# Patient Record
Sex: MALE | Race: BLACK OR AFRICAN AMERICAN | NOT HISPANIC OR LATINO | Employment: FULL TIME | URBAN - METROPOLITAN AREA
[De-identification: names, ages, dates, MRNs, and addresses within clinical notes are randomized per-mention and may not be internally consistent; named-entity substitution may affect disease eponyms.]

---

## 2017-01-05 ENCOUNTER — TELEPHONE (OUTPATIENT)
Dept: INTERNAL MEDICINE | Facility: CLINIC | Age: 57
End: 2017-01-05

## 2017-01-05 NOTE — TELEPHONE ENCOUNTER
----- Message from Kellie Pacheco sent at 1/4/2017  2:15 PM CST -----  Call pt at [phone]//concerning a report of MRI need you to call me concerning it//samina titus

## 2017-01-26 ENCOUNTER — OFFICE VISIT (OUTPATIENT)
Dept: SLEEP MEDICINE | Facility: CLINIC | Age: 57
End: 2017-01-26
Payer: COMMERCIAL

## 2017-01-26 VITALS
DIASTOLIC BLOOD PRESSURE: 58 MMHG | HEART RATE: 72 BPM | BODY MASS INDEX: 32.97 KG/M2 | OXYGEN SATURATION: 99 % | HEIGHT: 72 IN | WEIGHT: 243.38 LBS | SYSTOLIC BLOOD PRESSURE: 124 MMHG | RESPIRATION RATE: 18 BRPM

## 2017-01-26 DIAGNOSIS — G47.33 OSA ON CPAP: Primary | ICD-10-CM

## 2017-01-26 DIAGNOSIS — R06.3 CENTRAL SLEEP APNEA DUE TO CHEYNE-STOKES RESPIRATION: ICD-10-CM

## 2017-01-26 DIAGNOSIS — G47.26 SHIFT WORK SLEEP DISORDER: Chronic | ICD-10-CM

## 2017-01-26 DIAGNOSIS — Z72.821 POOR SLEEP HYGIENE: Chronic | ICD-10-CM

## 2017-01-26 PROCEDURE — 3078F DIAST BP <80 MM HG: CPT | Mod: S$GLB,,, | Performed by: INTERNAL MEDICINE

## 2017-01-26 PROCEDURE — 1159F MED LIST DOCD IN RCRD: CPT | Mod: S$GLB,,, | Performed by: INTERNAL MEDICINE

## 2017-01-26 PROCEDURE — 3074F SYST BP LT 130 MM HG: CPT | Mod: S$GLB,,, | Performed by: INTERNAL MEDICINE

## 2017-01-26 PROCEDURE — 99213 OFFICE O/P EST LOW 20 MIN: CPT | Mod: S$GLB,,, | Performed by: INTERNAL MEDICINE

## 2017-01-26 PROCEDURE — 99999 PR PBB SHADOW E&M-EST. PATIENT-LVL IV: CPT | Mod: PBBFAC,,, | Performed by: INTERNAL MEDICINE

## 2017-01-26 NOTE — PATIENT INSTRUCTIONS
Sleep Hygiene: The healthy habits of Good sleep    [email or web address].[employer]    1. Don't go to bed unless you are sleepy: If you are not sleepy at bedtime , then do something else. Read a book, listen to soft music or browse through a magazine. Find something relaxing , but not stimulating to take your mind off worries about sleep, this will relax your body and distract your mind.    2. If you are not asleep after 20 minutes, then get out of bed.  Find something else to do that will make you feel relaxed. If you can, do this in another room. Your bedroom should be where you go to sleep. It is not a place to go when you are bored. Once you feel sleepy again, go back to bed.    3. Begin rituals than help you relax each night before bed.  This can include such things like a warm bath, light snack or a few minutes of reading.    4. Get up at the same time every morning.  Do this even on weekends and holidays.    5. Get a full night's sleep on a regular basis.  Get enough sleep so that you feel well rested nearly every day.    6. Avoid taking naps if you can.  If you must take a nap, try to keep it short (less than one hour). Never take a nap after 3 pm.    7. Keep a regular schedule.  Regular times for meals, medications, chores and other activities help keep the inner body clock running smoothly.    8. Don't read, write, eat, watch TV, talk on the phone, play with electronics or play cards in bed.    9. Do not have any caffeine after lunch.    10. Do not have a beer, a glass of wine, or any other alcohol within six hours of your bedtime.    11. Do not have a cigarette or any other source of nicotine before bedtime.    12. Do not go to bed hungry, but don't eat a big meal near bedtime either.    13. Avoid any tough exercise within six hours of your bedtime.  You should exercise on a regular basis, but do it earlier in the day,(talk to your doctor before you begin an exercise program)    14.  Avoid sleeping pills, or use  them cautiously.  Most doctors do not prescribe sleeping pills for periods of more than hree weeks. Do not drink alcohol while taking sleeping pills.    15. Try to get rid of or deal with things that make you worry.  If you are unable to do this, then find a time during the day to get all your worries out of your system. Your bed is a place to rest, not a place to worry.    16. Make you bedroom quiet, dark and a little bit cool.  An easy way to remember this; it should remind you of a cave, while this may not sound romantic, it seems to work for bats. Bats are champion sleepers. They get about 16 hours of sleep each day. Maybe it's because they sleep in dark , cool caves.       Thank you for using [facility] and your visit to our facility.  To rate you experience with Dr Raymundo Hines please click on link below    [email or web address]    Rate your Physician

## 2017-01-26 NOTE — PROGRESS NOTES
Subjective:       Christiano Cox is a 57 y.o. male    Severe SOFIYA based on PSG  This a follow-up appointment  I last saw him October 2016.  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  Has not been using device  On the night shift he tries to get to get some sleep during the day but most often he takes short naps  We discussed with patient intense her more was sleep hygiene and chronic sleep exacerbation.  I like him to keep a sleep diary and wear an acti watch    Works [employer] sec[employer] in [employer]  [place] score 1  His titration study showed an AHI of 47.9.  His echo ejection fraction is 45% with some mild diastolic dysfunction  We dropped his CPAP pressure to 6 cm of water pressure because on a BiPAP settings he had lots of central apneas  His sleep routine on a regular night is in bed at 10:30 PM wakeup time 5 AM.          Previous Report(s) Reviewed: historical medical records and office notes     The following portions of the patient's history were reviewed and updated as appropriate:   He  has a past medical history of Acute coronary syndrome; Coronary atherosclerosis of unspecified type of vessel, native or graft; DM (diabetes mellitus), type 2 with complications; History of gout; Hyperlipidemia associated with type 2 diabetes mellitus; Hypertension associated with diabetes; Obesity, unspecified; SOFIYA on CPAP; S/P CABG x 3; and Type II diabetes mellitus with renal manifestations.  He  does not have any pertinent problems on file.  He  has a past surgical history that includes Coronary artery bypass graft; Cardiac catheterization; and Coronary angioplasty.  His family history includes Diabetes in his brother, mother, and sister; Heart disease in his brother; Hypertension in his brother and father; Stroke in his sister.  He  reports that he has never smoked. He has never used smokeless tobacco. He reports that he drinks alcohol. He reports that he does not use illicit drugs.  He has a  "current medication list which includes the following prescription(s): accu-chek multiclix lancet, amlodipine, aspirin, atorvastatin, bd insulin pen needle uf short, dulaglutide, fenofibrate micronized, furosemide, insulin glargine, lancing device, metoprolol tartrate, nitroglycerin, ranolazine, ticagrelor, and valsartan-hydrochlorothiazide.  Current Outpatient Prescriptions on File Prior to Visit   Medication Sig Dispense Refill    ACCU-CHEK MULTICLIX LANCET lancets TEST DAILY AS DIRECTED 102 each 11    amlodipine (NORVASC) 10 MG tablet TAKE ONE TABLET BY MOUTH EVERY DAY 90 tablet 3    aspirin 81 MG Chew Take 81 mg by mouth once daily.      atorvastatin (LIPITOR) 80 MG tablet Take 1 tablet (80 mg total) by mouth once daily. 60 tablet 1    BD INSULIN PEN NEEDLE UF SHORT 31 gauge x 5/16" Ndle AS DIRECTED 100 each 3    dulaglutide 1.5 mg/0.5 mL PnIj Inject 1.5 mg into the skin every 7 days. 0.5 mL 11    fenofibrate micronized (LOFIBRA) 67 MG capsule Take 1 capsule (67 mg total) by mouth daily with breakfast. 30 capsule 10    furosemide (LASIX) 20 MG tablet Take 1 tablet (20 mg total) by mouth once daily. 30 tablet 11    insulin glargine (LANTUS SOLOSTAR) 100 unit/mL (3 mL) InPn pen Inject 40 Units into the skin every evening. 3 Box 3    lancing device (MULTI-LANCET DEVICE) Misc 1 lancet by In Vitro route once daily. 102 each 3    metoprolol tartrate (LOPRESSOR) 100 MG tablet Take 1 tablet (100 mg total) by mouth 2 (two) times daily. 180 tablet 3    nitroGLYCERIN (NITROSTAT) 0.4 MG SL tablet Place 1 tablet (0.4 mg total) under the tongue every 5 (five) minutes as needed for Chest pain. 30 tablet 6    ranolazine (RANEXA) 500 MG Tb12 Take 1 tablet (500 mg total) by mouth 2 (two) times daily. 180 tablet 3    ticagrelor (BRILINTA) 90 mg tablet Take 1 tablet (90 mg total) by mouth 2 (two) times daily. 180 tablet 3    valsartan-hydrochlorothiazide (DIOVAN-HCT) 320-25 mg per tablet Take 1 tablet by mouth once " daily. 30 tablet 11     No current facility-administered medications on file prior to visit.      He is allergic to no known drug allergies..    Review of Systems  A comprehensive review of systems was negative.      Objective:        Visit Vitals    BP (!) 124/58    Pulse 72    Resp 18    Ht 6' (1.829 m)    Wt 110.4 kg (243 lb 6.2 oz)    SpO2 99%    BMI 33.01 kg/m2     General appearance: alert, appears stated age, cooperative and no distress  Lungs: clear to auscultation bilaterally and normal percussion bilaterally  Extremities: extremities normal, atraumatic, no cyanosis or edema  Pulses: 2+ and symmetric  Skin: Skin color, texture, turgor normal. No rashes or lesions        Echo:  1 - Mild left atrial enlargement.     2 - Mildly depressed left ventricular systolic function (EF 45-50%).     3 - Concentric hypertrophy.     4 - Left ventricular diastolic dysfunction.     5 - Normal right ventricular systolic function .     6 - Trivial to mild mitral regurgitation    Download:  12/27/2016 - 1/25/2017  YOB: 1960  Mask:  Compliance Summary  12/27/2016 - 1/25/2017 (30 days)  Days with Device Usage 5 days  Days without Device Usage 25 days  Percent Days with Device Usage 16.7%  Cumulative Usage 17 hrs. 6 mins. 44 secs.  Maximum Usage (1 Day) 6 hrs. 31 mins. 19 secs.  Average Usage (All Days) 34 mins. 13 secs.  Average Usage (Days Used) 3 hrs. 25 mins. 20 secs.  Minimum Usage (1 Day) 15 secs.  Percent of Days with Usage >= 4 Hours 10.0%  Percent of Days with Usage < 4 Hours 90.0%  Date Range  Total Blower Time 17 hrs. 10 mins. 44 secs.  Sleep Therapy Statistics ([employer])  Average Time in Large Leak Per Day 3 mins. 36 secs.  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     TIME SPENT WITH PATIENT:     Time spent: 20 minutes in face to face  discussion concerning diagnosis, prognosis, review of lab and test results, benefits of treatment as well as management of disease, counseling of patient and coordination of care between various health  care providers . Greater than half the time spent was used for coordination of care and counseling of patient.     Raymundo Hines    Pulmonary/Critical care/Sleepmedicine      This note was prepared using voice recognition system and is likely to have sound alike errors that may have been overlooked even after proof reading.  Please call me with any questions    Discussed diagnosis, its evaluation, treatment and usual course. All questions answered.    Raymundo Hines MD

## 2017-01-26 NOTE — MR AVS SNAPSHOT
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      [facility]     [facility] Care [facility] -  Assistance  Registered nurses in the [facility] Call Center provide clinical advisement, health education, appointment booking, and other advisory services.  Call for this free service at [phone].             Medications           Message regarding Medications     Verify the changes and/or additions to your medication regime listed below are the same as discussed with your clinician today.  If any of these changes or additions are incorrect, please notify your healthcare provider.             Verify that the below list of medications is an accurate representation of the medications you are currently taking.  If none reported, the list may be blank. If incorrect, please contact your healthcare provider. Carry this list with you in case of emergency.           Current Medications     ACCU-CHEK MULTICLIX LANCET lancets TEST DAILY AS DIRECTED    amlodipine (NORVASC) 10 MG tablet TAKE ONE TABLET BY MOUTH EVERY DAY    aspirin 81 MG Chew Take 81 mg by mouth once daily.    atorvastatin  "(LIPITOR) 80 MG tablet Take 1 tablet (80 mg total) by mouth once daily.    BD INSULIN PEN NEEDLE UF SHORT 31 gauge x 5/16" Ndle AS DIRECTED    dulaglutide 1.5 mg/0.5 mL PnIj Inject 1.5 mg into the skin every 7 days.    fenofibrate micronized (LOFIBRA) 67 MG capsule Take 1 capsule (67 mg total) by mouth daily with breakfast.    furosemide (LASIX) 20 MG tablet Take 1 tablet (20 mg total) by mouth once daily.    insulin glargine (LANTUS SOLOSTAR) 100 unit/mL (3 mL) InPn pen Inject 40 Units into the skin every evening.    lancing device (MULTI-LANCET DEVICE) Misc 1 lancet by In Vitro route once daily.    metoprolol tartrate (LOPRESSOR) 100 MG tablet Take 1 tablet (100 mg total) by mouth 2 (two) times daily.    nitroGLYCERIN (NITROSTAT) 0.4 MG SL tablet Place 1 tablet (0.4 mg total) under the tongue every 5 (five) minutes as needed for Chest pain.    ranolazine (RANEXA) 500 MG Tb12 Take 1 tablet (500 mg total) by mouth 2 (two) times daily.    ticagrelor (BRILINTA) 90 mg tablet Take 1 tablet (90 mg total) by mouth 2 (two) times daily.    valsartan-hydrochlorothiazide (DIOVAN-HCT) 320-25 mg per tablet Take 1 tablet by mouth once daily.           Clinical Reference Information           Vital Signs - Last Recorded  Most recent update: 1/26/2017 10:13 AM by Woody Quinones LPN    BP Pulse Resp Ht Wt SpO2    (!) 124/58 72 18 6' (1.829 m) 110.4 kg (243 lb 6.2 oz) 99%    BMI                33.01 kg/m2          Blood Pressure          Most Recent Value    BP  (!)  124/58      Allergies as of 1/26/2017     No Known Drug Allergies      Immunizations Administered on Date of Encounter - 1/26/2017     None      Orders Placed During Today's Visit      Normal Orders This Visit    Actigraphy       Instructions    Sleep Hygiene: The healthy habits of Good sleep    [email or web address]    1. Don't go to bed unless you are sleepy: If you are not sleepy at bedtime , then do something else. Read a book, listen to soft music or browse " through a magazine. Find something relaxing , but not stimulating to take your mind off worries about sleep, this will relax your body and distract your mind.    2. If you are not asleep after 20 minutes, then get out of bed.  Find something else to do that will make you feel relaxed. If you can, do this in another room. Your bedroom should be where you go to sleep. It is not a place to go when you are bored. Once you feel sleepy again, go back to bed.    3. Begin rituals than help you relax each night before bed.  This can include such things like a warm bath, light snack or a few minutes of reading.    4. Get up at the same time every morning.  Do this even on weekends and holidays.    5. Get a full night's sleep on a regular basis.  Get enough sleep so that you feel well rested nearly every day.    6. Avoid taking naps if you can.  If you must take a nap, try to keep it short (less than one hour). Never take a nap after 3 pm.    7. Keep a regular schedule.  Regular times for meals, medications, chores and other activities help keep the inner body clock running smoothly.    8. Don't read, write, eat, watch TV, talk on the phone, play with electronics or play cards in bed.    9. Do not have any caffeine after lunch.    10. Do not have a beer, a glass of wine, or any other alcohol within six hours of your bedtime.    11. Do not have a cigarette or any other source of nicotine before bedtime.    12. Do not go to bed hungry, but don't eat a big meal near bedtime either.    13. Avoid any tough exercise within six hours of your bedtime.  You should exercise on a regular basis, but do it earlier in the day,(talk to your doctor before you begin an exercise program)    14.  Avoid sleeping pills, or use them cautiously.  Most doctors do not prescribe sleeping pills for periods of more than hree weeks. Do not drink alcohol while taking sleeping pills.    15. Try to get rid of or deal with things that make you worry.  If you  are unable to do this, then find a time during the day to get all your worries out of your system. Your bed is a place to rest, not a place to worry.    16. Make you bedroom quiet, dark and a little bit cool.  An easy way to remember this; it should remind you of a cave, while this may not sound romantic, it seems to work for bats. Bats are champion sleepers. They get about 16 hours of sleep each day. Maybe it's because they sleep in dark , cool caves.       Thank you for using [facility] and your visit to our facility.  To rate you experience with Dr Raymundo Hines please click on link below    [email or web address]    Rate your Physician

## 2017-01-30 RX ORDER — PEN NEEDLE, DIABETIC 30 GX3/16"
NEEDLE, DISPOSABLE MISCELLANEOUS
Qty: 100 EACH | Refills: 3 | Status: SHIPPED | OUTPATIENT
Start: 2017-01-30 | End: 2018-01-22 | Stop reason: SDUPTHER

## 2017-02-08 RX ORDER — METOPROLOL TARTRATE 100 MG/1
TABLET ORAL
Qty: 180 TABLET | OUTPATIENT
Start: 2017-02-08

## 2017-02-13 ENCOUNTER — TELEPHONE (OUTPATIENT)
Dept: INTERNAL MEDICINE | Facility: CLINIC | Age: 57
End: 2017-02-13

## 2017-02-13 NOTE — TELEPHONE ENCOUNTER
----- Message from Jessy Zavala sent at 2/13/2017  8:26 AM CST -----  Contact: pt   Call pt regarding some paperwork that need to be filled out.    ...[phone]

## 2017-02-14 ENCOUNTER — APPOINTMENT (OUTPATIENT)
Dept: CARDIOLOGY | Facility: CLINIC | Age: 57
End: 2017-02-14
Payer: COMMERCIAL

## 2017-02-14 ENCOUNTER — HOSPITAL ENCOUNTER (OUTPATIENT)
Dept: RADIOLOGY | Facility: HOSPITAL | Age: 57
Discharge: HOME | End: 2017-02-14
Attending: INTERNAL MEDICINE
Payer: COMMERCIAL

## 2017-02-14 ENCOUNTER — OFFICE VISIT (OUTPATIENT)
Dept: INTERNAL MEDICINE | Facility: CLINIC | Age: 57
End: 2017-02-14
Payer: COMMERCIAL

## 2017-02-14 ENCOUNTER — APPOINTMENT (OUTPATIENT)
Dept: LAB | Facility: HOSPITAL | Age: 57
End: 2017-02-14
Attending: INTERNAL MEDICINE
Payer: COMMERCIAL

## 2017-02-14 ENCOUNTER — DOCUMENTATION ONLY (OUTPATIENT)
Dept: CARDIOLOGY | Facility: HOSPITAL | Age: 57
End: 2017-02-14

## 2017-02-14 VITALS
DIASTOLIC BLOOD PRESSURE: 76 MMHG | SYSTOLIC BLOOD PRESSURE: 122 MMHG | BODY MASS INDEX: 33.41 KG/M2 | HEART RATE: 74 BPM | TEMPERATURE: 96 F | HEIGHT: 72 IN | OXYGEN SATURATION: 98 % | WEIGHT: 246.69 LBS

## 2017-02-14 DIAGNOSIS — Z01.818 PREOP EXAMINATION: Primary | ICD-10-CM

## 2017-02-14 DIAGNOSIS — Z79.4 TYPE 2 DIABETES MELLITUS WITH DIABETIC NEPHROPATHY, WITH LONG-TERM CURRENT USE OF INSULIN: ICD-10-CM

## 2017-02-14 DIAGNOSIS — Z01.818 PREOP EXAMINATION: ICD-10-CM

## 2017-02-14 DIAGNOSIS — Z12.5 SCREENING FOR PROSTATE CANCER: ICD-10-CM

## 2017-02-14 DIAGNOSIS — E11.21 TYPE 2 DIABETES MELLITUS WITH DIABETIC NEPHROPATHY, WITH LONG-TERM CURRENT USE OF INSULIN: ICD-10-CM

## 2017-02-14 LAB
BILIRUB UR QL STRIP: NEGATIVE
CLARITY UR REFRACT.AUTO: CLEAR
COLOR UR AUTO: NORMAL
GLUCOSE UR QL STRIP: NEGATIVE
HGB UR QL STRIP: NEGATIVE
KETONES UR QL STRIP: NEGATIVE
LEUKOCYTE ESTERASE UR QL STRIP: NEGATIVE
NITRITE UR QL STRIP: NEGATIVE
PH UR STRIP: 6 [PH] (ref 5–8)
PROT UR QL STRIP: NEGATIVE
SP GR UR STRIP: 1.01 (ref 1–1.03)
URN SPEC COLLECT METH UR: NORMAL
UROBILINOGEN UR STRIP-ACNC: NEGATIVE EU/DL

## 2017-02-14 PROCEDURE — 93010 ELECTROCARDIOGRAM REPORT: CPT | Mod: S$GLB,,, | Performed by: INTERNAL MEDICINE

## 2017-02-14 PROCEDURE — 71020 XR CHEST PA AND LATERAL: CPT | Mod: 26,,, | Performed by: RADIOLOGY

## 2017-02-14 PROCEDURE — 93005 ELECTROCARDIOGRAM TRACING: CPT | Mod: S$GLB,,, | Performed by: INTERNAL MEDICINE

## 2017-02-14 PROCEDURE — 99214 OFFICE O/P EST MOD 30 MIN: CPT | Mod: S$GLB,,, | Performed by: INTERNAL MEDICINE

## 2017-02-14 PROCEDURE — 71020 XR CHEST PA AND LATERAL: CPT | Mod: TC,PO

## 2017-02-14 PROCEDURE — 2022F DILAT RTA XM EVC RTNOPTHY: CPT | Mod: S$GLB,,, | Performed by: INTERNAL MEDICINE

## 2017-02-14 PROCEDURE — 3066F NEPHROPATHY DOC TX: CPT | Mod: S$GLB,,, | Performed by: INTERNAL MEDICINE

## 2017-02-14 PROCEDURE — 3078F DIAST BP <80 MM HG: CPT | Mod: S$GLB,,, | Performed by: INTERNAL MEDICINE

## 2017-02-14 PROCEDURE — 3044F HG A1C LEVEL LT 7.0%: CPT | Mod: S$GLB,,, | Performed by: INTERNAL MEDICINE

## 2017-02-14 PROCEDURE — 3074F SYST BP LT 130 MM HG: CPT | Mod: S$GLB,,, | Performed by: INTERNAL MEDICINE

## 2017-02-14 PROCEDURE — 99999 PR PBB SHADOW E&M-EST. PATIENT-LVL III: CPT | Mod: PBBFAC,,, | Performed by: INTERNAL MEDICINE

## 2017-02-14 PROCEDURE — 81003 URINALYSIS AUTO W/O SCOPE: CPT

## 2017-02-14 NOTE — PROGRESS NOTES
Spoke with patient's daughter regarding patient seeking clearance for back surgery. Patient is post NSTEMI in May 2016. Underwent angiogram with successful PCI to SVG to OM1 with GRAHAM. He has been advised to remain on dual antiplatelet therapy (ASA and Brilinta) for 1 year post PCI. I am unable to clear patient at this time for surgery.

## 2017-02-14 NOTE — PROGRESS NOTES
HPI:  Patient is a 57-year-old man who comes in today for preoperative clearance to have L4-L5 laminectomy done.  He has typical sciatic pain.  Patient otherwise at this time is doing fine.  He denies any chest pains or shortness of breath.  He is status post CABG.  He also had one stent put in last May.  He is on Brilinta and aspirin.  He states his surgeon told him he had to stop the aspirin but could stay on Brilinta.  Patient's diabetes is been doing well.  He's had no hypoglycemic events.  His blood pressures been well-controlled.  He denies any other complaints or problems      Current MEDS: medcard review, verified and update  Allergies: Per the electronic medical record    Past Medical History   Diagnosis Date    Acute coronary syndrome     Coronary atherosclerosis of unspecified type of vessel, native or graft      s/p cabg and stents    DM (diabetes mellitus), type 2 with complications     History of gout     Hyperlipidemia associated with type 2 diabetes mellitus     Hypertension associated with diabetes     Obesity, unspecified     SOFIYA on CPAP     S/P CABG x 3     Type II diabetes mellitus with renal manifestations        Past Surgical History   Procedure Laterality Date    Coronary artery bypass graft      Cardiac catheterization      Coronary angioplasty         SHx: per the electronic medical record    FHx: recorded in the electronic medical record    ROS:    denies any chest pains or shortness of breath. Denies any nausea, vomiting or diarrhea. Denies any fever, chills or sweats. Denies any change in weight, voice, stool, skin or hair. Denies any dysuria, dyspepsia or dysphagia. Denies any change in vision, hearing or headaches. Denies any swollen lymph nodes or loss of memory.    PE:  Visit Vitals    /76    Pulse 74    Temp 96.1 °F (35.6 °C) (Tympanic)    Ht 6' (1.829 m)    Wt 111.9 kg (246 lb 11.1 oz)    SpO2 98%    BMI 33.46 kg/m2     Gen: Well-developed, well-nourished,  male, in no acute distress, oriented x3  HEENT: neck is supple, no adenopathy, carotids 2+ equal without bruits, thyroid exam normal size without nodules.  CHEST: clear to auscultation and percussion  CVS: regular rate and rhythm without significant murmur, gallop, or rubs  ABD: soft, benign, no rebound no guarding, no distention.  Bowel sounds are normal.     nontender.  No palpable masses.  No organomegaly and no audible bruits.  RECTAL: Deferred.  EXT: no clubbing, cyanosis, or edema  LYMPH: no cervical, inguinal, or axillary adenopathy  FEET: no loss of sensation.  No ulcers or pressure sores.  Monofilament testing normal  NEURO: gait normal.  Cranial nerves II- XII intact. No nystagmus.  Speech normal.   Gross motor and sensory unremarkable.  Chest x-ray was normal  EKG shows previous documented changes, but it is unchanged from prior EKGs  H&H 12.8 and 36  CMP with creatinine 2.0, unchanged, otherwise unremarkable      Impression:  Lumbar back pain with sciatica  Coronary artery disease, status post CABG, status post stent May 2016.  He currently is on dual antiplatelet therapy.  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  He is at higher risk due to his other comorbid conditions.  I'll leave it up to decision the surgeon whether he should stay on antiplatelet therapy with Brilinta.  His last stent was 10 months ago, so I know the cardiologist would want it continued.  He should take half of his Lantus the evening before.  All other medications may be continued except the aspirin should be stopped

## 2017-02-16 ENCOUNTER — TELEPHONE (OUTPATIENT)
Dept: PULMONOLOGY | Facility: CLINIC | Age: 57
End: 2017-02-16

## 2017-03-01 ENCOUNTER — OFFICE VISIT (OUTPATIENT)
Dept: CARDIOLOGY | Facility: CLINIC | Age: 57
End: 2017-03-01
Payer: COMMERCIAL

## 2017-03-01 VITALS
BODY MASS INDEX: 33.59 KG/M2 | HEART RATE: 64 BPM | HEIGHT: 72 IN | WEIGHT: 248 LBS | DIASTOLIC BLOOD PRESSURE: 64 MMHG | SYSTOLIC BLOOD PRESSURE: 123 MMHG

## 2017-03-01 DIAGNOSIS — I25.10 CORONARY ARTERY DISEASE INVOLVING NATIVE CORONARY ARTERY OF NATIVE HEART WITHOUT ANGINA PECTORIS: Primary | ICD-10-CM

## 2017-03-01 DIAGNOSIS — E78.5 HYPERLIPIDEMIA ASSOCIATED WITH TYPE 2 DIABETES MELLITUS: ICD-10-CM

## 2017-03-01 DIAGNOSIS — E11.59 HYPERTENSION ASSOCIATED WITH DIABETES: ICD-10-CM

## 2017-03-01 DIAGNOSIS — I25.10 ATHEROSCLEROSIS OF NATIVE CORONARY ARTERY OF NATIVE HEART WITHOUT ANGINA PECTORIS: ICD-10-CM

## 2017-03-01 DIAGNOSIS — G47.33 OSA ON CPAP: ICD-10-CM

## 2017-03-01 DIAGNOSIS — R06.3 CENTRAL SLEEP APNEA DUE TO CHEYNE-STOKES RESPIRATION: ICD-10-CM

## 2017-03-01 DIAGNOSIS — E11.69 HYPERLIPIDEMIA ASSOCIATED WITH TYPE 2 DIABETES MELLITUS: ICD-10-CM

## 2017-03-01 DIAGNOSIS — I15.2 HYPERTENSION ASSOCIATED WITH DIABETES: ICD-10-CM

## 2017-03-01 PROCEDURE — 3078F DIAST BP <80 MM HG: CPT | Mod: S$GLB,,, | Performed by: NURSE PRACTITIONER

## 2017-03-01 PROCEDURE — 99214 OFFICE O/P EST MOD 30 MIN: CPT | Mod: S$GLB,,, | Performed by: NURSE PRACTITIONER

## 2017-03-01 PROCEDURE — 3066F NEPHROPATHY DOC TX: CPT | Mod: S$GLB,,, | Performed by: NURSE PRACTITIONER

## 2017-03-01 PROCEDURE — 3044F HG A1C LEVEL LT 7.0%: CPT | Mod: S$GLB,,, | Performed by: NURSE PRACTITIONER

## 2017-03-01 PROCEDURE — 99999 PR PBB SHADOW E&M-EST. PATIENT-LVL II: CPT | Mod: PBBFAC,,, | Performed by: NURSE PRACTITIONER

## 2017-03-01 PROCEDURE — 2022F DILAT RTA XM EVC RTNOPTHY: CPT | Mod: S$GLB,,, | Performed by: NURSE PRACTITIONER

## 2017-03-01 PROCEDURE — 3074F SYST BP LT 130 MM HG: CPT | Mod: S$GLB,,, | Performed by: NURSE PRACTITIONER

## 2017-03-01 RX ORDER — ISOSORBIDE MONONITRATE 30 MG/1
30 TABLET, EXTENDED RELEASE ORAL DAILY
Qty: 30 TABLET | Refills: 6 | Status: SHIPPED | OUTPATIENT
Start: 2017-03-01 | End: 2017-11-20 | Stop reason: SDUPTHER

## 2017-03-01 RX ORDER — GABAPENTIN 300 MG/1
CAPSULE ORAL
Refills: 3 | COMMUNITY
Start: 2017-02-14 | End: 2017-11-27

## 2017-03-01 NOTE — MR AVS SNAPSHOT
[facility]  [id number] [place] [id number]  Phone: [phone]  Fax: [phone]                  Christiano Cox   3/1/2017 8:30 AM   Office Visit    Description:  Male : 1960   Provider:  АНДРЕЙ Jose   Department:  [facility]           Reason for Visit     Coronary Artery Disease           Diagnoses this Visit        Comments    Coronary artery disease involving native coronary artery of native heart without angina pectoris    -  Primary     SOFIYA on CPAP         Central sleep apnea due to Cheyne-Pérez respiration         Hypertension associated with diabetes         Atherosclerosis of native coronary artery of native heart without angina pectoris         Hyperlipidemia associated with type 2 diabetes mellitus                To Do List           Goals (5 Years of Data)     None      Follow-Up and Disposition     Return in about 6 months (around 2017) for CAD with fasting labs with PCP .       These Medications        Disp Refills Start End    isosorbide mononitrate (IMDUR) 30 MG 24 hr tablet 30 tablet 6 3/1/2017     Take 1 tablet (30 mg total) by mouth once daily. - Oral    Pharmacy: [facility] [address] #: [phone]         [facility] On Call     [facility] On Call Nurse Care Line -  Assistance  Registered nurses in the [facility] provide clinical advisement, health education, appointment booking, and other advisory services.  Call for this free service at [phone].             Medications           Message regarding Medications     Verify the changes and/or additions to your medication regime listed below are the same as discussed with your clinician today.  If any of these changes or additions are incorrect, please notify your healthcare provider.        START taking these NEW medications        Refills    isosorbide mononitrate (IMDUR) 30 MG 24 hr tablet 6    Sig: Take  "1 tablet (30 mg total) by mouth once daily.    Class: Normal    Route: Oral           Verify that the below list of medications is an accurate representation of the medications you are currently taking.  If none reported, the list may be blank. If incorrect, please contact your healthcare provider. Carry this list with you in case of emergency.           Current Medications     ACCU-CHEK MULTICLIX LANCET lancets TEST DAILY AS DIRECTED    amlodipine (NORVASC) 10 MG tablet TAKE ONE TABLET BY MOUTH EVERY DAY    aspirin 81 MG Chew Take 81 mg by mouth once daily.    atorvastatin (LIPITOR) 80 MG tablet Take 1 tablet (80 mg total) by mouth once daily.    dulaglutide 1.5 mg/0.5 mL PnIj Inject 1.5 mg into the skin every 7 days.    fenofibrate micronized (LOFIBRA) 67 MG capsule Take 1 capsule (67 mg total) by mouth daily with breakfast.    furosemide (LASIX) 20 MG tablet Take 1 tablet (20 mg total) by mouth once daily.    gabapentin (NEURONTIN) 300 MG capsule ONE BY MOUTH EVERY TWELVE HOURS    insulin glargine (LANTUS SOLOSTAR) 100 unit/mL (3 mL) InPn pen Inject 40 Units into the skin every evening.    lancing device (MULTI-LANCET DEVICE) Misc 1 lancet by In Vitro route once daily.    metoprolol tartrate (LOPRESSOR) 100 MG tablet Take 1 tablet (100 mg total) by mouth 2 (two) times daily.    nitroGLYCERIN (NITROSTAT) 0.4 MG SL tablet Place 1 tablet (0.4 mg total) under the tongue every 5 (five) minutes as needed for Chest pain.    pen needle, diabetic (BD INSULIN PEN NEEDLE UF SHORT) 31 gauge x 5/16" Ndle AS DIRECTED    ranolazine (RANEXA) 500 MG Tb12 Take 1 tablet (500 mg total) by mouth 2 (two) times daily.    ticagrelor (BRILINTA) 90 mg tablet Take 1 tablet (90 mg total) by mouth 2 (two) times daily.    valsartan-hydrochlorothiazide (DIOVAN-HCT) 320-25 mg per tablet Take 1 tablet by mouth once daily.    isosorbide mononitrate (IMDUR) 30 MG 24 hr tablet Take 1 tablet (30 mg total) by mouth once daily.           Clinical " Reference Information           Your Vitals Were     BP                   123/64 (BP Location: Right arm, Patient Position: Sitting, BP Method: Manual)           Blood Pressure          Most Recent Value    BP  123/64      Allergies as of 3/1/2017     No Known Drug Allergies      Immunizations Administered on Date of Encounter - 3/1/2017     None      Language Assistance Services     ATTENTION: Language assistance services are available, free of charge. Please call [phone].      ATENCIÓN: Si habla bhaskarañol, tiene a ge disposición servicios gratuitos de asistencia lingüística. Llame al [phone].     CHÚ Ý: N?u b?n nói Ti?ng Vi?t, có các d?ch v? h? tr? ngôn ng? mi?n phí dành cho b?n. G?i s? [phone].         [facility] complies with applicable Federal civil rights laws and does not discriminate on the basis of race, color, national origin, age, disability, or sex.

## 2017-03-01 NOTE — PROGRESS NOTES
Subjective:   Patient ID:  Christiano Cox is a 57 y.o. male who presents for follow up of Coronary Artery Disease      HPI    Patient presents to clinic today for follow up and management of CAD. Patient underwent angiogram on 5/16/16 for NSTEMI that showed  occluded rca, lad and lcx, occluded svg to pda, severe proximal stenosis of the svg to om treated with resolute stent and filter wire protection. Also noted to have patent lima to lad and Normal filling pressure. Successful PCI with GRAHAM to SVG to OM1. He reports that has to take SL nitro like 3 times a week. Has occasional discomfort, but is relieved with 1 nitro. Has low back pain from pinched nerve in back. BP stable. No GOODMAN, edema, dizziness, near syncope, palpitations. No symptoms to suggest CHF. Wears CPAP about 90% of the time. Renal function stable. A1C much better at 6.6. Lipids look ok. No abnormal bleeding on dual antiplatelets. Patient planning to do back surgery after he has completed his year of dual antiplatelets     Past Medical History:   Diagnosis Date    Acute coronary syndrome     Coronary atherosclerosis of unspecified type of vessel, native or graft     s/p cabg and stents    DM (diabetes mellitus), type 2 with complications     History of gout     Hyperlipidemia associated with type 2 diabetes mellitus     Hypertension associated with diabetes     Obesity, unspecified     SOFIYA on CPAP     S/P CABG x 3     Type II diabetes mellitus with renal manifestations        Past Surgical History:   Procedure Laterality Date    CARDIAC CATHETERIZATION      CORONARY ANGIOPLASTY      CORONARY ARTERY BYPASS GRAFT         Social History   Substance Use Topics    Smoking status: Never Smoker    Smokeless tobacco: Never Used    Alcohol use Yes       Family History   Problem Relation Age of Onset    Diabetes Mother     Hypertension Father     Stroke Sister     Diabetes Sister     Heart disease Brother     Diabetes Brother      "Hypertension Brother        Current Outpatient Prescriptions   Medication Sig    ACCU-CHEK MULTICLIX LANCET lancets TEST DAILY AS DIRECTED    amlodipine (NORVASC) 10 MG tablet TAKE ONE TABLET BY MOUTH EVERY DAY    aspirin 81 MG Chew Take 81 mg by mouth once daily.    atorvastatin (LIPITOR) 80 MG tablet Take 1 tablet (80 mg total) by mouth once daily.    dulaglutide 1.5 mg/0.5 mL PnIj Inject 1.5 mg into the skin every 7 days.    fenofibrate micronized (LOFIBRA) 67 MG capsule Take 1 capsule (67 mg total) by mouth daily with breakfast.    furosemide (LASIX) 20 MG tablet Take 1 tablet (20 mg total) by mouth once daily.    insulin glargine (LANTUS SOLOSTAR) 100 unit/mL (3 mL) InPn pen Inject 40 Units into the skin every evening.    lancing device (MULTI-LANCET DEVICE) Misc 1 lancet by In Vitro route once daily.    metoprolol tartrate (LOPRESSOR) 100 MG tablet Take 1 tablet (100 mg total) by mouth 2 (two) times daily.    nitroGLYCERIN (NITROSTAT) 0.4 MG SL tablet Place 1 tablet (0.4 mg total) under the tongue every 5 (five) minutes as needed for Chest pain.    pen needle, diabetic (BD INSULIN PEN NEEDLE UF SHORT) 31 gauge x 5/16" Ndle AS DIRECTED    ranolazine (RANEXA) 500 MG Tb12 Take 1 tablet (500 mg total) by mouth 2 (two) times daily.    ticagrelor (BRILINTA) 90 mg tablet Take 1 tablet (90 mg total) by mouth 2 (two) times daily.    valsartan-hydrochlorothiazide (DIOVAN-HCT) 320-25 mg per tablet Take 1 tablet by mouth once daily.     No current facility-administered medications for this visit.      Current Outpatient Prescriptions on File Prior to Visit   Medication Sig    ACCU-CHEK MULTICLIX LANCET lancets TEST DAILY AS DIRECTED    amlodipine (NORVASC) 10 MG tablet TAKE ONE TABLET BY MOUTH EVERY DAY    aspirin 81 MG Chew Take 81 mg by mouth once daily.    atorvastatin (LIPITOR) 80 MG tablet Take 1 tablet (80 mg total) by mouth once daily.    dulaglutide 1.5 mg/0.5 mL PnIj Inject 1.5 mg into the " "skin every 7 days.    fenofibrate micronized (LOFIBRA) 67 MG capsule Take 1 capsule (67 mg total) by mouth daily with breakfast.    furosemide (LASIX) 20 MG tablet Take 1 tablet (20 mg total) by mouth once daily.    insulin glargine (LANTUS SOLOSTAR) 100 unit/mL (3 mL) InPn pen Inject 40 Units into the skin every evening.    lancing device (MULTI-LANCET DEVICE) Misc 1 lancet by In Vitro route once daily.    metoprolol tartrate (LOPRESSOR) 100 MG tablet Take 1 tablet (100 mg total) by mouth 2 (two) times daily.    nitroGLYCERIN (NITROSTAT) 0.4 MG SL tablet Place 1 tablet (0.4 mg total) under the tongue every 5 (five) minutes as needed for Chest pain.    pen needle, diabetic (BD INSULIN PEN NEEDLE UF SHORT) 31 gauge x 5/16" Ndle AS DIRECTED    ranolazine (RANEXA) 500 MG Tb12 Take 1 tablet (500 mg total) by mouth 2 (two) times daily.    ticagrelor (BRILINTA) 90 mg tablet Take 1 tablet (90 mg total) by mouth 2 (two) times daily.    valsartan-hydrochlorothiazide (DIOVAN-HCT) 320-25 mg per tablet Take 1 tablet by mouth once daily.     No current facility-administered medications on file prior to visit.        Review of Systems   Constitution: Negative for decreased appetite, weakness, malaise/fatigue, weight gain and weight loss.   HENT: Negative for nosebleeds.    Cardiovascular: Negative for chest pain, claudication, dyspnea on exertion, irregular heartbeat, leg swelling, near-syncope, orthopnea, palpitations, paroxysmal nocturnal dyspnea and syncope.   Respiratory: Negative for cough, shortness of breath, sleep disturbances due to breathing, snoring and wheezing.    Hematologic/Lymphatic: Negative for bleeding problem. Does not bruise/bleed easily.   Skin: Negative for rash.   Musculoskeletal: Positive for back pain. Negative for arthritis, falls, joint pain, joint swelling, muscle cramps, muscle weakness and myalgias.   Gastrointestinal: Negative for bloating, abdominal pain, constipation, diarrhea, " heartburn, nausea and vomiting.   Genitourinary: Negative for dysuria, hematuria and nocturia.   Neurological: Positive for numbness and paresthesias (BLE ). Negative for excessive daytime sleepiness, dizziness, light-headedness, loss of balance and vertigo.       Objective:   Physical Exam   Constitutional: He is oriented to person, place, and time. He appears well-developed and well-nourished.   Neck: Neck supple. No JVD present.   Cardiovascular: Normal rate, regular rhythm and normal pulses.  Exam reveals no friction rub.    Murmur heard.  Pulmonary/Chest: Effort normal and breath sounds normal. No respiratory distress. He has no wheezes. He has no rales.   Mid sternal scar     Abdominal: Soft. Bowel sounds are normal. He exhibits no distension.   Musculoskeletal: He exhibits edema ( +1 BLE). He exhibits no tenderness.   Neurological: He is alert and oriented to person, place, and time.   Skin: Skin is warm and dry. No rash noted.   Psychiatric: He has a normal mood and affect. His behavior is normal.   Nursing note and vitals reviewed.    There were no vitals filed for this visit.  Lab Results   Component Value Date    CHOL 153 02/14/2017    CHOL 178 09/13/2016    CHOL 145 06/03/2016     Lab Results   Component Value Date    HDL 33 (L) 02/14/2017    HDL 38 (L) 09/13/2016    HDL 29 (L) 06/03/2016     Lab Results   Component Value Date    LDLCALC 97.2 02/14/2017    LDLCALC 119.2 09/13/2016    LDLCALC 98.6 06/03/2016     Lab Results   Component Value Date    TRIG 114 02/14/2017    TRIG 104 09/13/2016    TRIG 87 06/03/2016     Lab Results   Component Value Date    CHOLHDL 21.6 02/14/2017    CHOLHDL 21.3 09/13/2016    CHOLHDL 20.0 06/03/2016       Chemistry        Component Value Date/Time     02/14/2017 0819    K 3.6 02/14/2017 0819     02/14/2017 0819    CO2 24 02/14/2017 0819    BUN 28 (H) 02/14/2017 0819    CREATININE 2.0 (H) 02/14/2017 0819     02/14/2017 0819        Component Value Date/Time     CALCIUM 10.0 02/14/2017 0819    ALKPHOS 44 (L) 09/13/2016 0944    AST 40 09/13/2016 0944    ALT 49 (H) 09/13/2016 0944    BILITOT 0.6 09/13/2016 0944          Lab Results   Component Value Date    TSH 2.109 06/03/2016     Lab Results   Component Value Date    INR 1.0 05/15/2016     Lab Results   Component Value Date    WBC 4.79 02/14/2017    HGB 12.8 (L) 02/14/2017    HCT 36.2 (L) 02/14/2017    MCV 88 02/14/2017     02/14/2017     BMP  Sodium   Date Value Ref Range Status   02/14/2017 141 136 - 145 mmol/L Final     Potassium   Date Value Ref Range Status   02/14/2017 3.6 3.5 - 5.1 mmol/L Final     Chloride   Date Value Ref Range Status   02/14/2017 105 95 - 110 mmol/L Final     CO2   Date Value Ref Range Status   02/14/2017 24 23 - 29 mmol/L Final     BUN, Bld   Date Value Ref Range Status   02/14/2017 28 (H) 6 - 20 mg/dL Final     Creatinine   Date Value Ref Range Status   02/14/2017 2.0 (H) 0.5 - 1.4 mg/dL Final     Calcium   Date Value Ref Range Status   02/14/2017 10.0 8.7 - 10.5 mg/dL Final     Anion Gap   Date Value Ref Range Status   02/14/2017 12 8 - 16 mmol/L Final     eGFR if    Date Value Ref Range Status   02/14/2017 41.3 (A) >60 mL/min/1.73 m^2 Final     eGFR if non    Date Value Ref Range Status   02/14/2017 35.7 (A) >60 mL/min/1.73 m^2 Final     Comment:     Calculation used to obtain the estimated glomerular filtration  rate (eGFR) is the CKD-EPI equation. Since race is unknown   in our information system, the eGFR values for   -American and Non--American patients are given   for each creatinine result.       CrCl cannot be calculated (Unknown ideal weight.).    Assessment:     1. Coronary artery disease involving native coronary artery of native heart without angina pectoris    2. SOFIYA on CPAP    3. Central sleep apnea due to Cheyne-Pérez respiration    4. Hypertension associated with diabetes    5. Atherosclerosis of native coronary artery of  native heart without angina pectoris    6. Hyperlipidemia associated with type 2 diabetes mellitus    BP stable  No CNS complaints to suggest  Compliant with CPAP use  Stable CAD-Has take SL nitro about 3 times a week. Mild angina relieved with 1 SL nitro   A1C much better controlled  Lipids look ok  Renal function stable     Plan:   Add Imdur 30 mg nightly   He needs to take his Lasix daily for the next few days and then PRN  Low sodium heart healthy diet  Exercise program   RTC in 6 months with lipids, A1c, CMP

## 2017-05-05 RX ORDER — VALSARTAN AND HYDROCHLOROTHIAZIDE 320; 25 MG/1; MG/1
TABLET, FILM COATED ORAL
Qty: 30 TABLET | Refills: 11 | Status: SHIPPED | OUTPATIENT
Start: 2017-05-05 | End: 2018-04-10 | Stop reason: SDUPTHER

## 2017-05-08 RX ORDER — COLCHICINE 0.6 MG/1
TABLET, FILM COATED ORAL
Qty: 90 TABLET | Refills: 0 | Status: SHIPPED | OUTPATIENT
Start: 2017-05-08 | End: 2017-05-24

## 2017-05-08 NOTE — TELEPHONE ENCOUNTER
"S/w pt. Requested a "refill of my gout medicine". I advised that Dr. Gómez sent in a refill of Colcrys to [facility]. Pt verbalized understanding/TGD  "

## 2017-05-08 NOTE — TELEPHONE ENCOUNTER
----- Message from Jericho Gandara sent at 5/8/2017  1:08 PM CDT -----  Contact: [phone]  States he needs to speak with the nurse in regards to a Rx he dropped off./ Pt can be reached at [phone]

## 2017-05-18 RX ORDER — METOPROLOL TARTRATE 100 MG/1
TABLET ORAL
Qty: 180 TABLET | Refills: 3 | Status: SHIPPED | OUTPATIENT
Start: 2017-05-18 | End: 2018-02-19 | Stop reason: SDUPTHER

## 2017-05-18 RX ORDER — NITROGLYCERIN 0.4 MG/1
TABLET SUBLINGUAL
Qty: 30 TABLET | Refills: 11 | Status: SHIPPED | OUTPATIENT
Start: 2017-05-18 | End: 2018-01-02 | Stop reason: SDUPTHER

## 2017-05-22 DIAGNOSIS — I25.10 CORONARY ARTERY DISEASE INVOLVING NATIVE CORONARY ARTERY OF NATIVE HEART WITHOUT ANGINA PECTORIS: Primary | ICD-10-CM

## 2017-05-23 ENCOUNTER — CLINICAL SUPPORT (OUTPATIENT)
Dept: CARDIOLOGY | Facility: CLINIC | Age: 57
End: 2017-05-23
Payer: COMMERCIAL

## 2017-05-23 DIAGNOSIS — I25.10 CORONARY ARTERY DISEASE INVOLVING NATIVE CORONARY ARTERY OF NATIVE HEART WITHOUT ANGINA PECTORIS: ICD-10-CM

## 2017-05-23 LAB
ESTIMATED PA SYSTOLIC PRESSURE: 27.6
MITRAL VALVE REGURGITATION: ABNORMAL
RETIRED EF AND QEF - SEE NOTES: 35 (ref 55–65)

## 2017-05-23 PROCEDURE — 93307 TTE W/O DOPPLER COMPLETE: CPT | Mod: S$GLB,,, | Performed by: INTERNAL MEDICINE

## 2017-05-24 ENCOUNTER — OFFICE VISIT (OUTPATIENT)
Dept: CARDIOLOGY | Facility: CLINIC | Age: 57
End: 2017-05-24
Payer: COMMERCIAL

## 2017-05-24 ENCOUNTER — LAB VISIT (OUTPATIENT)
Dept: LAB | Facility: HOSPITAL | Age: 57
End: 2017-05-24
Attending: INTERNAL MEDICINE
Payer: COMMERCIAL

## 2017-05-24 VITALS
HEART RATE: 70 BPM | WEIGHT: 240.75 LBS | DIASTOLIC BLOOD PRESSURE: 70 MMHG | HEIGHT: 72 IN | SYSTOLIC BLOOD PRESSURE: 112 MMHG | BODY MASS INDEX: 32.61 KG/M2

## 2017-05-24 DIAGNOSIS — E11.21 TYPE 2 DIABETES MELLITUS WITH DIABETIC NEPHROPATHY, WITH LONG-TERM CURRENT USE OF INSULIN: ICD-10-CM

## 2017-05-24 DIAGNOSIS — Z01.810 PREOP CARDIOVASCULAR EXAM: Primary | ICD-10-CM

## 2017-05-24 DIAGNOSIS — N18.30 CHRONIC KIDNEY DISEASE, STAGE III (MODERATE): ICD-10-CM

## 2017-05-24 DIAGNOSIS — I25.10 CORONARY ARTERY DISEASE INVOLVING NATIVE CORONARY ARTERY OF NATIVE HEART WITHOUT ANGINA PECTORIS: ICD-10-CM

## 2017-05-24 DIAGNOSIS — E11.69 HYPERLIPIDEMIA ASSOCIATED WITH TYPE 2 DIABETES MELLITUS: ICD-10-CM

## 2017-05-24 DIAGNOSIS — R06.3 CENTRAL SLEEP APNEA DUE TO CHEYNE-STOKES RESPIRATION: ICD-10-CM

## 2017-05-24 DIAGNOSIS — Z79.4 TYPE 2 DIABETES MELLITUS WITH DIABETIC NEPHROPATHY, WITH LONG-TERM CURRENT USE OF INSULIN: ICD-10-CM

## 2017-05-24 DIAGNOSIS — G47.33 OSA ON CPAP: ICD-10-CM

## 2017-05-24 DIAGNOSIS — E78.5 HYPERLIPIDEMIA ASSOCIATED WITH TYPE 2 DIABETES MELLITUS: ICD-10-CM

## 2017-05-24 DIAGNOSIS — E11.59 HYPERTENSION ASSOCIATED WITH DIABETES: ICD-10-CM

## 2017-05-24 DIAGNOSIS — I15.2 HYPERTENSION ASSOCIATED WITH DIABETES: ICD-10-CM

## 2017-05-24 LAB
ANION GAP SERPL CALC-SCNC: 10 MMOL/L
BASOPHILS # BLD AUTO: 0.02 K/UL
BASOPHILS NFR BLD: 0.4 %
BUN SERPL-MCNC: 27 MG/DL
CALCIUM SERPL-MCNC: 10.6 MG/DL
CHLORIDE SERPL-SCNC: 103 MMOL/L
CO2 SERPL-SCNC: 25 MMOL/L
CREAT SERPL-MCNC: 2.1 MG/DL
DIFFERENTIAL METHOD: ABNORMAL
EOSINOPHIL # BLD AUTO: 0.2 K/UL
EOSINOPHIL NFR BLD: 3.4 %
ERYTHROCYTE [DISTWIDTH] IN BLOOD BY AUTOMATED COUNT: 13.6 %
EST. GFR  (AFRICAN AMERICAN): 38.9 ML/MIN/1.73 M^2
EST. GFR  (NON AFRICAN AMERICAN): 33.7 ML/MIN/1.73 M^2
GLUCOSE SERPL-MCNC: 160 MG/DL
HCT VFR BLD AUTO: 37.3 %
HGB BLD-MCNC: 12.9 G/DL
LYMPHOCYTES # BLD AUTO: 1.6 K/UL
LYMPHOCYTES NFR BLD: 28.7 %
MCH RBC QN AUTO: 29.9 PG
MCHC RBC AUTO-ENTMCNC: 34.6 %
MCV RBC AUTO: 87 FL
MONOCYTES # BLD AUTO: 0.5 K/UL
MONOCYTES NFR BLD: 8.1 %
NEUTROPHILS # BLD AUTO: 3.3 K/UL
NEUTROPHILS NFR BLD: 59.2 %
PLATELET # BLD AUTO: 245 K/UL
PMV BLD AUTO: 9.5 FL
POTASSIUM SERPL-SCNC: 3.9 MMOL/L
RBC # BLD AUTO: 4.31 M/UL
SODIUM SERPL-SCNC: 138 MMOL/L
WBC # BLD AUTO: 5.54 K/UL

## 2017-05-24 PROCEDURE — 3044F HG A1C LEVEL LT 7.0%: CPT | Mod: S$GLB,,, | Performed by: NURSE PRACTITIONER

## 2017-05-24 PROCEDURE — 99999 PR PBB SHADOW E&M-EST. PATIENT-LVL III: CPT | Mod: PBBFAC,,, | Performed by: NURSE PRACTITIONER

## 2017-05-24 PROCEDURE — 85025 COMPLETE CBC W/AUTO DIFF WBC: CPT

## 2017-05-24 PROCEDURE — 80048 BASIC METABOLIC PNL TOTAL CA: CPT

## 2017-05-24 PROCEDURE — 99214 OFFICE O/P EST MOD 30 MIN: CPT | Mod: S$GLB,,, | Performed by: NURSE PRACTITIONER

## 2017-05-24 PROCEDURE — 36415 COLL VENOUS BLD VENIPUNCTURE: CPT

## 2017-05-24 PROCEDURE — 3066F NEPHROPATHY DOC TX: CPT | Mod: S$GLB,,, | Performed by: NURSE PRACTITIONER

## 2017-05-24 PROCEDURE — 3078F DIAST BP <80 MM HG: CPT | Mod: S$GLB,,, | Performed by: NURSE PRACTITIONER

## 2017-05-24 PROCEDURE — 3074F SYST BP LT 130 MM HG: CPT | Mod: S$GLB,,, | Performed by: NURSE PRACTITIONER

## 2017-05-24 NOTE — PROGRESS NOTES
Subjective:   Patient ID:  Christiano Cox is a 57 y.o. male who presents for follow up of Pre-op Exam      HPI  Patient presents to clinic seeking clearance for back surgery. He has PMH CAD, NSTEMI, sleep apnea in which he uses CPAP nightly, DM, HLP, HTN, CABG x3. P sunitha underwent angiogram on 5/16/16 for NSTEMI that showed  occluded rca, lad and lcx, occluded svg to pda, severe proximal stenosis of the svg to om treated with resolute stent and filter wire protection. Also noted to have patent lima to lad and Normal filling pressure. Successful PCI with GRAHAM to SVG to OM1.  He has been having back pain and problems with losing his balance and has shocking, shooting pain down both legs.  He is planning to undergo back surgery in the near future Dr. Wray at [facility].     He denies any chest pain, SOB, GOODMAN, dizziness, near syncope, edema, orthopnea, PND. Has no abnormal bleeding on dual antiplatelets post PIC of SVG to OM1 in May 2016. Has not had to use SL nitro in over 2 months. He uses his CPAP as often as possible. Wears greater than 90 % of the time. He has not had any hospital admissions for cardiac related events within the last year. Has good activity tolerance. He had recent 2D echo that shows some decline in EF from 45%  In 2016 to 35% He denies any symptoms to suggest decompensated HF.         Past Medical History:   Diagnosis Date    Acute coronary syndrome     Coronary atherosclerosis of unspecified type of vessel, native or graft     s/p cabg and stents    DM (diabetes mellitus), type 2 with complications     History of gout     Hyperlipidemia associated with type 2 diabetes mellitus     Hypertension associated with diabetes     Obesity, unspecified     SOFIYA on CPAP     S/P CABG x 3     Type II diabetes mellitus with renal manifestations        Past Surgical History:   Procedure Laterality Date    CARDIAC CATHETERIZATION      CORONARY ANGIOPLASTY      CORONARY ARTERY  "BYPASS GRAFT         Social History   Substance Use Topics    Smoking status: Never Smoker    Smokeless tobacco: Never Used    Alcohol use Yes       Family History   Problem Relation Age of Onset    Diabetes Mother     Hypertension Father     Stroke Sister     Diabetes Sister     Heart disease Brother     Diabetes Brother     Hypertension Brother        Current Outpatient Prescriptions   Medication Sig    ACCU-CHEK MULTICLIX LANCET lancets TEST DAILY AS DIRECTED    ACCU-CHEK MULTICLIX LANCET lancets USE AS DIRECTED.    amlodipine (NORVASC) 10 MG tablet TAKE ONE TABLET BY MOUTH EVERY DAY    aspirin 81 MG Chew Take 81 mg by mouth once daily.    dulaglutide 1.5 mg/0.5 mL PnIj Inject 1.5 mg into the skin every 7 days.    fenofibrate micronized (LOFIBRA) 67 MG capsule Take 1 capsule (67 mg total) by mouth daily with breakfast.    furosemide (LASIX) 20 MG tablet Take 1 tablet (20 mg total) by mouth once daily.    gabapentin (NEURONTIN) 300 MG capsule ONE BY MOUTH EVERY TWELVE HOURS    insulin glargine (LANTUS SOLOSTAR) 100 unit/mL (3 mL) InPn pen Inject 40 Units into the skin every evening.    isosorbide mononitrate (IMDUR) 30 MG 24 hr tablet Take 1 tablet (30 mg total) by mouth once daily.    metoprolol tartrate (LOPRESSOR) 100 MG tablet Take 1 tablet (100 mg total) by mouth 2 (two) times daily.    nitroGLYCERIN (NITROSTAT) 0.4 MG SL tablet Place 1 tablet (0.4 mg total) under the tongue every 5 (five) minutes as needed for Chest pain.    ranolazine (RANEXA) 500 MG Tb12 Take 1 tablet (500 mg total) by mouth 2 (two) times daily.    ticagrelor (BRILINTA) 90 mg tablet Take 1 tablet (90 mg total) by mouth 2 (two) times daily.    valsartan-hydrochlorothiazide (DIOVAN-HCT) 320-25 mg per tablet TAKE ONE TABLET BY MOUTH EVERY DAY    pen needle, diabetic (BD INSULIN PEN NEEDLE UF SHORT) 31 gauge x 5/16" Ndle AS DIRECTED     No current facility-administered medications for this visit.      Current " "Outpatient Prescriptions on File Prior to Visit   Medication Sig    ACCU-CHEK MULTICLIX LANCET lancets TEST DAILY AS DIRECTED    ACCU-CHEK MULTICLIX LANCET lancets USE AS DIRECTED.    amlodipine (NORVASC) 10 MG tablet TAKE ONE TABLET BY MOUTH EVERY DAY    aspirin 81 MG Chew Take 81 mg by mouth once daily.    dulaglutide 1.5 mg/0.5 mL PnIj Inject 1.5 mg into the skin every 7 days.    fenofibrate micronized (LOFIBRA) 67 MG capsule Take 1 capsule (67 mg total) by mouth daily with breakfast.    furosemide (LASIX) 20 MG tablet Take 1 tablet (20 mg total) by mouth once daily.    gabapentin (NEURONTIN) 300 MG capsule ONE BY MOUTH EVERY TWELVE HOURS    insulin glargine (LANTUS SOLOSTAR) 100 unit/mL (3 mL) InPn pen Inject 40 Units into the skin every evening.    isosorbide mononitrate (IMDUR) 30 MG 24 hr tablet Take 1 tablet (30 mg total) by mouth once daily.    metoprolol tartrate (LOPRESSOR) 100 MG tablet Take 1 tablet (100 mg total) by mouth 2 (two) times daily.    nitroGLYCERIN (NITROSTAT) 0.4 MG SL tablet Place 1 tablet (0.4 mg total) under the tongue every 5 (five) minutes as needed for Chest pain.    ranolazine (RANEXA) 500 MG Tb12 Take 1 tablet (500 mg total) by mouth 2 (two) times daily.    ticagrelor (BRILINTA) 90 mg tablet Take 1 tablet (90 mg total) by mouth 2 (two) times daily.    valsartan-hydrochlorothiazide (DIOVAN-HCT) 320-25 mg per tablet TAKE ONE TABLET BY MOUTH EVERY DAY    pen needle, diabetic (BD INSULIN PEN NEEDLE UF SHORT) 31 gauge x 5/16" Ndle AS DIRECTED    [DISCONTINUED] COLCRYS 0.6 mg tablet One with onset of attack and then every hour until resolved, up to 5 tablets     No current facility-administered medications on file prior to visit.        Review of Systems   Constitution: Negative for decreased appetite, weakness, malaise/fatigue, weight gain and weight loss.   HENT: Negative for nosebleeds.    Cardiovascular: Negative for chest pain, claudication, dyspnea on exertion, " irregular heartbeat, leg swelling, near-syncope, orthopnea, palpitations, paroxysmal nocturnal dyspnea and syncope.   Respiratory: Negative for cough, shortness of breath, sleep disturbances due to breathing, snoring and wheezing.    Hematologic/Lymphatic: Negative for bleeding problem. Does not bruise/bleed easily.   Skin: Negative for rash.   Musculoskeletal: Positive for back pain and muscle weakness. Negative for arthritis, falls, joint pain, joint swelling, muscle cramps and myalgias.   Gastrointestinal: Negative for bloating, abdominal pain, constipation, diarrhea, heartburn, nausea and vomiting.   Genitourinary: Negative for dysuria, hematuria and nocturia.   Neurological: Positive for numbness and paresthesias. Negative for excessive daytime sleepiness, dizziness, light-headedness, loss of balance and vertigo.       Objective:   Physical Exam   Constitutional: He is oriented to person, place, and time. He appears well-developed and well-nourished.   Neck: Neck supple. No JVD present.   Cardiovascular: Normal rate, regular rhythm, normal heart sounds and normal pulses.  Exam reveals no friction rub.    No murmur heard.  Pulmonary/Chest: Effort normal and breath sounds normal. No respiratory distress. He has no wheezes. He has no rales.   Sternal scar    Abdominal: Soft. Bowel sounds are normal. He exhibits no distension.   Musculoskeletal: He exhibits no edema or tenderness.   Neurological: He is alert and oriented to person, place, and time.   Skin: Skin is warm and dry. No rash noted.   Psychiatric: He has a normal mood and affect. His behavior is normal.   Nursing note and vitals reviewed.    Vitals:    05/24/17 0928   BP: 112/70   BP Location: Right arm   Patient Position: Sitting   BP Method: Manual   Pulse: 70   Weight: 109.2 kg (240 lb 11.9 oz)   Height: 6' (1.829 m)     Lab Results   Component Value Date    CHOL 153 02/14/2017    CHOL 178 09/13/2016    CHOL 145 06/03/2016     Lab Results   Component  Value Date    HDL 33 (L) 02/14/2017    HDL 38 (L) 09/13/2016    HDL 29 (L) 06/03/2016     Lab Results   Component Value Date    LDLCALC 97.2 02/14/2017    LDLCALC 119.2 09/13/2016    LDLCALC 98.6 06/03/2016     Lab Results   Component Value Date    TRIG 114 02/14/2017    TRIG 104 09/13/2016    TRIG 87 06/03/2016     Lab Results   Component Value Date    CHOLHDL 21.6 02/14/2017    CHOLHDL 21.3 09/13/2016    CHOLHDL 20.0 06/03/2016       Chemistry        Component Value Date/Time     02/14/2017 0819    K 3.6 02/14/2017 0819     02/14/2017 0819    CO2 24 02/14/2017 0819    BUN 28 (H) 02/14/2017 0819    CREATININE 2.0 (H) 02/14/2017 0819     02/14/2017 0819        Component Value Date/Time    CALCIUM 10.0 02/14/2017 0819    ALKPHOS 44 (L) 09/13/2016 0944    AST 40 09/13/2016 0944    ALT 49 (H) 09/13/2016 0944    BILITOT 0.6 09/13/2016 0944          Lab Results   Component Value Date    TSH 2.109 06/03/2016     Lab Results   Component Value Date    INR 1.0 05/15/2016     Lab Results   Component Value Date    WBC 4.79 02/14/2017    HGB 12.8 (L) 02/14/2017    HCT 36.2 (L) 02/14/2017    MCV 88 02/14/2017     02/14/2017     BMP  Sodium   Date Value Ref Range Status   02/14/2017 141 136 - 145 mmol/L Final     Potassium   Date Value Ref Range Status   02/14/2017 3.6 3.5 - 5.1 mmol/L Final     Chloride   Date Value Ref Range Status   02/14/2017 105 95 - 110 mmol/L Final     CO2   Date Value Ref Range Status   02/14/2017 24 23 - 29 mmol/L Final     BUN, Bld   Date Value Ref Range Status   02/14/2017 28 (H) 6 - 20 mg/dL Final     Creatinine   Date Value Ref Range Status   02/14/2017 2.0 (H) 0.5 - 1.4 mg/dL Final     Calcium   Date Value Ref Range Status   02/14/2017 10.0 8.7 - 10.5 mg/dL Final     Anion Gap   Date Value Ref Range Status   02/14/2017 12 8 - 16 mmol/L Final     eGFR if    Date Value Ref Range Status   02/14/2017 41.3 (A) >60 mL/min/1.73 m^2 Final     eGFR if non African  American   Date Value Ref Range Status   02/14/2017 35.7 (A) >60 mL/min/1.73 m^2 Final     Comment:     Calculation used to obtain the estimated glomerular filtration  rate (eGFR) is the CKD-EPI equation. Since race is unknown   in our information system, the eGFR values for   -American and Non--American patients are given   for each creatinine result.       CrCl cannot be calculated (Patient's most recent sCr result is older than the maximum 14 days allowed.).    Assessment:     1. Preop cardiovascular exam    2. SOFIYA on CPAP    3. Central sleep apnea due to Cheyne-Pérez respiration    4. Hypertension associated with diabetes    5. Coronary artery disease involving native coronary artery of native heart without angina pectoris    6. Type 2 diabetes mellitus with diabetic nephropathy, with long-term current use of insulin    7. Hyperlipidemia associated with type 2 diabetes mellitus    Patient has no angina or equivalent  BP stable  Using CPAP  No CNS complaints to suggest TIA or CVA  No symptoms to suggest decompensated HF  Diabetes well controlled   No hospital admissions for cardiac related events  Good activity tolerance     Plan:     Patient clear for back surgery at moderate risk for cardiac event. He is clear to hold ASA and Brilinta for 1 week prior to surgery and resume right after surgery.   RTC in 6 months

## 2017-05-26 NOTE — TELEPHONE ENCOUNTER
I spoke with pt. He states [facility] need his clearance faxed.   Fax # [phone]    Fax sent successfully

## 2017-05-26 NOTE — TELEPHONE ENCOUNTER
----- Message from Joe Townsend sent at 5/26/2017  1:43 PM CDT -----  Contact: pt  He's calling in regards to a missed call, please advise, [phone] (cell)

## 2017-05-29 ENCOUNTER — OFFICE VISIT (OUTPATIENT)
Dept: NEPHROLOGY | Facility: CLINIC | Age: 57
End: 2017-05-29
Payer: COMMERCIAL

## 2017-05-29 VITALS
HEART RATE: 34 BPM | BODY MASS INDEX: 32.67 KG/M2 | WEIGHT: 241.19 LBS | SYSTOLIC BLOOD PRESSURE: 130 MMHG | HEIGHT: 72 IN | DIASTOLIC BLOOD PRESSURE: 80 MMHG

## 2017-05-29 DIAGNOSIS — I10 ESSENTIAL HYPERTENSION: ICD-10-CM

## 2017-05-29 DIAGNOSIS — N25.81 SECONDARY HYPERPARATHYROIDISM: ICD-10-CM

## 2017-05-29 DIAGNOSIS — N18.30 CHRONIC KIDNEY DISEASE, STAGE III (MODERATE): Primary | ICD-10-CM

## 2017-05-29 DIAGNOSIS — E83.52 HYPERCALCEMIA: ICD-10-CM

## 2017-05-29 DIAGNOSIS — R80.9 MICROALBUMINURIA DUE TO TYPE 2 DIABETES MELLITUS: ICD-10-CM

## 2017-05-29 DIAGNOSIS — E11.21 DIABETIC NEPHROPATHY ASSOCIATED WITH TYPE 2 DIABETES MELLITUS: ICD-10-CM

## 2017-05-29 DIAGNOSIS — E11.29 MICROALBUMINURIA DUE TO TYPE 2 DIABETES MELLITUS: ICD-10-CM

## 2017-05-29 PROCEDURE — 3066F NEPHROPATHY DOC TX: CPT | Mod: S$GLB,,, | Performed by: INTERNAL MEDICINE

## 2017-05-29 PROCEDURE — 99999 PR PBB SHADOW E&M-EST. PATIENT-LVL III: CPT | Mod: PBBFAC,,, | Performed by: INTERNAL MEDICINE

## 2017-05-29 PROCEDURE — 99215 OFFICE O/P EST HI 40 MIN: CPT | Mod: S$GLB,,, | Performed by: INTERNAL MEDICINE

## 2017-05-29 PROCEDURE — 3044F HG A1C LEVEL LT 7.0%: CPT | Mod: S$GLB,,, | Performed by: INTERNAL MEDICINE

## 2017-05-30 NOTE — PROGRESS NOTES
NEPHROLOGY CLINIC FOLLOWUP NOTE    REASON FOR FOLLOWUP AND CHIEF COMPLAINT:  History of chronic kidney disease.    The second reason for followup and clinic is to request preop clearance.    ORTHOPEDIC SURGEON:  Mendoza Wray MD    HISTORY OF PRESENT ILLNESS:  Mr. Cox is a 57-year-old  male   with the history of diabetes mellitus, hypertension and CKD stage III, who   presents for followup.  The patient was last seen by me about five months ago.    He says he is feeling well today.  No acute issues, no new problems, no chest   pain, no shortness of breath, no leg swelling.  He has been taking his   medications as prescribed and there are no new or active issues today.    Regarding the preop clearance, he has a history of a herniated disk and he is in   the process of getting having an operation done.  Regarding the preop   clearance, his medications and laboratory values were reviewed with him.  He   says that the cardiac clearance has already been obtained.    PAST MEDICAL HISTORY:  1.  CKD stage III, estimated GFR about 40 mL/min, baseline creatinine is close   to 2 mg/dL.  2.  Hypertension.  3.  Diabetes mellitus for at least 10 years.  4.  History of coronary artery disease with past history of MI at the age of   19/premature heart disease and history of CABG in 2001.  5.  Diastolic congestive heart failure.  6.  Obstructive sleep apnea, on CPAP.  7.  Gout.  8.  Restless leg syndrome.  9.  Hyperlipidemia.  10.  Bursitis of the right elbow.    PAST SURGICAL HISTORY:  Reviewed as above unchanged.    FAMILY HISTORY:  Reviewed and unchanged.  Mother passed away at the age of 52   from pancreatic cancer.  Multiple family members with diabetes mellitus.  Father   is alive and well in his 70s.    ALLERGIES:  Reviewed.  No known drug allergies.    SOCIAL HISTORY:  Negative for smoking.  No alcohol use.    MEDICATIONS:  Reviewed.  Aspirin 81 mg daily, amlodipine 10 mg daily,   fenofibrate 67 mg daily,  Lasix 20 mg daily, Neurontin 300 mg b.i.d., insulin,   metoprolol 100 mg b.i.d., Ranexa 500 mg b.i.d., valsartan/HCTZ 320/25 one p.o.   daily, Brilinta 90 mg b.i.d.  1.  Sinus already.    REVIEW OF SYSTEMS:  No recent hospitalizations.  GENERAL:  Negative.  HEAD, EYES, EARS, NOSE AND THROAT:  Negative.  CARDIAC:  Negative.  PULMONARY:  Negative.  GASTROINTESTINAL:  Negative.  GENITOURINARY:  Negative.  PSYCHOLOGICAL:  Negative.  NEUROLOGICAL:  Negative.  ENDOCRINE:  As above, otherwise negative.  HEMATOLOGIC AND ONCOLOGIC:  Negative.  INFECTIOUS DISEASE:  Negative.  The rest of the review of systems negative.    PHYSICAL EXAMINATION:  VITAL SIGNS:  Blood pressure is 130/80, pulse is 64, weight is 241 pounds   compared to 248 pounds last visit.  GENERAL:  He is cooperative, pleasant, atraumatic, normocephalic, well   developed, well nourished.  Speech and thought process appropriate, normal.  HEENT:  Mucous membranes moist.  NECK:  No JVD.  HEART:  Regular rate and rhythm, S1 and S2 audible.  No rubs.  CHEST:  Clear to auscultation.  No rales.  No wheezes.  Breathing symmetric and   unlabored.  EXTREMITIES:  No edema.    LABORATORY:  Reviewed.  Creatinine is 2.1, sodium 138, potassium 3.9, chloride   103, bicarbonate 25, calcium is 10.6.  Albumin, previously was 3.9.  White count   is 5.5, hemoglobin 12.9, hematocrit is 37.3.  PSA was 0.6.    ASSESSMENT AND PLAN:  This is a 57-year-old man with CKD stage III, who presents   for followup.  The patient requires preop clearance for orthopedic surgery.    The impression is as follows:  1.  Renal.  The patient's renal function is stable.  Creatinine has not changed,   has not worsened, has CKD stage III.  The patient is doing well from our point   of view.  2.  Electrolytes were reviewed.  Potassium is normal and so is the acid base   that is stable.  The patient does have mild hypercalcemia.  This is related to   the patient taking hydrochlorothiazide.  At this point  because he is also on   Lasix, thus the latter medicine will keep the calcium in check.  Therefore, we   will continue both diuretics.  3.  Cardiac.  The patient has a significant history of heart disease.  He has a   propensity to have fluid gain; therefore it is fine to keep him on two   diuretics, Lasix and HCTZ.  Please see above for further details:  4.  He has diabetes mellitus and a past history of proteinuria, which is very   mild/microalbuminuria likely consistent with diabetic nephropathy.  His   hemoglobin A1c has improved overall.  The last one is 6.6%.  Diet and lifestyle   modifications discussed with the patient.  5.  He has very mild secondary hyperparathyroidism with the intact PTH of 85.    We will monitor for now.  No need for treatment.  6.  Preop screening.  From our point of view that this is from a point of view,   he is cleared for surgery as he has normal potassium, well controlled blood   pressure, and an unchanged and stable serum creatinine.  Therefore, he is   cleared.  If there are any further questions, please contact us.    PLANS AND RECOMMENDATIONS:  As discussed above.  No medication changes.  He will   return to see us in about four to five months in November 2017.    Total time spent 40 minutes, more than 50% of the time was spent on counseling   coordination of care.  Level V visit.      AK/ERICK  dd: 05/30/2017 09:51:39 (CDT)  td: 05/30/2017 19:04:53 (CDT)  Doc ID   #[id number]  Job ID #[id number]    CC: Mendoza Wray M.D.    [id number]

## 2017-06-06 LAB
A1C: 7.7
CHOLEST SERPL-MSCNC: 161 MG/DL (ref 0–200)
GLUCOSE: 108
HDLC SERPL-MCNC: 36 MG/DL
LDL/HDL RATIO: 2.9
LDLC SERPL CALC-MCNC: 103 MG/DL (ref 0–160)
TRIGL SERPL-MCNC: 108 MG/DL (ref 40–160)

## 2017-06-22 DIAGNOSIS — I25.10 CORONARY ARTERY DISEASE INVOLVING NATIVE CORONARY ARTERY OF NATIVE HEART WITHOUT ANGINA PECTORIS: ICD-10-CM

## 2017-06-22 DIAGNOSIS — I25.10 ATHEROSCLEROSIS OF NATIVE CORONARY ARTERY OF NATIVE HEART WITHOUT ANGINA PECTORIS: ICD-10-CM

## 2017-06-22 DIAGNOSIS — Z95.1 HX OF CABG: ICD-10-CM

## 2017-06-22 RX ORDER — RANOLAZINE 500 MG/1
TABLET, FILM COATED, EXTENDED RELEASE ORAL
Qty: 180 TABLET | Refills: 3 | Status: SHIPPED | OUTPATIENT
Start: 2017-06-22 | End: 2017-08-22 | Stop reason: SDUPTHER

## 2017-06-22 RX ORDER — TICAGRELOR 90 MG/1
TABLET ORAL
Qty: 180 TABLET | Refills: 3 | Status: SHIPPED | OUTPATIENT
Start: 2017-06-22 | End: 2017-08-22 | Stop reason: SDUPTHER

## 2017-08-17 ENCOUNTER — PATIENT MESSAGE (OUTPATIENT)
Dept: RESEARCH | Facility: HOSPITAL | Age: 57
End: 2017-08-17

## 2017-08-22 ENCOUNTER — OFFICE VISIT (OUTPATIENT)
Dept: CARDIOLOGY | Facility: CLINIC | Age: 57
End: 2017-08-22
Payer: COMMERCIAL

## 2017-08-22 VITALS
WEIGHT: 239.19 LBS | BODY MASS INDEX: 32.4 KG/M2 | DIASTOLIC BLOOD PRESSURE: 60 MMHG | HEART RATE: 64 BPM | HEIGHT: 72 IN | SYSTOLIC BLOOD PRESSURE: 124 MMHG

## 2017-08-22 DIAGNOSIS — E11.59 HYPERTENSION ASSOCIATED WITH DIABETES: ICD-10-CM

## 2017-08-22 DIAGNOSIS — G47.33 OSA ON CPAP: ICD-10-CM

## 2017-08-22 DIAGNOSIS — I25.10 ATHEROSCLEROSIS OF NATIVE CORONARY ARTERY OF NATIVE HEART WITHOUT ANGINA PECTORIS: ICD-10-CM

## 2017-08-22 DIAGNOSIS — E11.21 TYPE 2 DIABETES MELLITUS WITH DIABETIC NEPHROPATHY, WITH LONG-TERM CURRENT USE OF INSULIN: ICD-10-CM

## 2017-08-22 DIAGNOSIS — Z79.4 TYPE 2 DIABETES MELLITUS WITH DIABETIC NEPHROPATHY, WITH LONG-TERM CURRENT USE OF INSULIN: ICD-10-CM

## 2017-08-22 DIAGNOSIS — I21.4 NSTEMI (NON-ST ELEVATED MYOCARDIAL INFARCTION): ICD-10-CM

## 2017-08-22 DIAGNOSIS — R06.3 CENTRAL SLEEP APNEA DUE TO CHEYNE-STOKES RESPIRATION: ICD-10-CM

## 2017-08-22 DIAGNOSIS — E78.5 HYPERLIPIDEMIA ASSOCIATED WITH TYPE 2 DIABETES MELLITUS: ICD-10-CM

## 2017-08-22 DIAGNOSIS — I25.10 CORONARY ARTERY DISEASE INVOLVING NATIVE CORONARY ARTERY OF NATIVE HEART WITHOUT ANGINA PECTORIS: Primary | ICD-10-CM

## 2017-08-22 DIAGNOSIS — I15.2 HYPERTENSION ASSOCIATED WITH DIABETES: ICD-10-CM

## 2017-08-22 DIAGNOSIS — E11.69 HYPERLIPIDEMIA ASSOCIATED WITH TYPE 2 DIABETES MELLITUS: ICD-10-CM

## 2017-08-22 DIAGNOSIS — Z95.1 HX OF CABG: ICD-10-CM

## 2017-08-22 PROCEDURE — 99999 PR PBB SHADOW E&M-EST. PATIENT-LVL III: CPT | Mod: PBBFAC,,, | Performed by: NURSE PRACTITIONER

## 2017-08-22 PROCEDURE — 3074F SYST BP LT 130 MM HG: CPT | Mod: S$GLB,,, | Performed by: NURSE PRACTITIONER

## 2017-08-22 PROCEDURE — 3066F NEPHROPATHY DOC TX: CPT | Mod: S$GLB,,, | Performed by: NURSE PRACTITIONER

## 2017-08-22 PROCEDURE — 3044F HG A1C LEVEL LT 7.0%: CPT | Mod: S$GLB,,, | Performed by: NURSE PRACTITIONER

## 2017-08-22 PROCEDURE — 3078F DIAST BP <80 MM HG: CPT | Mod: S$GLB,,, | Performed by: NURSE PRACTITIONER

## 2017-08-22 PROCEDURE — 3008F BODY MASS INDEX DOCD: CPT | Mod: S$GLB,,, | Performed by: NURSE PRACTITIONER

## 2017-08-22 PROCEDURE — 93000 ELECTROCARDIOGRAM COMPLETE: CPT | Mod: S$GLB,,, | Performed by: NUCLEAR MEDICINE

## 2017-08-22 PROCEDURE — 99214 OFFICE O/P EST MOD 30 MIN: CPT | Mod: S$GLB,,, | Performed by: NURSE PRACTITIONER

## 2017-08-22 RX ORDER — ATORVASTATIN CALCIUM 80 MG/1
TABLET, FILM COATED ORAL
Refills: 3 | COMMUNITY
Start: 2017-06-09 | End: 2018-09-25

## 2017-08-22 RX ORDER — RANOLAZINE 500 MG/1
500 TABLET, EXTENDED RELEASE ORAL 2 TIMES DAILY
Qty: 180 TABLET | Refills: 3 | Status: SHIPPED | OUTPATIENT
Start: 2017-08-22 | End: 2018-09-18 | Stop reason: SDUPTHER

## 2017-08-22 RX ORDER — INSULIN GLARGINE AND LIXISENATIDE 100; 33 U/ML; UG/ML
INJECTION, SOLUTION SUBCUTANEOUS
Refills: 1 | COMMUNITY
Start: 2017-08-16 | End: 2018-03-20

## 2017-08-22 NOTE — PROGRESS NOTES
Subjective:   Patient ID:  Christiano Cox is a 57 y.o. male who presents for follow up of Coronary Artery Disease      HPI   Patient presents to clinic , with no complaints,  for follow up and management of CAD. He has PMH CAD, NSTEMI, sleep apnea in which he uses CPAP nightly, DM, HLP, HTN, CABG x3. He had 2D echo that shows decline in EF from 45% in 2016 to 35% in May 2017.  Patient underwent angiogram on 5/16/16 for NSTEMI that showed occluded rca, lad and lcx, occluded svg to pda, severe proximal stenosis of the svg to om treated with resolute stent and filter wire protection. Also noted to have patent lima to lad and Normal filling pressure. Successful PCI with GRAHAM to SVG to OM1. Uses CPAP nightly. Denies any chest pain, sob, orthopnea, PND, edema, dizziness, near syncope or syncope. Has not had to use SL nitro. Had back surgery in May 2017 and tolerated well. No longer has leg pain, just some stiffness to his back. He feels good and has no complaints. Is followed by Nephrology for CKD. Renal function stable in Feb 2017. A1C in Feb was 6.6.  EKG today shows NSR with 1st degree AV block with occasional PVCs.     Past Medical History:   Diagnosis Date    Acute coronary syndrome     Coronary atherosclerosis of unspecified type of vessel, native or graft     s/p cabg and stents    DM (diabetes mellitus), type 2 with complications     History of gout     Hyperlipidemia associated with type 2 diabetes mellitus     Hypertension associated with diabetes     Obesity, unspecified     SOFIYA on CPAP     S/P CABG x 3     Type II diabetes mellitus with renal manifestations        Past Surgical History:   Procedure Laterality Date    CARDIAC CATHETERIZATION      CORONARY ANGIOPLASTY      CORONARY ARTERY BYPASS GRAFT         Social History   Substance Use Topics    Smoking status: Never Smoker    Smokeless tobacco: Never Used    Alcohol use Yes       Family History   Problem Relation Age of Onset    Diabetes  "Mother     Hypertension Father     Stroke Sister     Diabetes Sister     Heart disease Brother     Diabetes Brother     Hypertension Brother        Current Outpatient Prescriptions   Medication Sig    ACCU-CHEK MULTICLIX LANCET lancets TEST DAILY AS DIRECTED    ACCU-CHEK MULTICLIX LANCET lancets USE AS DIRECTED.    amlodipine (NORVASC) 10 MG tablet TAKE ONE TABLET BY MOUTH EVERY DAY    aspirin 81 MG Chew Take 81 mg by mouth once daily.    atorvastatin (LIPITOR) 80 MG tablet ONE BY MOUTH AT BEDTIME    fenofibrate micronized (LOFIBRA) 67 MG capsule Take 1 capsule (67 mg total) by mouth daily with breakfast.    furosemide (LASIX) 20 MG tablet Take 1 tablet (20 mg total) by mouth once daily.    gabapentin (NEURONTIN) 300 MG capsule ONE BY MOUTH EVERY TWELVE HOURS    isosorbide mononitrate (IMDUR) 30 MG 24 hr tablet Take 1 tablet (30 mg total) by mouth once daily.    metoprolol tartrate (LOPRESSOR) 100 MG tablet Take 1 tablet (100 mg total) by mouth 2 (two) times daily.    nitroGLYCERIN (NITROSTAT) 0.4 MG SL tablet Place 1 tablet (0.4 mg total) under the tongue every 5 (five) minutes as needed for Chest pain.    pen needle, diabetic (BD INSULIN PEN NEEDLE UF SHORT) 31 gauge x 5/16" Ndle AS DIRECTED    ranolazine (RANEXA) 500 MG Tb12 Take 1 tablet (500 mg total) by mouth 2 (two) times daily.    SOLIQUA 100/33 100 unit-33 mcg/mL InPn INJECT 30 UNITS EVERY DAY    ticagrelor (BRILINTA) 90 mg tablet Take 1 tablet (90 mg total) by mouth 2 (two) times daily.    valsartan-hydrochlorothiazide (DIOVAN-HCT) 320-25 mg per tablet TAKE ONE TABLET BY MOUTH EVERY DAY     No current facility-administered medications for this visit.      Current Outpatient Prescriptions on File Prior to Visit   Medication Sig    ACCU-CHEK MULTICLIX LANCET lancets TEST DAILY AS DIRECTED    ACCU-CHEK MULTICLIX LANCET lancets USE AS DIRECTED.    amlodipine (NORVASC) 10 MG tablet TAKE ONE TABLET BY MOUTH EVERY DAY    aspirin 81 MG " "Chew Take 81 mg by mouth once daily.    fenofibrate micronized (LOFIBRA) 67 MG capsule Take 1 capsule (67 mg total) by mouth daily with breakfast.    furosemide (LASIX) 20 MG tablet Take 1 tablet (20 mg total) by mouth once daily.    gabapentin (NEURONTIN) 300 MG capsule ONE BY MOUTH EVERY TWELVE HOURS    isosorbide mononitrate (IMDUR) 30 MG 24 hr tablet Take 1 tablet (30 mg total) by mouth once daily.    metoprolol tartrate (LOPRESSOR) 100 MG tablet Take 1 tablet (100 mg total) by mouth 2 (two) times daily.    nitroGLYCERIN (NITROSTAT) 0.4 MG SL tablet Place 1 tablet (0.4 mg total) under the tongue every 5 (five) minutes as needed for Chest pain.    pen needle, diabetic (BD INSULIN PEN NEEDLE UF SHORT) 31 gauge x 5/16" Ndle AS DIRECTED    valsartan-hydrochlorothiazide (DIOVAN-HCT) 320-25 mg per tablet TAKE ONE TABLET BY MOUTH EVERY DAY    [DISCONTINUED] BRILINTA 90 mg tablet TAKE ONE TABLET BY MOUTH TWICE DAILY    [DISCONTINUED] dulaglutide 1.5 mg/0.5 mL PnIj Inject 1.5 mg into the skin every 7 days.    [DISCONTINUED] insulin glargine (LANTUS SOLOSTAR) 100 unit/mL (3 mL) InPn pen Inject 40 Units into the skin every evening.    [DISCONTINUED] RANEXA 500 mg Tb12 TAKE ONE TABLET BY MOUTH TWICE DAILY     No current facility-administered medications on file prior to visit.        Review of Systems   Constitution: Negative for decreased appetite, weakness, malaise/fatigue, weight gain and weight loss.   HENT: Negative for nosebleeds.    Cardiovascular: Negative for chest pain, claudication, dyspnea on exertion, irregular heartbeat, leg swelling, near-syncope, orthopnea, palpitations, paroxysmal nocturnal dyspnea and syncope.   Respiratory: Negative for cough, shortness of breath, sleep disturbances due to breathing, snoring and wheezing.    Hematologic/Lymphatic: Negative for bleeding problem. Does not bruise/bleed easily.   Skin: Negative for rash.   Musculoskeletal: Positive for back pain and stiffness. " Negative for arthritis, falls, joint pain, joint swelling, muscle cramps, muscle weakness and myalgias.   Gastrointestinal: Negative for bloating, abdominal pain, constipation, diarrhea, heartburn, nausea and vomiting.   Genitourinary: Negative for dysuria, hematuria and nocturia.   Neurological: Negative for excessive daytime sleepiness, dizziness, light-headedness, loss of balance, numbness, paresthesias and vertigo.       Objective:   Physical Exam   Constitutional: He is oriented to person, place, and time. He appears well-developed and well-nourished.   Neck: Neck supple. No JVD present.   Cardiovascular: Normal rate, regular rhythm and normal pulses.   Occasional extrasystoles are present. Exam reveals no friction rub.    Murmur heard.  Pulmonary/Chest: Effort normal and breath sounds normal. No respiratory distress. He has no wheezes. He has no rales.   Sternal scar    Abdominal: Soft. Bowel sounds are normal. He exhibits no distension.   Musculoskeletal: He exhibits no edema or tenderness.   Neurological: He is alert and oriented to person, place, and time.   Skin: Skin is warm and dry. No rash noted.   Psychiatric: He has a normal mood and affect. His behavior is normal.   Nursing note and vitals reviewed.    Vitals:    08/22/17 1526   BP: 124/60   BP Location: Right arm   Patient Position: Sitting   BP Method: Large (Manual)   Pulse: 64   Weight: 108.5 kg (239 lb 3.2 oz)   Height: 6' (1.829 m)     Lab Results   Component Value Date    CHOL 153 02/14/2017    CHOL 178 09/13/2016    CHOL 145 06/03/2016     Lab Results   Component Value Date    HDL 33 (L) 02/14/2017    HDL 38 (L) 09/13/2016    HDL 29 (L) 06/03/2016     Lab Results   Component Value Date    LDLCALC 97.2 02/14/2017    LDLCALC 119.2 09/13/2016    LDLCALC 98.6 06/03/2016     Lab Results   Component Value Date    TRIG 114 02/14/2017    TRIG 104 09/13/2016    TRIG 87 06/03/2016     Lab Results   Component Value Date    CHOLHDL 21.6 02/14/2017     CHOLHDL 21.3 09/13/2016    CHOLHDL 20.0 06/03/2016       Chemistry        Component Value Date/Time     05/24/2017 0907    K 3.9 05/24/2017 0907     05/24/2017 0907    CO2 25 05/24/2017 0907    BUN 27 (H) 05/24/2017 0907    CREATININE 2.1 (H) 05/24/2017 0907     (H) 05/24/2017 0907        Component Value Date/Time    CALCIUM 10.6 (H) 05/24/2017 0907    ALKPHOS 44 (L) 09/13/2016 0944    AST 40 09/13/2016 0944    ALT 49 (H) 09/13/2016 0944    BILITOT 0.6 09/13/2016 0944    ESTGFRAFRICA 38.9 (A) 05/24/2017 0907    EGFRNONAA 33.7 (A) 05/24/2017 0907          Lab Results   Component Value Date    TSH 2.109 06/03/2016     Lab Results   Component Value Date    INR 1.0 05/15/2016     Lab Results   Component Value Date    WBC 5.54 05/24/2017    HGB 12.9 (L) 05/24/2017    HCT 37.3 (L) 05/24/2017    MCV 87 05/24/2017     05/24/2017     BMP  Sodium   Date Value Ref Range Status   05/24/2017 138 136 - 145 mmol/L Final     Potassium   Date Value Ref Range Status   05/24/2017 3.9 3.5 - 5.1 mmol/L Final     Chloride   Date Value Ref Range Status   05/24/2017 103 95 - 110 mmol/L Final     CO2   Date Value Ref Range Status   05/24/2017 25 23 - 29 mmol/L Final     BUN, Bld   Date Value Ref Range Status   05/24/2017 27 (H) 6 - 20 mg/dL Final     Creatinine   Date Value Ref Range Status   05/24/2017 2.1 (H) 0.5 - 1.4 mg/dL Final     Calcium   Date Value Ref Range Status   05/24/2017 10.6 (H) 8.7 - 10.5 mg/dL Final     Anion Gap   Date Value Ref Range Status   05/24/2017 10 8 - 16 mmol/L Final     eGFR if    Date Value Ref Range Status   05/24/2017 38.9 (A) >60 mL/min/1.73 m^2 Final     eGFR if non    Date Value Ref Range Status   05/24/2017 33.7 (A) >60 mL/min/1.73 m^2 Final     Comment:     Calculation used to obtain the estimated glomerular filtration  rate (eGFR) is the CKD-EPI equation. Since race is unknown   in our information system, the eGFR values for   -American  and Non--American patients are given   for each creatinine result.       CrCl cannot be calculated (Patient's most recent sCr result is older than the maximum 7 days allowed.).    CONCLUSIONS     1 - Moderately depressed left ventricular systolic function (EF 35-40%).     2 - Normal right ventricular systolic function .     3 - Moderate left ventricular enlargement.     4 - Moderate left atrial enlargement.     5 - Wall motion abnormalities.     6 - Concentric hypertrophy.     7 - The estimated PA systolic pressure is 28 mmHg.             This document has been electronically    SIGNED BY: Logan Morrissey MD On: 05/23/2017 12:03      Assessment:     1. Coronary artery disease involving native coronary artery of native heart without angina pectoris    2. Central sleep apnea due to Cheyne-Pérez respiration    3. SOFIYA on CPAP    4. Atherosclerosis of native coronary artery of native heart without angina pectoris    5. Hyperlipidemia associated with type 2 diabetes mellitus    6. Hypertension associated with diabetes    7. NSTEMI (non-ST elevated myocardial infarction)    8. Type 2 diabetes mellitus with diabetic nephropathy, with long-term current use of insulin    9. Hx of CABG      Patient has no angina or equivalent  BP stable  Using CPAP  No CNS complaints to suggest TIA or CVA  No symptoms to suggest decompensated HF  Diabetes well controlled   Good activity tolerance     Plan:   Continue current medical management for now  No changes needed at this time  Heart healthy diet  Maintain active lifestyle  RTC in 6 months with lipids, CMP and A1C with PCP

## 2017-08-25 DIAGNOSIS — E11.9 TYPE 2 DIABETES MELLITUS WITHOUT COMPLICATION: ICD-10-CM

## 2017-08-28 ENCOUNTER — TELEPHONE (OUTPATIENT)
Dept: CARDIOLOGY | Facility: CLINIC | Age: 57
End: 2017-08-28

## 2017-08-28 DIAGNOSIS — I25.10 CORONARY ARTERY DISEASE INVOLVING NATIVE CORONARY ARTERY OF NATIVE HEART WITHOUT ANGINA PECTORIS: Primary | ICD-10-CM

## 2017-09-20 RX ORDER — FUROSEMIDE 20 MG/1
TABLET ORAL
Qty: 30 TABLET | Refills: 12 | Status: SHIPPED | OUTPATIENT
Start: 2017-09-20 | End: 2018-09-26 | Stop reason: SDUPTHER

## 2017-10-05 RX ORDER — FENOFIBRATE 67 MG/1
CAPSULE ORAL
Qty: 30 CAPSULE | Refills: 10 | Status: SHIPPED | OUTPATIENT
Start: 2017-10-05 | End: 2018-08-09 | Stop reason: SDUPTHER

## 2017-10-06 ENCOUNTER — DOCUMENTATION ONLY (OUTPATIENT)
Dept: ADMINISTRATIVE | Facility: HOSPITAL | Age: 57
End: 2017-10-06

## 2017-10-07 ENCOUNTER — PATIENT MESSAGE (OUTPATIENT)
Dept: INTERNAL MEDICINE | Facility: CLINIC | Age: 57
End: 2017-10-07

## 2017-10-07 DIAGNOSIS — Z79.4 TYPE 2 DIABETES MELLITUS WITH DIABETIC NEPHROPATHY, WITH LONG-TERM CURRENT USE OF INSULIN: ICD-10-CM

## 2017-10-07 DIAGNOSIS — E11.21 TYPE 2 DIABETES MELLITUS WITH DIABETIC NEPHROPATHY, WITH LONG-TERM CURRENT USE OF INSULIN: ICD-10-CM

## 2017-10-07 DIAGNOSIS — Z12.5 PROSTATE CANCER SCREENING: ICD-10-CM

## 2017-10-07 DIAGNOSIS — E11.59 HYPERTENSION ASSOCIATED WITH DIABETES: ICD-10-CM

## 2017-10-07 DIAGNOSIS — I15.2 HYPERTENSION ASSOCIATED WITH DIABETES: ICD-10-CM

## 2017-10-07 DIAGNOSIS — E11.69 HYPERLIPIDEMIA ASSOCIATED WITH TYPE 2 DIABETES MELLITUS: ICD-10-CM

## 2017-10-07 DIAGNOSIS — E11.8 TYPE 2 DIABETES MELLITUS WITH COMPLICATION, WITHOUT LONG-TERM CURRENT USE OF INSULIN: Primary | ICD-10-CM

## 2017-10-07 DIAGNOSIS — E78.5 HYPERLIPIDEMIA ASSOCIATED WITH TYPE 2 DIABETES MELLITUS: ICD-10-CM

## 2017-10-09 ENCOUNTER — TELEPHONE (OUTPATIENT)
Dept: INTERNAL MEDICINE | Facility: CLINIC | Age: 57
End: 2017-10-09

## 2017-10-10 ENCOUNTER — LAB VISIT (OUTPATIENT)
Dept: LAB | Facility: HOSPITAL | Age: 57
End: 2017-10-10
Attending: INTERNAL MEDICINE
Payer: COMMERCIAL

## 2017-10-10 DIAGNOSIS — Z12.5 PROSTATE CANCER SCREENING: ICD-10-CM

## 2017-10-10 DIAGNOSIS — E11.8 TYPE 2 DIABETES MELLITUS WITH COMPLICATION, WITHOUT LONG-TERM CURRENT USE OF INSULIN: ICD-10-CM

## 2017-10-10 LAB
ALBUMIN SERPL BCP-MCNC: 3.8 G/DL
ALP SERPL-CCNC: 60 U/L
ALT SERPL W/O P-5'-P-CCNC: 37 U/L
ANION GAP SERPL CALC-SCNC: 9 MMOL/L
AST SERPL-CCNC: 34 U/L
BASOPHILS # BLD AUTO: 0.02 K/UL
BASOPHILS NFR BLD: 0.4 %
BILIRUB SERPL-MCNC: 0.5 MG/DL
BUN SERPL-MCNC: 32 MG/DL
CALCIUM SERPL-MCNC: 10 MG/DL
CHLORIDE SERPL-SCNC: 105 MMOL/L
CHOLEST SERPL-MCNC: 155 MG/DL
CHOLEST/HDLC SERPL: 4.3 {RATIO}
CO2 SERPL-SCNC: 26 MMOL/L
COMPLEXED PSA SERPL-MCNC: 0.7 NG/ML
CREAT SERPL-MCNC: 2.2 MG/DL
DIFFERENTIAL METHOD: ABNORMAL
EOSINOPHIL # BLD AUTO: 0.2 K/UL
EOSINOPHIL NFR BLD: 4.1 %
ERYTHROCYTE [DISTWIDTH] IN BLOOD BY AUTOMATED COUNT: 14.6 %
EST. GFR  (AFRICAN AMERICAN): 36.8 ML/MIN/1.73 M^2
EST. GFR  (NON AFRICAN AMERICAN): 31.8 ML/MIN/1.73 M^2
GLUCOSE SERPL-MCNC: 137 MG/DL
HCT VFR BLD AUTO: 35.5 %
HDLC SERPL-MCNC: 36 MG/DL
HDLC SERPL: 23.2 %
HGB BLD-MCNC: 12.6 G/DL
LDLC SERPL CALC-MCNC: 100.4 MG/DL
LYMPHOCYTES # BLD AUTO: 1.6 K/UL
LYMPHOCYTES NFR BLD: 31.8 %
MCH RBC QN AUTO: 29 PG
MCHC RBC AUTO-ENTMCNC: 35.5 G/DL
MCV RBC AUTO: 82 FL
MONOCYTES # BLD AUTO: 0.5 K/UL
MONOCYTES NFR BLD: 10.1 %
NEUTROPHILS # BLD AUTO: 2.6 K/UL
NEUTROPHILS NFR BLD: 53.4 %
NONHDLC SERPL-MCNC: 119 MG/DL
PLATELET # BLD AUTO: 218 K/UL
PMV BLD AUTO: 9.6 FL
POTASSIUM SERPL-SCNC: 3.8 MMOL/L
PROT SERPL-MCNC: 7.5 G/DL
RBC # BLD AUTO: 4.34 M/UL
SODIUM SERPL-SCNC: 140 MMOL/L
TRIGL SERPL-MCNC: 93 MG/DL
TSH SERPL DL<=0.005 MIU/L-ACNC: 4.62 UIU/ML
WBC # BLD AUTO: 4.87 K/UL

## 2017-10-10 PROCEDURE — 84153 ASSAY OF PSA TOTAL: CPT

## 2017-10-10 PROCEDURE — 80061 LIPID PANEL: CPT

## 2017-10-10 PROCEDURE — 84439 ASSAY OF FREE THYROXINE: CPT

## 2017-10-10 PROCEDURE — 84443 ASSAY THYROID STIM HORMONE: CPT

## 2017-10-10 PROCEDURE — 85025 COMPLETE CBC W/AUTO DIFF WBC: CPT

## 2017-10-10 PROCEDURE — 83036 HEMOGLOBIN GLYCOSYLATED A1C: CPT

## 2017-10-10 PROCEDURE — 80053 COMPREHEN METABOLIC PANEL: CPT

## 2017-10-10 PROCEDURE — 36415 COLL VENOUS BLD VENIPUNCTURE: CPT | Mod: PO

## 2017-10-11 LAB
ESTIMATED AVG GLUCOSE: 183 MG/DL
HBA1C MFR BLD HPLC: 8 %
T4 FREE SERPL-MCNC: 0.96 NG/DL

## 2017-10-12 ENCOUNTER — TELEPHONE (OUTPATIENT)
Dept: INTERNAL MEDICINE | Facility: CLINIC | Age: 57
End: 2017-10-12

## 2017-10-12 ENCOUNTER — OFFICE VISIT (OUTPATIENT)
Dept: INTERNAL MEDICINE | Facility: CLINIC | Age: 57
End: 2017-10-12
Payer: COMMERCIAL

## 2017-10-12 VITALS
OXYGEN SATURATION: 97 % | TEMPERATURE: 96 F | HEIGHT: 72 IN | WEIGHT: 244.69 LBS | HEART RATE: 67 BPM | SYSTOLIC BLOOD PRESSURE: 124 MMHG | DIASTOLIC BLOOD PRESSURE: 70 MMHG | BODY MASS INDEX: 33.14 KG/M2

## 2017-10-12 DIAGNOSIS — E11.8 TYPE 2 DIABETES MELLITUS WITH COMPLICATION, WITHOUT LONG-TERM CURRENT USE OF INSULIN: Primary | ICD-10-CM

## 2017-10-12 PROCEDURE — 99999 PR PBB SHADOW E&M-EST. PATIENT-LVL III: CPT | Mod: PBBFAC,,, | Performed by: INTERNAL MEDICINE

## 2017-10-12 PROCEDURE — 99396 PREV VISIT EST AGE 40-64: CPT | Mod: 25,S$GLB,, | Performed by: INTERNAL MEDICINE

## 2017-10-12 RX ORDER — LEVOTHYROXINE SODIUM 75 UG/1
75 TABLET ORAL DAILY
Qty: 30 TABLET | Refills: 11 | Status: SHIPPED | OUTPATIENT
Start: 2017-10-12 | End: 2019-08-13 | Stop reason: SDUPTHER

## 2017-10-12 NOTE — PROGRESS NOTES
HPI:  Patient is a 57-year-old man who comes today for follow-up his diabetes, hypertension, lipids and coronary disease.  He states his blood sugar in the morning are generally less than 130.  He was started on a new injectable agent approximately 6 weeks ago.  He does not recall what his hemoglobin A1c was prior to that.  He has no other complaints or problems at this time.  He denies any hypoglycemic events.  He denies any chest pains or shortness of breath.  He is now 3 months status post back surgery.  He is doing well.      Current MEDS: medcard review, verified and update  Allergies: Per the electronic medical record    Past Medical History:   Diagnosis Date    Acute coronary syndrome     Coronary atherosclerosis of unspecified type of vessel, native or graft     s/p cabg and stents    DM (diabetes mellitus), type 2 with complications     History of gout     Hyperlipidemia associated with type 2 diabetes mellitus     Hypertension associated with diabetes     Obesity, unspecified     SOFIYA on CPAP     S/P CABG x 3     Type II diabetes mellitus with renal manifestations        Past Surgical History:   Procedure Laterality Date    CARDIAC CATHETERIZATION      CORONARY ANGIOPLASTY      CORONARY ARTERY BYPASS GRAFT         SHx: per the electronic medical record    FHx: recorded in the electronic medical record    ROS:    denies any chest pains or shortness of breath. Denies any nausea, vomiting or diarrhea. Denies any fever, chills or sweats. Denies any change in weight, voice, stool, skin or hair. Denies any dysuria, dyspepsia or dysphagia. Denies any change in vision, hearing or headaches. Denies any swollen lymph nodes or loss of memory.    PE:  /70 (BP Location: Left arm)   Pulse 67   Temp 96.2 °F (35.7 °C) (Tympanic)   Ht 6' (1.829 m)   Wt 111 kg (244 lb 11.4 oz)   SpO2 97%   BMI 33.19 kg/m²   Gen: Well-developed, well-nourished, male, in no acute distress, oriented x3  HEENT: neck is  supple, no adenopathy, carotids 2+ equal without bruits, thyroid exam normal size without nodules.  CHEST: clear to auscultation and percussion  CVS: regular rate and rhythm without significant murmur, gallop, or rubs  ABD: soft, benign, no rebound no guarding, no distention.  Bowel sounds are normal.     nontender.  No palpable masses.  No organomegaly and no audible bruits.  RECTAL: no masses.  Prostate 30  Grams without nodules.  EXT: no clubbing, cyanosis, or edema  LYMPH: no cervical, inguinal, or axillary adenopathy  FEET: no loss of sensation.  No ulcers or pressure sores.  Monofilament testing normal  NEURO: gait normal.  Cranial nerves II- XII intact. No nystagmus.  Speech normal.   Gross motor and sensory unremarkable.    Lab Results   Component Value Date    WBC 4.87 10/10/2017    HGB 12.6 (L) 10/10/2017    HCT 35.5 (L) 10/10/2017     10/10/2017    CHOL 155 10/10/2017    TRIG 93 10/10/2017    HDL 36 (L) 10/10/2017    ALT 37 10/10/2017    AST 34 10/10/2017     10/10/2017    K 3.8 10/10/2017     10/10/2017    CREATININE 2.2 (H) 10/10/2017    BUN 32 (H) 10/10/2017    CO2 26 10/10/2017    TSH 4.623 (H) 10/10/2017    PSA 0.70 10/10/2017    INR 1.0 05/15/2016    HGBA1C 8.0 (H) 10/10/2017       Impression:  Diabetes, not to goal.  Needs to get the new agent more time  Hyperlipidemia, not to goal, but on maximal therapy  Hypertension, stable  Chronic kidney disease, unchanged  Coronary artery disease, asymptomatic  Hypothyroidism, new  Multiple other medical problems below, stable  Patient Active Problem List   Diagnosis    Coronary atherosclerosis    DM (diabetes mellitus), type 2 with complications    SOFIYA on CPAP    Type II diabetes mellitus with renal manifestations    Hypertension associated with diabetes    Hyperlipidemia associated with type 2 diabetes mellitus    History of gout    Lumbar radiculopathy    NSTEMI (non-ST elevated myocardial infarction)    Olecranon bone spur     Olecranon bursitis of right elbow    Coronary artery disease involving native coronary artery without angina pectoris    Central sleep apnea due to Cheyne-Pérez respiration    Periodic limb movement disorder (PLMD)    Restless legs syndrome (RLS)    Shift work sleep disorder    Poor sleep hygiene    Preop cardiovascular exam       Plan:   Orders Placed This Encounter    Hemoglobin A1c    Comprehensive metabolic panel    Lipid panel    levothyroxine (SYNTHROID) 75 MCG tablet    he was started on levothyroxine 75 µg daily.  He'll be seen again in 6 months with above lab work.  He'll follow-up with his endocrinologist in the next 1-2 months.  He was given flu vaccine today

## 2017-10-12 NOTE — TELEPHONE ENCOUNTER
----- Message from Aundrea Zavala sent at 10/12/2017 10:50 AM CDT -----  Contact: Patient   Patient request a call back at [phone], Regards to what was called in for him and the name of the medication from today's visit.    Thanks  td

## 2017-10-12 NOTE — TELEPHONE ENCOUNTER
Called pt.  He would like to know why was he prescribed Levothyroxine.  How does it work in treating the thyroid.  What does a low or high level TSH means.  Please advise.

## 2017-10-13 NOTE — TELEPHONE ENCOUNTER
I explained that too him at his visit. The thyroid gland regulates your metabolism and your gland is not putting out enough thyroid hormone which is why I put him on thyroid hormone replacement.

## 2017-10-13 NOTE — TELEPHONE ENCOUNTER
Spoke with patient. Educated on thyroid issues, Graves disease, and Flathead's disease. Patient is well informed of thyroid dysfunction.

## 2017-10-18 LAB — A1C: 8

## 2017-11-03 ENCOUNTER — DOCUMENTATION ONLY (OUTPATIENT)
Dept: ADMINISTRATIVE | Facility: HOSPITAL | Age: 57
End: 2017-11-03

## 2017-11-03 ENCOUNTER — TELEPHONE (OUTPATIENT)
Dept: INTERNAL MEDICINE | Facility: CLINIC | Age: 57
End: 2017-11-03

## 2017-11-20 RX ORDER — ISOSORBIDE MONONITRATE 30 MG/1
TABLET, EXTENDED RELEASE ORAL
Qty: 30 TABLET | Refills: 11 | Status: SHIPPED | OUTPATIENT
Start: 2017-11-20 | End: 2018-11-20 | Stop reason: SDUPTHER

## 2017-11-21 ENCOUNTER — LAB VISIT (OUTPATIENT)
Dept: LAB | Facility: HOSPITAL | Age: 57
End: 2017-11-21
Attending: INTERNAL MEDICINE
Payer: COMMERCIAL

## 2017-11-21 DIAGNOSIS — N18.30 CHRONIC KIDNEY DISEASE, STAGE III (MODERATE): ICD-10-CM

## 2017-11-21 LAB
ANION GAP SERPL CALC-SCNC: 7 MMOL/L
BASOPHILS # BLD AUTO: 0.03 K/UL
BASOPHILS NFR BLD: 0.6 %
BUN SERPL-MCNC: 26 MG/DL
CALCIUM SERPL-MCNC: 9.7 MG/DL
CHLORIDE SERPL-SCNC: 104 MMOL/L
CO2 SERPL-SCNC: 26 MMOL/L
CREAT SERPL-MCNC: 1.9 MG/DL
DIFFERENTIAL METHOD: ABNORMAL
EOSINOPHIL # BLD AUTO: 0.2 K/UL
EOSINOPHIL NFR BLD: 4.2 %
ERYTHROCYTE [DISTWIDTH] IN BLOOD BY AUTOMATED COUNT: 14 %
EST. GFR  (AFRICAN AMERICAN): 43.9 ML/MIN/1.73 M^2
EST. GFR  (NON AFRICAN AMERICAN): 38 ML/MIN/1.73 M^2
GLUCOSE SERPL-MCNC: 300 MG/DL
HCT VFR BLD AUTO: 34 %
HGB BLD-MCNC: 11.7 G/DL
IMM GRANULOCYTES # BLD AUTO: 0.02 K/UL
IMM GRANULOCYTES NFR BLD AUTO: 0.4 %
LYMPHOCYTES # BLD AUTO: 1.2 K/UL
LYMPHOCYTES NFR BLD: 22.6 %
MCH RBC QN AUTO: 28.8 PG
MCHC RBC AUTO-ENTMCNC: 34.4 G/DL
MCV RBC AUTO: 84 FL
MONOCYTES # BLD AUTO: 0.6 K/UL
MONOCYTES NFR BLD: 10.9 %
NEUTROPHILS # BLD AUTO: 3.2 K/UL
NEUTROPHILS NFR BLD: 61.3 %
NRBC BLD-RTO: 0 /100 WBC
PLATELET # BLD AUTO: 223 K/UL
PMV BLD AUTO: 9.9 FL
POTASSIUM SERPL-SCNC: 4 MMOL/L
PTH-INTACT SERPL-MCNC: 69 PG/ML
RBC # BLD AUTO: 4.06 M/UL
SODIUM SERPL-SCNC: 137 MMOL/L
WBC # BLD AUTO: 5.21 K/UL

## 2017-11-21 PROCEDURE — 85025 COMPLETE CBC W/AUTO DIFF WBC: CPT

## 2017-11-21 PROCEDURE — 36415 COLL VENOUS BLD VENIPUNCTURE: CPT

## 2017-11-21 PROCEDURE — 80048 BASIC METABOLIC PNL TOTAL CA: CPT

## 2017-11-21 PROCEDURE — 83970 ASSAY OF PARATHORMONE: CPT

## 2017-11-27 ENCOUNTER — OFFICE VISIT (OUTPATIENT)
Dept: NEPHROLOGY | Facility: CLINIC | Age: 57
End: 2017-11-27
Payer: COMMERCIAL

## 2017-11-27 VITALS
SYSTOLIC BLOOD PRESSURE: 134 MMHG | DIASTOLIC BLOOD PRESSURE: 78 MMHG | HEIGHT: 72 IN | BODY MASS INDEX: 33.08 KG/M2 | WEIGHT: 244.25 LBS | HEART RATE: 68 BPM

## 2017-11-27 DIAGNOSIS — Z71.89 ENCOUNTER FOR MEDICATION REVIEW AND COUNSELING: ICD-10-CM

## 2017-11-27 DIAGNOSIS — I10 ESSENTIAL HYPERTENSION: ICD-10-CM

## 2017-11-27 DIAGNOSIS — E11.21 DIABETIC NEPHROPATHY ASSOCIATED WITH TYPE 2 DIABETES MELLITUS: ICD-10-CM

## 2017-11-27 DIAGNOSIS — E11.29 MICROALBUMINURIA DUE TO TYPE 2 DIABETES MELLITUS: ICD-10-CM

## 2017-11-27 DIAGNOSIS — R80.9 MICROALBUMINURIA DUE TO TYPE 2 DIABETES MELLITUS: ICD-10-CM

## 2017-11-27 DIAGNOSIS — N25.81 SECONDARY HYPERPARATHYROIDISM: ICD-10-CM

## 2017-11-27 DIAGNOSIS — N18.30 CHRONIC KIDNEY DISEASE, STAGE III (MODERATE): Primary | ICD-10-CM

## 2017-11-27 PROCEDURE — 99999 PR PBB SHADOW E&M-EST. PATIENT-LVL III: CPT | Mod: PBBFAC,,, | Performed by: INTERNAL MEDICINE

## 2017-11-27 PROCEDURE — 99214 OFFICE O/P EST MOD 30 MIN: CPT | Mod: S$GLB,,, | Performed by: INTERNAL MEDICINE

## 2017-11-27 NOTE — PROGRESS NOTES
NEPHROLOGY CLINIC FOLLOWUP NOTE:  Date of clinic visit: 11/27/17     REASON FOR FOLLOWUP AND CHIEF COMPLAINT:  History of chronic kidney disease.       ORTHOPEDIC SURGEON:  Mendoza Wray MD     HISTORY OF PRESENT ILLNESS:  Mr. Cox is a 57-year-old  male with the history of diabetes mellitus, hypertension and CKD stage III, who presents for followup. He was last seen by us about 6 months ago.  He says he is feeling well today.  No acute issues, no new problems, no chest pain, no shortness of breath, no leg swelling.  He has been taking his medications as prescribed and there are no new or active issues today. Life style was discussed with pt. He remains physically active and avoids unhealthy foods. Diet was discussed. His wife is present in the clinic today     He is s/p spine surgery for a herniated disk since his last visit with us. He says the procedure went well and his sciatic nerve sx's have improved.      PAST MEDICAL HISTORY:  1.  CKD stage III, estimated GFR about 40 mL/min, baseline creatinine is close   to 2 mg/dL.  2.  Hypertension.  3.  Diabetes mellitus for at least 10 years.  4.  History of coronary artery disease with past history of MI at the age of   19/premature heart disease and history of CABG in 2001.  5.  Diastolic congestive heart failure.  6.  Obstructive sleep apnea, on CPAP.  7.  Gout.  8.  Restless leg syndrome.  9.  Hyperlipidemia.  10.  Bursitis of the right elbow.     PAST SURGICAL HISTORY:  Reviewed as above unchanged.     FAMILY HISTORY:  Reviewed and unchanged.  Mother passed away at the age of 52   from pancreatic cancer.  Multiple family members with diabetes mellitus.  Father   is alive and well in his 70s.     ALLERGIES:  Reviewed.  No known drug allergies.     SOCIAL HISTORY:  Negative for smoking.  No alcohol use.     MEDICATIONS:  Reviewed.  Aspirin 81 mg daily, amlodipine 10 mg daily,   fenofibrate 67 mg daily, Lasix 20 mg daily, Neurontin 300 mg b.i.d.,  insulin,   metoprolol 100 mg b.i.d., Ranexa 500 mg b.i.d., valsartan/HCTZ 320/25 one p.o.   daily, Brilinta 90 mg b.i.d.  1.  Sinus already.     REVIEW OF SYSTEMS:  No recent hospitalizations.  GENERAL:  Negative.  HEAD, EYES, EARS, NOSE AND THROAT:  Negative.  CARDIAC:  Negative.  PULMONARY:  Negative.  GASTROINTESTINAL:  Negative.  GENITOURINARY:  Negative.  PSYCHOLOGICAL:  Negative.  NEUROLOGICAL:  Negative.  ENDOCRINE:  As above, otherwise negative.  HEMATOLOGIC AND ONCOLOGIC:  Negative.  INFECTIOUS DISEASE:  Negative.  The rest of the review of systems negative.     PHYSICAL EXAMINATION:  VITAL SIGNS:  Blood pressure is 137/78, pulse is 68, weight is 244 lbs, last visit 241 pounds   GENERAL:  He is cooperative, pleasant, atraumatic, normocephalic, well   developed, well nourished.  Speech and thought process appropriate, normal.  HEENT:  Mucous membranes moist.  NECK:  No JVD.  HEART:  Regular rate and rhythm, S1 and S2 audible.  No rubs.  CHEST:  Clear to auscultation.  No rales.  No wheezes.  Breathing symmetric and   unlabored.  EXTREMITIES:  No edema.     LABORATORY:  Reviewed.   BMP  Lab Results   Component Value Date     11/21/2017    K 4.0 11/21/2017     11/21/2017    CO2 26 11/21/2017    BUN 26 (H) 11/21/2017    CREATININE 1.9 (H) 11/21/2017    CALCIUM 9.7 11/21/2017    ANIONGAP 7 (L) 11/21/2017    ESTGFRAFRICA 43.9 (A) 11/21/2017    EGFRNONAA 38.0 (A) 11/21/2017     Lab Results   Component Value Date    WBC 5.21 11/21/2017    HGB 11.7 (L) 11/21/2017    HCT 34.0 (L) 11/21/2017    MCV 84 11/21/2017     11/21/2017     Lab Results   Component Value Date    PTH 69.0 11/21/2017    CALCIUM 9.7 11/21/2017     Urine protein//Cr close to zero  PSA was 0.6.     ASSESSMENT AND PLAN:  This is a 57-year-old man with CKD stage III, who presents for followup.  The impression is as follows:    1.  Renal.  Renal function is stable.  Creatinine has not changed or worsened since last visit  CKD stage  3  Discussed with pt and his wife    2. HTN: BP well controlled.  Meds reviewed  No changes in BP meds today.    3.  Electrolytes were reviewed.    Potassium is normal  Acid base that is stable.    Mild hypercalcemia is the past, s Ca currently normal  Likely due to hydrochlorothiazide.   At this point because he is also on Lasix, thus the latter medicine will keep the calcium in check.    Will continue both diuretics.    4.  Cardiac. H/o of diastolic CHF  He,modynamically stable, well compensated  Continue diuretics, Lasix and HCTZ.      5.  H/o of diabetes mellitus   Has very low or no significant proteinuria  Mild/icroalbuminuria likely consistent with diabetic nephropathy.    Hemoglobin A1c has improved overall.      6. Diet and lifestyle modifications discussed with the patient.  Advised pt to avoid high sugar (even artificial sweeteners) foods and drinks.    7.  Secondary hyperparathyroidism with the intact PTH of 85 in the past.   Repeat iPTh lower and in normal range  No indications for active vit D therapy at this point  Will monitor      PLANS AND RECOMMENDATIONS:    As discussed above.    No medication changes.   Return to see us in about 6 months    Total time spent 25 minutes, more than 50% of the time was spent on counseling   coordination of care.     Bigg Strauss MD

## 2018-01-02 RX ORDER — AMLODIPINE BESYLATE 10 MG/1
TABLET ORAL
Qty: 90 TABLET | Refills: 3 | Status: SHIPPED | OUTPATIENT
Start: 2018-01-02 | End: 2018-05-14 | Stop reason: SDUPTHER

## 2018-01-02 RX ORDER — NITROGLYCERIN 0.4 MG/1
TABLET SUBLINGUAL
Qty: 25 TABLET | Refills: 11 | Status: SHIPPED | OUTPATIENT
Start: 2018-01-02 | End: 2018-05-03 | Stop reason: SDUPTHER

## 2018-01-11 RX ORDER — COLCHICINE 0.6 MG/1
TABLET, FILM COATED ORAL
Qty: 90 TABLET | Refills: 2 | Status: SHIPPED | OUTPATIENT
Start: 2018-01-11 | End: 2018-05-14

## 2018-01-22 RX ORDER — PEN NEEDLE, DIABETIC 31 GX3/16"
NEEDLE, DISPOSABLE MISCELLANEOUS
Qty: 100 EACH | Refills: 3 | Status: SHIPPED | OUTPATIENT
Start: 2018-01-22 | End: 2019-04-27 | Stop reason: SDUPTHER

## 2018-02-19 RX ORDER — METOPROLOL TARTRATE 100 MG/1
TABLET ORAL
Qty: 180 TABLET | Refills: 2 | Status: SHIPPED | OUTPATIENT
Start: 2018-02-19 | End: 2018-11-20 | Stop reason: SDUPTHER

## 2018-03-08 ENCOUNTER — TELEPHONE (OUTPATIENT)
Dept: INTERNAL MEDICINE | Facility: CLINIC | Age: 58
End: 2018-03-08

## 2018-03-08 RX ORDER — TIZANIDINE 4 MG/1
4 TABLET ORAL EVERY 6 HOURS PRN
Qty: 50 TABLET | Refills: 1 | Status: SHIPPED | OUTPATIENT
Start: 2018-03-08 | End: 2018-06-07 | Stop reason: SDUPTHER

## 2018-03-08 NOTE — TELEPHONE ENCOUNTER
Spoke with patient appointment has been scheduled. He wants to know if appointment is necessary or can he be given something like a muscle relaxer. Please advise.

## 2018-03-08 NOTE — TELEPHONE ENCOUNTER
----- Message from Alexander Nicole sent at 3/8/2018 11:37 AM CST -----  Contact: pt  States he's calling rg wanting to be worked in to see Dr for neck spams and can be reached at [phone]//[email or web address]

## 2018-03-09 ENCOUNTER — OFFICE VISIT (OUTPATIENT)
Dept: INTERNAL MEDICINE | Facility: CLINIC | Age: 58
End: 2018-03-09
Payer: COMMERCIAL

## 2018-03-09 VITALS
OXYGEN SATURATION: 98 % | HEIGHT: 72 IN | DIASTOLIC BLOOD PRESSURE: 70 MMHG | RESPIRATION RATE: 16 BRPM | BODY MASS INDEX: 33.18 KG/M2 | TEMPERATURE: 99 F | HEART RATE: 72 BPM | SYSTOLIC BLOOD PRESSURE: 110 MMHG | WEIGHT: 244.94 LBS

## 2018-03-09 DIAGNOSIS — M54.2 CERVICAL PAIN (NECK): Primary | ICD-10-CM

## 2018-03-09 PROCEDURE — 99213 OFFICE O/P EST LOW 20 MIN: CPT | Mod: S$GLB,,, | Performed by: INTERNAL MEDICINE

## 2018-03-09 PROCEDURE — 3074F SYST BP LT 130 MM HG: CPT | Mod: S$GLB,,, | Performed by: INTERNAL MEDICINE

## 2018-03-09 PROCEDURE — 3078F DIAST BP <80 MM HG: CPT | Mod: S$GLB,,, | Performed by: INTERNAL MEDICINE

## 2018-03-09 PROCEDURE — 99999 PR PBB SHADOW E&M-EST. PATIENT-LVL IV: CPT | Mod: PBBFAC,,, | Performed by: INTERNAL MEDICINE

## 2018-03-09 NOTE — PROGRESS NOTES
"HPI:  Patient is a 58-year-old man who comes in today with complaints of cervical neck spasms on the left side for the last several days.  He denies any radiation of pain and down into his arms.  He's had problems intermittently with it in the past.  He denies any recent trauma or injury.    Current meds have been verified and updated per the EMR  Exam:/70   Pulse 72   Temp 98.5 °F (36.9 °C)   Resp 16   Ht 5' 11.5" (1.816 m)   Wt 111.1 kg (244 lb 14.9 oz)   SpO2 98%   BMI 33.68 kg/m²   He has full range of motion cervical spine.  He shows no impingement sign.    Lab Results   Component Value Date    WBC 5.21 11/21/2017    HGB 11.7 (L) 11/21/2017    HCT 34.0 (L) 11/21/2017     11/21/2017    CHOL 155 10/10/2017    TRIG 93 10/10/2017    HDL 36 (L) 10/10/2017    ALT 37 10/10/2017    AST 34 10/10/2017     11/21/2017    K 4.0 11/21/2017     11/21/2017    CREATININE 1.9 (H) 11/21/2017    BUN 26 (H) 11/21/2017    CO2 26 11/21/2017    TSH 4.623 (H) 10/10/2017    PSA 0.70 10/10/2017    INR 1.0 05/15/2016    HGBA1C 8.0 (H) 10/10/2017       Impression:  Muscular cervical neck spasm  Patient Active Problem List   Diagnosis    Coronary atherosclerosis    DM (diabetes mellitus), type 2 with complications    SOFIYA on CPAP    Type II diabetes mellitus with renal manifestations    Hypertension associated with diabetes    Hyperlipidemia associated with type 2 diabetes mellitus    History of gout    Lumbar radiculopathy    NSTEMI (non-ST elevated myocardial infarction)    Olecranon bone spur    Olecranon bursitis of right elbow    Coronary artery disease involving native coronary artery without angina pectoris    Central sleep apnea due to Cheyne-Pérez respiration    Periodic limb movement disorder (PLMD)    Restless legs syndrome (RLS)    Shift work sleep disorder    Poor sleep hygiene       Plan:     He will use Zanaflex as needed.  He should not take any anti-inflammatories due to his " kidney disease.

## 2018-03-20 ENCOUNTER — OFFICE VISIT (OUTPATIENT)
Dept: CARDIOLOGY | Facility: CLINIC | Age: 58
End: 2018-03-20
Payer: COMMERCIAL

## 2018-03-20 VITALS
DIASTOLIC BLOOD PRESSURE: 72 MMHG | WEIGHT: 245.38 LBS | SYSTOLIC BLOOD PRESSURE: 108 MMHG | HEART RATE: 64 BPM | BODY MASS INDEX: 33.23 KG/M2 | HEIGHT: 72 IN

## 2018-03-20 DIAGNOSIS — I15.2 HYPERTENSION ASSOCIATED WITH DIABETES: ICD-10-CM

## 2018-03-20 DIAGNOSIS — G47.33 OSA ON CPAP: ICD-10-CM

## 2018-03-20 DIAGNOSIS — I25.10 ATHEROSCLEROSIS OF NATIVE CORONARY ARTERY OF NATIVE HEART WITHOUT ANGINA PECTORIS: ICD-10-CM

## 2018-03-20 DIAGNOSIS — E11.8 TYPE 2 DIABETES MELLITUS WITH COMPLICATION, WITHOUT LONG-TERM CURRENT USE OF INSULIN: ICD-10-CM

## 2018-03-20 DIAGNOSIS — E11.59 HYPERTENSION ASSOCIATED WITH DIABETES: ICD-10-CM

## 2018-03-20 DIAGNOSIS — I25.10 CORONARY ARTERY DISEASE INVOLVING NATIVE CORONARY ARTERY OF NATIVE HEART WITHOUT ANGINA PECTORIS: Primary | ICD-10-CM

## 2018-03-20 DIAGNOSIS — E78.5 HYPERLIPIDEMIA ASSOCIATED WITH TYPE 2 DIABETES MELLITUS: ICD-10-CM

## 2018-03-20 DIAGNOSIS — E11.69 HYPERLIPIDEMIA ASSOCIATED WITH TYPE 2 DIABETES MELLITUS: ICD-10-CM

## 2018-03-20 PROCEDURE — 99999 PR PBB SHADOW E&M-EST. PATIENT-LVL III: CPT | Mod: PBBFAC,,, | Performed by: NURSE PRACTITIONER

## 2018-03-20 PROCEDURE — 3045F PR MOST RECENT HEMOGLOBIN A1C LEVEL 7.0-9.0%: CPT | Mod: CPTII,S$GLB,, | Performed by: NURSE PRACTITIONER

## 2018-03-20 PROCEDURE — 3078F DIAST BP <80 MM HG: CPT | Mod: CPTII,S$GLB,, | Performed by: NURSE PRACTITIONER

## 2018-03-20 PROCEDURE — 3074F SYST BP LT 130 MM HG: CPT | Mod: CPTII,S$GLB,, | Performed by: NURSE PRACTITIONER

## 2018-03-20 PROCEDURE — 99214 OFFICE O/P EST MOD 30 MIN: CPT | Mod: S$GLB,,, | Performed by: NURSE PRACTITIONER

## 2018-03-20 NOTE — PROGRESS NOTES
Subjective:   Patient ID:  Christiano Cox is a 58 y.o. male who presents for follow up of Coronary Artery Disease      HPI     Patient presents to clinic for follow up and management of CAD.  He has PMH CAD, NSTEMI, sleep apnea in which he uses CPAP nightly, DM, HLP, HTN, CABG x3. He had 2D echo that shows decline in EF from 45% in 2016 to 35% in May 2017.  Patient underwent angiogram on 5/16/16 for NSTEMI that showed occluded rca, lad and lcx, occluded svg to pda, severe proximal stenosis of the svg to om treated with resolute stent and filter wire protection. Also noted to have patent lima to lad and Normal filling pressure. Successful PCI with GRAHAM to SVG to OM1. Uses CPAP nightly. Denies any chest pain, SOB, orthopnea, PND, edema, dizziness, near syncope or syncope. Following closely by Nephrology. States that he uses SL nitro approximately twice monthly. Does well with limiting sodium intake. Rides his exercise bike 3 times a week. Has no angina with exercise.       Past Medical History:   Diagnosis Date    Acute coronary syndrome     Coronary atherosclerosis of unspecified type of vessel, native or graft     s/p cabg and stents    DM (diabetes mellitus), type 2 with complications     History of gout     Hyperlipidemia associated with type 2 diabetes mellitus     Hypertension associated with diabetes     Obesity, unspecified     SOFIYA on CPAP     S/P CABG x 3     Type II diabetes mellitus with renal manifestations        Past Surgical History:   Procedure Laterality Date    CARDIAC CATHETERIZATION      CORONARY ANGIOPLASTY      CORONARY ARTERY BYPASS GRAFT         Social History   Substance Use Topics    Smoking status: Never Smoker    Smokeless tobacco: Never Used    Alcohol use Yes       Family History   Problem Relation Age of Onset    Diabetes Mother     Hypertension Father     Stroke Sister     Diabetes Sister     Heart disease Brother     Diabetes Brother     Hypertension Brother   "      Current Outpatient Prescriptions   Medication Sig    ACCU-CHEK MULTICLIX LANCET lancets TEST DAILY AS DIRECTED    ACCU-CHEK MULTICLIX LANCET lancets USE AS DIRECTED.    amLODIPine (NORVASC) 10 MG tablet TAKE ONE TABLET BY MOUTH DAILY    aspirin 81 MG Chew Take 81 mg by mouth once daily.    atorvastatin (LIPITOR) 80 MG tablet ONE BY MOUTH AT BEDTIME    fenofibrate micronized (LOFIBRA) 67 MG capsule TAKE ONE CAPSULE BY MOUTH DAILY WITH breakfast.    furosemide (LASIX) 20 MG tablet TAKE ONE TABLET BY MOUTH DAILY    isosorbide mononitrate (IMDUR) 30 MG 24 hr tablet TAKE ONE TABLET BY MOUTH EVERY DAY    levothyroxine (SYNTHROID) 75 MCG tablet Take 1 tablet (75 mcg total) by mouth once daily.    metoprolol tartrate (LOPRESSOR) 100 MG tablet TAKE ONE TABLET BY MOUTH TWICE DAILY    nitroGLYCERIN (NITROSTAT) 0.4 MG SL tablet PLACE ONE TABLET UNDER THE TONGUE EVERY FIVE MINUTES AS NEEDED FOR CHEST PAIN    ranolazine (RANEXA) 500 MG Tb12 Take 1 tablet (500 mg total) by mouth 2 (two) times daily.    SURE COMFORT PEN NEEDLE 31 gauge x 5/16" Ndle AS DIRECTED    ticagrelor (BRILINTA) 90 mg tablet Take 1 tablet (90 mg total) by mouth 2 (two) times daily.    valsartan-hydrochlorothiazide (DIOVAN-HCT) 320-25 mg per tablet TAKE ONE TABLET BY MOUTH EVERY DAY    COLCRYS 0.6 mg tablet One with onset of attack and then every hour until resolved, up to 5 tablets     No current facility-administered medications for this visit.      Current Outpatient Prescriptions on File Prior to Visit   Medication Sig    ACCU-CHEK MULTICLIX LANCET lancets TEST DAILY AS DIRECTED    ACCU-CHEK MULTICLIX LANCET lancets USE AS DIRECTED.    amLODIPine (NORVASC) 10 MG tablet TAKE ONE TABLET BY MOUTH DAILY    aspirin 81 MG Chew Take 81 mg by mouth once daily.    atorvastatin (LIPITOR) 80 MG tablet ONE BY MOUTH AT BEDTIME    fenofibrate micronized (LOFIBRA) 67 MG capsule TAKE ONE CAPSULE BY MOUTH DAILY WITH breakfast.    furosemide " "(LASIX) 20 MG tablet TAKE ONE TABLET BY MOUTH DAILY    isosorbide mononitrate (IMDUR) 30 MG 24 hr tablet TAKE ONE TABLET BY MOUTH EVERY DAY    levothyroxine (SYNTHROID) 75 MCG tablet Take 1 tablet (75 mcg total) by mouth once daily.    metoprolol tartrate (LOPRESSOR) 100 MG tablet TAKE ONE TABLET BY MOUTH TWICE DAILY    nitroGLYCERIN (NITROSTAT) 0.4 MG SL tablet PLACE ONE TABLET UNDER THE TONGUE EVERY FIVE MINUTES AS NEEDED FOR CHEST PAIN    ranolazine (RANEXA) 500 MG Tb12 Take 1 tablet (500 mg total) by mouth 2 (two) times daily.    SURE COMFORT PEN NEEDLE 31 gauge x 5/16" Ndle AS DIRECTED    ticagrelor (BRILINTA) 90 mg tablet Take 1 tablet (90 mg total) by mouth 2 (two) times daily.    valsartan-hydrochlorothiazide (DIOVAN-HCT) 320-25 mg per tablet TAKE ONE TABLET BY MOUTH EVERY DAY    COLCRYS 0.6 mg tablet One with onset of attack and then every hour until resolved, up to 5 tablets    [DISCONTINUED] SOLIQUA 100/33 100 unit-33 mcg/mL InPn INJECT 30 UNITS EVERY DAY     No current facility-administered medications on file prior to visit.        Review of Systems   Constitution: Negative for diaphoresis, weakness, malaise/fatigue, weight gain and weight loss.   HENT: Negative for congestion and nosebleeds.    Cardiovascular: Negative for chest pain, claudication, cyanosis, dyspnea on exertion, irregular heartbeat, leg swelling, near-syncope, orthopnea, palpitations, paroxysmal nocturnal dyspnea and syncope.   Respiratory: Negative for cough, hemoptysis, shortness of breath, sleep disturbances due to breathing, snoring, sputum production and wheezing.    Hematologic/Lymphatic: Negative for bleeding problem. Does not bruise/bleed easily.   Skin: Negative for rash.   Musculoskeletal: Negative for arthritis, back pain, falls, joint pain, muscle cramps and muscle weakness.   Gastrointestinal: Negative for abdominal pain, constipation, diarrhea, heartburn, hematemesis, hematochezia, melena and nausea. " "  Genitourinary: Negative for dysuria, hematuria and nocturia.   Neurological: Negative for excessive daytime sleepiness, dizziness, headaches, light-headedness, loss of balance, numbness and vertigo.       Objective:   Physical Exam   Constitutional: He is oriented to person, place, and time. He appears well-developed and well-nourished.   Neck: Neck supple. No JVD present.   Cardiovascular: Normal rate, regular rhythm and normal pulses.   Occasional extrasystoles are present. Exam reveals no friction rub.    Murmur heard.  Pulmonary/Chest: Effort normal and breath sounds normal. No respiratory distress. He has no wheezes. He has no rales.   Sternal scar    Abdominal: Soft. Bowel sounds are normal. He exhibits no distension.   Musculoskeletal: He exhibits no edema or tenderness.   Neurological: He is alert and oriented to person, place, and time.   Skin: Skin is warm and dry. No rash noted.   Psychiatric: He has a normal mood and affect. His behavior is normal.   Nursing note and vitals reviewed.    Vitals:    03/20/18 0820 03/20/18 0821   BP: 104/72 108/72   BP Location: Left arm Right arm   Pulse: 64    Weight: 111.3 kg (245 lb 6 oz)    Height: 5' 11.5" (1.816 m)      Lab Results   Component Value Date    CHOL 155 10/10/2017    CHOL 161 06/05/2017    CHOL 153 02/14/2017     Lab Results   Component Value Date    HDL 36 (L) 10/10/2017    HDL 36 06/05/2017    HDL 33 (L) 02/14/2017     Lab Results   Component Value Date    LDLCALC 100.4 10/10/2017    LDLCALC 103 06/05/2017    LDLCALC 97.2 02/14/2017     Lab Results   Component Value Date    TRIG 93 10/10/2017    TRIG 108 06/05/2017    TRIG 114 02/14/2017     Lab Results   Component Value Date    CHOLHDL 23.2 10/10/2017    CHOLHDL 21.6 02/14/2017    CHOLHDL 21.3 09/13/2016       Chemistry        Component Value Date/Time     11/21/2017 0942    K 4.0 11/21/2017 0942     11/21/2017 0942    CO2 26 11/21/2017 0942    BUN 26 (H) 11/21/2017 0942    CREATININE " 1.9 (H) 11/21/2017 0942     (H) 11/21/2017 0942        Component Value Date/Time    CALCIUM 9.7 11/21/2017 0942    ALKPHOS 60 10/10/2017 0837    AST 34 10/10/2017 0837    ALT 37 10/10/2017 0837    BILITOT 0.5 10/10/2017 0837    ESTGFRAFRICA 43.9 (A) 11/21/2017 0942    EGFRNONAA 38.0 (A) 11/21/2017 0942          Lab Results   Component Value Date    TSH 4.623 (H) 10/10/2017     Lab Results   Component Value Date    INR 1.0 05/15/2016     Lab Results   Component Value Date    WBC 5.21 11/21/2017    HGB 11.7 (L) 11/21/2017    HCT 34.0 (L) 11/21/2017    MCV 84 11/21/2017     11/21/2017     BMP  Sodium   Date Value Ref Range Status   11/21/2017 137 136 - 145 mmol/L Final     Potassium   Date Value Ref Range Status   11/21/2017 4.0 3.5 - 5.1 mmol/L Final     Chloride   Date Value Ref Range Status   11/21/2017 104 95 - 110 mmol/L Final     CO2   Date Value Ref Range Status   11/21/2017 26 23 - 29 mmol/L Final     BUN, Bld   Date Value Ref Range Status   11/21/2017 26 (H) 6 - 20 mg/dL Final     Creatinine   Date Value Ref Range Status   11/21/2017 1.9 (H) 0.5 - 1.4 mg/dL Final     Calcium   Date Value Ref Range Status   11/21/2017 9.7 8.7 - 10.5 mg/dL Final     Anion Gap   Date Value Ref Range Status   11/21/2017 7 (L) 8 - 16 mmol/L Final     eGFR if    Date Value Ref Range Status   11/21/2017 43.9 (A) >60 mL/min/1.73 m^2 Final     eGFR if non    Date Value Ref Range Status   11/21/2017 38.0 (A) >60 mL/min/1.73 m^2 Final     Comment:     Calculation used to obtain the estimated glomerular filtration  rate (eGFR) is the CKD-EPI equation.        CrCl cannot be calculated (Patient's most recent lab result is older than the maximum 7 days allowed.).    Assessment:     1. Coronary artery disease involving native coronary artery of native heart without angina pectoris    2. Atherosclerosis of native coronary artery of native heart without angina pectoris    3. Hypertension associated  with diabetes    4. Hyperlipidemia associated with type 2 diabetes mellitus    5. Type 2 diabetes mellitus with complication, without long-term current use of insulin    6. SOFIYA on CPAP      Patient has no angina or equivalent.  BP stable  Using CPAP  No CNS complaints to suggest TIA or CVA  No symptoms to suggest decompensated HF  Diabetes well controlled   Good activity tolerance     Plan:   Continue current medical management for now  No changes needed at this time  Heart healthy diet  Maintain active lifestyle  RTC in 6 months with lipids, CMP and A1C with PCP

## 2018-03-29 ENCOUNTER — PATIENT OUTREACH (OUTPATIENT)
Dept: ADMINISTRATIVE | Facility: HOSPITAL | Age: 58
End: 2018-03-29

## 2018-04-10 RX ORDER — VALSARTAN AND HYDROCHLOROTHIAZIDE 320; 25 MG/1; MG/1
TABLET, FILM COATED ORAL
Qty: 90 TABLET | Refills: 3 | Status: SHIPPED | OUTPATIENT
Start: 2018-04-10 | End: 2018-07-25

## 2018-05-03 RX ORDER — NITROGLYCERIN 0.4 MG/1
TABLET SUBLINGUAL
Qty: 25 TABLET | Refills: 11 | Status: SHIPPED | OUTPATIENT
Start: 2018-05-03 | End: 2018-05-07 | Stop reason: SDUPTHER

## 2018-05-07 ENCOUNTER — TELEPHONE (OUTPATIENT)
Dept: NEPHROLOGY | Facility: CLINIC | Age: 58
End: 2018-05-07

## 2018-05-07 RX ORDER — NITROGLYCERIN 0.4 MG/1
TABLET SUBLINGUAL
Qty: 25 TABLET | Refills: 11 | Status: SHIPPED | OUTPATIENT
Start: 2018-05-07 | End: 2018-08-28 | Stop reason: SDUPTHER

## 2018-05-07 NOTE — TELEPHONE ENCOUNTER
----- Message from Lizette Malik sent at 5/7/2018 12:43 PM CDT -----  Contact: pt  Pt stated he's calling for a refill on medication, he can be reached at [phone] Thanks      1. What is the name of the medication you are requesting? Nitro  2. What is the dose? 0.4 mg  3. How do you take the medication? Orally, topically, etc? orallly  4. How often do you take this medication? 1 a day or as needed  5. Do you need a 30 day or 90 day supply? 90  6. How many refills are you requesting?   7. What is your preferred pharmacy and location of the pharmacy?   8. Who can we contact with further questions?       [facility], [facility] - [address] [address]  Phone: [phone] Fax: [phone]

## 2018-05-07 NOTE — TELEPHONE ENCOUNTER
----- Message from Cyn Brown LPN sent at 5/7/2018  2:47 PM CDT -----  I would love to help with this but I cant schedule labs without orders..lol  ----- Message -----  From: Clara Almaguer  Sent: 5/7/2018   2:25 PM  To: [facility] Staff    Pt at [phone]//states he has scheduled an appt with Dr Strauss on 5-14-18//is needing lab work done before the appt//need orders//please call//omar/[facility]

## 2018-05-09 ENCOUNTER — LAB VISIT (OUTPATIENT)
Dept: LAB | Facility: HOSPITAL | Age: 58
End: 2018-05-09
Attending: INTERNAL MEDICINE
Payer: COMMERCIAL

## 2018-05-09 DIAGNOSIS — N18.30 CHRONIC KIDNEY DISEASE, STAGE III (MODERATE): ICD-10-CM

## 2018-05-09 LAB
ANION GAP SERPL CALC-SCNC: 8 MMOL/L
BASOPHILS # BLD AUTO: 0.02 K/UL
BASOPHILS NFR BLD: 0.4 %
BUN SERPL-MCNC: 36 MG/DL
CALCIUM SERPL-MCNC: 9.9 MG/DL
CHLORIDE SERPL-SCNC: 107 MMOL/L
CO2 SERPL-SCNC: 26 MMOL/L
CREAT SERPL-MCNC: 2.1 MG/DL
DIFFERENTIAL METHOD: ABNORMAL
EOSINOPHIL # BLD AUTO: 0.2 K/UL
EOSINOPHIL NFR BLD: 4.1 %
ERYTHROCYTE [DISTWIDTH] IN BLOOD BY AUTOMATED COUNT: 13.3 %
EST. GFR  (AFRICAN AMERICAN): 38.7 ML/MIN/1.73 M^2
EST. GFR  (NON AFRICAN AMERICAN): 33.4 ML/MIN/1.73 M^2
GLUCOSE SERPL-MCNC: 115 MG/DL
HCT VFR BLD AUTO: 36.6 %
HGB BLD-MCNC: 12.4 G/DL
IMM GRANULOCYTES # BLD AUTO: 0.01 K/UL
IMM GRANULOCYTES NFR BLD AUTO: 0.2 %
LYMPHOCYTES # BLD AUTO: 1.5 K/UL
LYMPHOCYTES NFR BLD: 32.4 %
MCH RBC QN AUTO: 30 PG
MCHC RBC AUTO-ENTMCNC: 33.9 G/DL
MCV RBC AUTO: 88 FL
MONOCYTES # BLD AUTO: 0.6 K/UL
MONOCYTES NFR BLD: 12.2 %
NEUTROPHILS # BLD AUTO: 2.4 K/UL
NEUTROPHILS NFR BLD: 50.7 %
NRBC BLD-RTO: 0 /100 WBC
PLATELET # BLD AUTO: 216 K/UL
PMV BLD AUTO: 9.8 FL
POTASSIUM SERPL-SCNC: 3.9 MMOL/L
RBC # BLD AUTO: 4.14 M/UL
SODIUM SERPL-SCNC: 141 MMOL/L
WBC # BLD AUTO: 4.66 K/UL

## 2018-05-09 PROCEDURE — 36415 COLL VENOUS BLD VENIPUNCTURE: CPT | Mod: PO

## 2018-05-09 PROCEDURE — 80048 BASIC METABOLIC PNL TOTAL CA: CPT

## 2018-05-09 PROCEDURE — 85025 COMPLETE CBC W/AUTO DIFF WBC: CPT

## 2018-05-09 RX ORDER — NITROGLYCERIN 0.4 MG/1
TABLET SUBLINGUAL
Qty: 25 TABLET | Refills: 11 | Status: CANCELLED | OUTPATIENT
Start: 2018-05-09

## 2018-05-09 NOTE — TELEPHONE ENCOUNTER
----- Message from Hermila Fernandez sent at 5/9/2018 10:12 AM CDT -----  Contact: pt  Pt states he called in a refill for nitrocglyn    to [facility], no respone, please call pt @ [phone].

## 2018-05-09 NOTE — TELEPHONE ENCOUNTER
Returned call. Patient stated requested NTG refill to [facility].    Patient was informed refill sent and return recipt returned. Will contact [employer].

## 2018-05-14 ENCOUNTER — OFFICE VISIT (OUTPATIENT)
Dept: NEPHROLOGY | Facility: CLINIC | Age: 58
End: 2018-05-14
Payer: COMMERCIAL

## 2018-05-14 VITALS
DIASTOLIC BLOOD PRESSURE: 78 MMHG | HEIGHT: 71 IN | WEIGHT: 240.31 LBS | HEART RATE: 71 BPM | BODY MASS INDEX: 33.64 KG/M2 | SYSTOLIC BLOOD PRESSURE: 110 MMHG

## 2018-05-14 DIAGNOSIS — I12.9 HYPERTENSIVE NEPHROPATHY, STAGE 1-4 OR UNSPECIFIED CHRONIC KIDNEY DISEASE: ICD-10-CM

## 2018-05-14 DIAGNOSIS — N18.30 CHRONIC KIDNEY DISEASE, STAGE III (MODERATE): Primary | ICD-10-CM

## 2018-05-14 DIAGNOSIS — I10 ESSENTIAL HYPERTENSION: ICD-10-CM

## 2018-05-14 DIAGNOSIS — Z71.89 ENCOUNTER FOR MEDICATION REVIEW AND COUNSELING: ICD-10-CM

## 2018-05-14 PROCEDURE — 3078F DIAST BP <80 MM HG: CPT | Mod: CPTII,S$GLB,, | Performed by: INTERNAL MEDICINE

## 2018-05-14 PROCEDURE — 99999 PR PBB SHADOW E&M-EST. PATIENT-LVL III: CPT | Mod: PBBFAC,,, | Performed by: INTERNAL MEDICINE

## 2018-05-14 PROCEDURE — 99214 OFFICE O/P EST MOD 30 MIN: CPT | Mod: S$GLB,,, | Performed by: INTERNAL MEDICINE

## 2018-05-14 PROCEDURE — 3008F BODY MASS INDEX DOCD: CPT | Mod: CPTII,S$GLB,, | Performed by: INTERNAL MEDICINE

## 2018-05-14 PROCEDURE — 3074F SYST BP LT 130 MM HG: CPT | Mod: CPTII,S$GLB,, | Performed by: INTERNAL MEDICINE

## 2018-05-14 RX ORDER — AMLODIPINE BESYLATE 5 MG/1
5 TABLET ORAL DAILY
Qty: 90 TABLET | Refills: 3 | Status: SHIPPED | OUTPATIENT
Start: 2018-05-14 | End: 2019-05-14 | Stop reason: SDUPTHER

## 2018-05-14 RX ORDER — DULAGLUTIDE 1.5 MG/.5ML
INJECTION, SOLUTION SUBCUTANEOUS
Refills: 5 | COMMUNITY
Start: 2018-05-07

## 2018-05-14 RX ORDER — INSULIN GLARGINE 300 U/ML
INJECTION, SOLUTION SUBCUTANEOUS
Refills: 5 | COMMUNITY
Start: 2018-05-07

## 2018-05-14 NOTE — PROGRESS NOTES
NEPHROLOGY CLINIC FOLLOWUP NOTE:  Date of clinic visit: 5/14/18     REASON FOR FOLLOWUP AND CHIEF COMPLAINT:  History of chronic kidney disease.       ORTHOPEDIC SURGEON:  Mendoza Wray MD     HISTORY OF PRESENT ILLNESS:  Mr. Cox is a 58-year-old  male with the history of diabetes mellitus, hypertension and CKD stage III, who presents for followup. He was last seen by us about 6 months ago. He reports no new problems since then. He says he is feeling well today.  No acute issues, no new problems, no chest pain, no shortness of breath, no leg swelling. He has been taking his medications as prescribed and there are no new or active issues today. Life style was discussed with pt. He remains physically active and avoids unhealthy foods. Noted BP is slightly low. Pt has no lightheadedness.     PAST MEDICAL HISTORY:  1.  CKD stage III, estimated GFR about 40 mL/min, baseline creatinine is close   to 2 mg/dL.  2.  Hypertension.  3.  Diabetes mellitus for at least 10 years.  4.  History of coronary artery disease with past history of MI at the age of   19/premature heart disease and history of CABG in 2001.  5.  Diastolic congestive heart failure.  6.  Obstructive sleep apnea, on CPAP.  7.  Gout.  8.  Restless leg syndrome.  9.  Hyperlipidemia.  10.  Bursitis of the right elbow.     PAST SURGICAL HISTORY:  Reviewed as above unchanged.     FAMILY HISTORY:  Reviewed and unchanged.  Mother passed away at the age of 52   from pancreatic cancer.  Multiple family members with diabetes mellitus.  Father   is alive and well in his 70s.     ALLERGIES:  Reviewed.  No known drug allergies.     SOCIAL HISTORY:  Negative for smoking.  No alcohol use.     MEDICATIONS:  Reviewed.  Aspirin 81 mg daily, amlodipine 10 mg daily,   fenofibrate 67 mg daily, Lasix 20 mg daily, Neurontin 300 mg b.i.d., insulin,   metoprolol 100 mg b.i.d., Ranexa 500 mg b.i.d., valsartan/HCTZ 320/25 one p.o.   daily, Brilinta 90 mg b.i.d.  1.   Sinus already.     REVIEW OF SYSTEMS:  No recent hospitalizations.  GENERAL:  Negative.  HEAD, EYES, EARS, NOSE AND THROAT:  Negative.  CARDIAC:  Negative.  PULMONARY:  Negative.  GASTROINTESTINAL:  Negative.  GENITOURINARY:  Negative.  PSYCHOLOGICAL:  Negative.  NEUROLOGICAL:  Negative.  ENDOCRINE:  As above, otherwise negative.  HEMATOLOGIC AND ONCOLOGIC:  Negative.  INFECTIOUS DISEASE:  Negative.  The rest of the review of systems negative.     PHYSICAL EXAMINATION:  VITAL SIGNS:  Blood pressure is 110/78, pulse is 71, weight is 240 lbs, last visit 244 lbs  GENERAL:  He is cooperative, pleasant, atraumatic, normocephalic, well   developed, well nourished.  Speech and thought process appropriate, normal.  HEENT:  Mucous membranes moist.  NECK:  No JVD.  HEART:  Regular rate and rhythm, S1 and S2 audible.  No rubs.  CHEST:  Clear to auscultation.  No rales.  No wheezes.  Breathing symmetric and   unlabored.  EXTREMITIES:  No edema.     LABORATORY:  Reviewed.   BMP  Lab Results   Component Value Date     05/09/2018    K 3.9 05/09/2018     05/09/2018    CO2 26 05/09/2018    BUN 36 (H) 05/09/2018    CREATININE 2.1 (H) 05/09/2018    CALCIUM 9.9 05/09/2018    ANIONGAP 8 05/09/2018    ESTGFRAFRICA 38.7 (A) 05/09/2018    EGFRNONAA 33.4 (A) 05/09/2018     Lab Results   Component Value Date    WBC 4.66 05/09/2018    HGB 12.4 (L) 05/09/2018    HCT 36.6 (L) 05/09/2018    MCV 88 05/09/2018     05/09/2018     Lab Results   Component Value Date    PTH 69.0 11/21/2017    CALCIUM 9.9 05/09/2018        Urine protein//Cr close to zero  PSA was 0.6.     ASSESSMENT AND PLAN:  This is a 58-year-old man with CKD stage III, who presents for followup.  The impression is as follows:     1.  Renal.  Renal function is stable.  Creatinine has not changed or worsened since last visit  CKD stage 3  iPTH normal, secondary hyperparathyroidism not present  Noted no proteinuria, likely does not have diabetic nephropathy  Likely  cause of CKD is hypertensive nephrosclerosis and vascular disease  Noted h/o of premature CAD (MI in his 20's)     2. HTN: BP well controlled to low  Asymptomatic   Meds reviewed  Will adjust amlodipine dose     3.  Electrolytes were reviewed.    Potassium is normal  Acid base that is stable.    Mild hypercalcemia is the past, s Ca currently normal  Likely due to hydrochlorothiazide.      4.  Cardiac. H/o of diastolic CHF  He,modynamically stable, well compensated  Continue diuretics, Lasix and HCTZ.       5.  H/o of diabetes mellitus   Has very low or no significant proteinuria  Hemoglobin A1c reviewed     6. Diet and lifestyle modifications: was previosuly discussed with patient.  Advised pt to avoid high sugar (even artificial sweeteners) foods and drinks.      PLANS AND RECOMMENDATIONS:    As discussed above.  Decrease amlodipine to 5 mg po qd  Return to see us in about 6 months    Total time spent 25 minutes, more than 50% of the time was spent on counseling   coordination of care.      Bigg Strauss MD

## 2018-05-16 ENCOUNTER — TELEPHONE (OUTPATIENT)
Dept: INTERNAL MEDICINE | Facility: CLINIC | Age: 58
End: 2018-05-16

## 2018-05-16 DIAGNOSIS — E11.8 TYPE 2 DIABETES MELLITUS WITH COMPLICATION, WITHOUT LONG-TERM CURRENT USE OF INSULIN: Primary | ICD-10-CM

## 2018-05-16 NOTE — TELEPHONE ENCOUNTER
----- Message from Diamond Richardson sent at 5/16/2018  9:15 AM CDT -----  Contact: self [phone]  Would like to consult with nurse regarding referral for specialist.  Please call back at [phone].  Md Greg

## 2018-05-16 NOTE — TELEPHONE ENCOUNTER
Patient is requesting a referral to Dr. Chávez endocrinologist. Not pleased with his current outside doctor. Dx diabetes. Please advise.

## 2018-06-01 ENCOUNTER — TELEPHONE (OUTPATIENT)
Dept: NEPHROLOGY | Facility: CLINIC | Age: 58
End: 2018-06-01

## 2018-06-01 NOTE — TELEPHONE ENCOUNTER
----- Message from Cisco Rivera sent at 6/1/2018  2:20 PM CDT -----  Contact: [phone]   Pt would like to consult with the nurse about low blood pressure.  Please call back at [phone]. Thx-AH

## 2018-06-01 NOTE — TELEPHONE ENCOUNTER
Confirmed with pt. That he did decrease dose of amlodipine to 5mg per Dr. Strauss at last visit. I spoke with Dr. Strauss who advised pt. To stop amlodipine and come in for sooner appt. Pt. Notified and scheduled for sooner appt. Instructed to call if needed.

## 2018-06-08 RX ORDER — TIZANIDINE 4 MG/1
TABLET ORAL
Qty: 50 TABLET | Refills: 3 | Status: ON HOLD | OUTPATIENT
Start: 2018-06-08 | End: 2018-09-30 | Stop reason: HOSPADM

## 2018-07-25 ENCOUNTER — TELEPHONE (OUTPATIENT)
Dept: INTERNAL MEDICINE | Facility: CLINIC | Age: 58
End: 2018-07-25

## 2018-07-25 RX ORDER — IRBESARTAN AND HYDROCHLOROTHIAZIDE 300; 12.5 MG/1; MG/1
1 TABLET, FILM COATED ORAL DAILY
Qty: 90 TABLET | Refills: 3 | Status: ON HOLD | OUTPATIENT
Start: 2018-07-25 | End: 2018-09-30 | Stop reason: HOSPADM

## 2018-07-25 NOTE — TELEPHONE ENCOUNTER
Received fax requesting to change Valsartan/HCT to an alternate due to recall of Valsartan.  Please advise.

## 2018-07-27 ENCOUNTER — TELEPHONE (OUTPATIENT)
Dept: NEPHROLOGY | Facility: CLINIC | Age: 58
End: 2018-07-27

## 2018-07-27 NOTE — TELEPHONE ENCOUNTER
Pt was prescribed irbesartan-hydrochlorothiazide .  Pt would like to know if this medication is ok to take with his CKD.

## 2018-07-27 NOTE — TELEPHONE ENCOUNTER
S/W pt informed per Dr Strauss that irbesartan-hydrochlorothiazide is ok to take .  Pt verbalized understanding.  VB

## 2018-07-27 NOTE — TELEPHONE ENCOUNTER
----- Message from Jenny Morejon sent at 7/27/2018  8:44 AM CDT -----  Contact: Pt  Pt called and stated he needed to speak to the nurse to get clarity on the valsartan medication he was prescribed. He can be reached at [phone].    Thanks,  TF

## 2018-08-02 DIAGNOSIS — N18.30 CKD (CHRONIC KIDNEY DISEASE) STAGE 3, GFR 30-59 ML/MIN: Primary | ICD-10-CM

## 2018-08-09 RX ORDER — FENOFIBRATE 67 MG/1
CAPSULE ORAL
Qty: 30 CAPSULE | Refills: 11 | Status: ON HOLD | OUTPATIENT
Start: 2018-08-09 | End: 2018-09-30 | Stop reason: HOSPADM

## 2018-08-28 ENCOUNTER — OFFICE VISIT (OUTPATIENT)
Dept: CARDIOLOGY | Facility: CLINIC | Age: 58
End: 2018-08-28
Payer: COMMERCIAL

## 2018-08-28 VITALS
HEART RATE: 73 BPM | SYSTOLIC BLOOD PRESSURE: 124 MMHG | HEIGHT: 71 IN | DIASTOLIC BLOOD PRESSURE: 82 MMHG | BODY MASS INDEX: 33.73 KG/M2 | WEIGHT: 240.94 LBS

## 2018-08-28 DIAGNOSIS — I25.10 CORONARY ARTERY DISEASE INVOLVING NATIVE CORONARY ARTERY OF NATIVE HEART WITHOUT ANGINA PECTORIS: Primary | ICD-10-CM

## 2018-08-28 DIAGNOSIS — I34.0 MITRAL VALVE INSUFFICIENCY, UNSPECIFIED ETIOLOGY: ICD-10-CM

## 2018-08-28 DIAGNOSIS — I34.0 NON-RHEUMATIC MITRAL REGURGITATION: ICD-10-CM

## 2018-08-28 DIAGNOSIS — G47.33 OSA ON CPAP: ICD-10-CM

## 2018-08-28 DIAGNOSIS — E11.59 HYPERTENSION ASSOCIATED WITH DIABETES: ICD-10-CM

## 2018-08-28 DIAGNOSIS — I15.2 HYPERTENSION ASSOCIATED WITH DIABETES: ICD-10-CM

## 2018-08-28 DIAGNOSIS — Z79.4 TYPE 2 DIABETES MELLITUS WITH DIABETIC NEPHROPATHY, WITH LONG-TERM CURRENT USE OF INSULIN: ICD-10-CM

## 2018-08-28 DIAGNOSIS — E11.21 TYPE 2 DIABETES MELLITUS WITH DIABETIC NEPHROPATHY, WITH LONG-TERM CURRENT USE OF INSULIN: ICD-10-CM

## 2018-08-28 DIAGNOSIS — R06.3 CENTRAL SLEEP APNEA DUE TO CHEYNE-STOKES RESPIRATION: ICD-10-CM

## 2018-08-28 DIAGNOSIS — E11.69 HYPERLIPIDEMIA ASSOCIATED WITH TYPE 2 DIABETES MELLITUS: ICD-10-CM

## 2018-08-28 DIAGNOSIS — E78.5 HYPERLIPIDEMIA ASSOCIATED WITH TYPE 2 DIABETES MELLITUS: ICD-10-CM

## 2018-08-28 PROCEDURE — 99999 PR PBB SHADOW E&M-EST. PATIENT-LVL IV: CPT | Mod: PBBFAC,,, | Performed by: NURSE PRACTITIONER

## 2018-08-28 PROCEDURE — 3079F DIAST BP 80-89 MM HG: CPT | Mod: CPTII,S$GLB,, | Performed by: NURSE PRACTITIONER

## 2018-08-28 PROCEDURE — 99214 OFFICE O/P EST MOD 30 MIN: CPT | Mod: S$GLB,,, | Performed by: NURSE PRACTITIONER

## 2018-08-28 PROCEDURE — 3045F PR MOST RECENT HEMOGLOBIN A1C LEVEL 7.0-9.0%: CPT | Mod: CPTII,S$GLB,, | Performed by: NURSE PRACTITIONER

## 2018-08-28 PROCEDURE — 3074F SYST BP LT 130 MM HG: CPT | Mod: CPTII,S$GLB,, | Performed by: NURSE PRACTITIONER

## 2018-08-28 PROCEDURE — 3008F BODY MASS INDEX DOCD: CPT | Mod: CPTII,S$GLB,, | Performed by: NURSE PRACTITIONER

## 2018-08-28 RX ORDER — NITROGLYCERIN 0.4 MG/1
TABLET SUBLINGUAL
Qty: 25 TABLET | Refills: 11 | Status: SHIPPED | OUTPATIENT
Start: 2018-08-28 | End: 2018-10-05 | Stop reason: SDUPTHER

## 2018-08-28 NOTE — PROGRESS NOTES
Subjective:   Patient ID:  Christiano Cox is a 58 y.o. male who presents for follow up of Coronary Artery Disease      HPI   Patient presents to clinic for follow up and management of CAD. He has PMH CAD, NSTEMI, sleep apnea in which he uses CPAP nightly, DM, HLP, HTN, CABG x3. He had 2D echo that shows decline in EF from 45% in 2016 to 35% in May 2017.  Patient underwent angiogram on 5/16/16 for NSTEMI that showed occluded rca, lad and lcx, occluded svg to pda, severe proximal stenosis of the svg to om treated with resolute stent and filter wire protection. Also noted to have patent lima to lad and Normal filling pressure. Successful PCI with GRAHAM to SVG to OM1. Uses CPAP nightly. Denies any chest pain, SOB, orthopnea, PND, edema, dizziness, near syncope or syncope. Has no abnormal bleeding on DAPT.  Still using PRN SL nitro. Has been very active. Does lots of  Walking at work and event takes the stairs. Creatine stable in May 2018. A1C checked at outside lab and 7.2, per patient. Needing lipids checked.       Past Medical History:   Diagnosis Date    Acute coronary syndrome     Coronary artery disease involving native coronary artery without angina pectoris 9/13/2016    Coronary atherosclerosis of unspecified type of vessel, native or graft     s/p cabg and stents    DM (diabetes mellitus), type 2 with complications     History of gout     Hyperlipidemia associated with type 2 diabetes mellitus     Hypertension associated with diabetes     NSTEMI (non-ST elevated myocardial infarction) 5/16/2016    Obesity, unspecified     SOFIYA on CPAP     S/P CABG x 3     Type II diabetes mellitus with renal manifestations        Past Surgical History:   Procedure Laterality Date    CARDIAC CATHETERIZATION      CORONARY ANGIOPLASTY      CORONARY ARTERY BYPASS GRAFT         Social History     Tobacco Use    Smoking status: Never Smoker    Smokeless tobacco: Never Used   Substance Use Topics    Alcohol use: Yes     "Drug use: No       Family History   Problem Relation Age of Onset    Diabetes Mother     Hypertension Father     Stroke Sister     Diabetes Sister     Heart disease Brother     Diabetes Brother     Hypertension Brother        Current Outpatient Medications   Medication Sig    ACCU-CHEK MULTICLIX LANCET lancets TEST DAILY AS DIRECTED    amLODIPine (NORVASC) 5 MG tablet Take 1 tablet (5 mg total) by mouth once daily.    aspirin 81 MG Chew Take 81 mg by mouth once daily.    atorvastatin (LIPITOR) 80 MG tablet ONE BY MOUTH AT BEDTIME    fenofibrate micronized (LOFIBRA) 67 MG capsule TAKE ONE CAPSULE BY MOUTH DAILY WITH breakfast.    furosemide (LASIX) 20 MG tablet TAKE ONE TABLET BY MOUTH DAILY    irbesartan-hydrochlorothiazide (AVALIDE) 300-12.5 mg per tablet Take 1 tablet by mouth once daily.    isosorbide mononitrate (IMDUR) 30 MG 24 hr tablet TAKE ONE TABLET BY MOUTH EVERY DAY    levothyroxine (SYNTHROID) 75 MCG tablet Take 1 tablet (75 mcg total) by mouth once daily.    metoprolol tartrate (LOPRESSOR) 100 MG tablet TAKE ONE TABLET BY MOUTH TWICE DAILY    nitroGLYCERIN (NITROSTAT) 0.4 MG SL tablet PLACE ONE TABLET UNDER THE TONGUE EVERY FIVE MINUTES AS NEEDED FOR CHEST PAIN    ranolazine (RANEXA) 500 MG Tb12 Take 1 tablet (500 mg total) by mouth 2 (two) times daily.    SURE COMFORT PEN NEEDLE 31 gauge x 5/16" Ndle AS DIRECTED    ticagrelor (BRILINTA) 90 mg tablet Take 1 tablet (90 mg total) by mouth 2 (two) times daily.    tiZANidine (ZANAFLEX) 4 MG tablet TAKE ONE TABLET BY MOUTH EVERY 6 HOURS AS NEEDED    TOUJEO SOLOSTAR U-300 INSULIN 300 unit/mL (1.5 mL) InPn pen INJECT 60 UNITS SUBCUTANEOUSLY ONCE A DAY    TRULICITY 1.5 mg/0.5 mL PnIj INJECT 1.5 MG SUBCUTANEOUSLY ONCE WEEKLY     No current facility-administered medications for this visit.      Current Outpatient Medications on File Prior to Visit   Medication Sig    ACCU-CHEK MULTICLIX LANCET lancets TEST DAILY AS DIRECTED    amLODIPine " "(NORVASC) 5 MG tablet Take 1 tablet (5 mg total) by mouth once daily.    aspirin 81 MG Chew Take 81 mg by mouth once daily.    atorvastatin (LIPITOR) 80 MG tablet ONE BY MOUTH AT BEDTIME    fenofibrate micronized (LOFIBRA) 67 MG capsule TAKE ONE CAPSULE BY MOUTH DAILY WITH breakfast.    furosemide (LASIX) 20 MG tablet TAKE ONE TABLET BY MOUTH DAILY    irbesartan-hydrochlorothiazide (AVALIDE) 300-12.5 mg per tablet Take 1 tablet by mouth once daily.    isosorbide mononitrate (IMDUR) 30 MG 24 hr tablet TAKE ONE TABLET BY MOUTH EVERY DAY    levothyroxine (SYNTHROID) 75 MCG tablet Take 1 tablet (75 mcg total) by mouth once daily.    metoprolol tartrate (LOPRESSOR) 100 MG tablet TAKE ONE TABLET BY MOUTH TWICE DAILY    nitroGLYCERIN (NITROSTAT) 0.4 MG SL tablet PLACE ONE TABLET UNDER THE TONGUE EVERY FIVE MINUTES AS NEEDED FOR CHEST PAIN    ranolazine (RANEXA) 500 MG Tb12 Take 1 tablet (500 mg total) by mouth 2 (two) times daily.    SURE COMFORT PEN NEEDLE 31 gauge x 5/16" Ndle AS DIRECTED    ticagrelor (BRILINTA) 90 mg tablet Take 1 tablet (90 mg total) by mouth 2 (two) times daily.    tiZANidine (ZANAFLEX) 4 MG tablet TAKE ONE TABLET BY MOUTH EVERY 6 HOURS AS NEEDED    TOUJEO SOLOSTAR U-300 INSULIN 300 unit/mL (1.5 mL) InPn pen INJECT 60 UNITS SUBCUTANEOUSLY ONCE A DAY    TRULICITY 1.5 mg/0.5 mL PnIj INJECT 1.5 MG SUBCUTANEOUSLY ONCE WEEKLY     No current facility-administered medications on file prior to visit.        Review of Systems   Constitution: Negative for diaphoresis, weakness, malaise/fatigue, weight gain and weight loss.   HENT: Negative for congestion and nosebleeds.    Cardiovascular: Negative for chest pain, claudication, cyanosis, dyspnea on exertion, irregular heartbeat, leg swelling, near-syncope, orthopnea, palpitations, paroxysmal nocturnal dyspnea and syncope.   Respiratory: Negative for cough, hemoptysis, shortness of breath, sleep disturbances due to breathing, snoring, sputum " "production and wheezing.    Hematologic/Lymphatic: Negative for bleeding problem. Does not bruise/bleed easily.   Skin: Negative for rash.   Musculoskeletal: Negative for arthritis, back pain, falls, joint pain, muscle cramps and muscle weakness.   Gastrointestinal: Negative for abdominal pain, constipation, diarrhea, heartburn, hematemesis, hematochezia, melena and nausea.   Genitourinary: Negative for dysuria, hematuria and nocturia.   Neurological: Negative for excessive daytime sleepiness, dizziness, headaches, light-headedness, loss of balance, numbness and vertigo.       Objective:   Physical Exam   Constitutional: He is oriented to person, place, and time. He appears well-developed and well-nourished.   Neck: Neck supple. No JVD present.   Cardiovascular: Normal rate, regular rhythm and normal pulses.  Occasional extrasystoles are present. Exam reveals no friction rub.   Murmur heard.  High-pitched blowing holosystolic murmur is present at the apex.  Pulmonary/Chest: Effort normal and breath sounds normal. No respiratory distress. He has no wheezes. He has no rales.   Sternal scar    Abdominal: Soft. Bowel sounds are normal. He exhibits no distension.   Musculoskeletal: He exhibits edema ( trace BLE ). He exhibits no tenderness.   Neurological: He is alert and oriented to person, place, and time.   Skin: Skin is warm and dry. No rash noted.   Psychiatric: He has a normal mood and affect. His behavior is normal.   Nursing note and vitals reviewed.    Vitals:    08/28/18 1512   BP: 124/82   Pulse: 73   Weight: 109.3 kg (240 lb 15.4 oz)   Height: 5' 11" (1.803 m)     Lab Results   Component Value Date    CHOL 155 10/10/2017    CHOL 161 06/05/2017    CHOL 153 02/14/2017     Lab Results   Component Value Date    HDL 36 (L) 10/10/2017    HDL 36 06/05/2017    HDL 33 (L) 02/14/2017     Lab Results   Component Value Date    LDLCALC 100.4 10/10/2017    LDLCALC 103 06/05/2017    LDLCALC 97.2 02/14/2017     Lab Results "   Component Value Date    TRIG 93 10/10/2017    TRIG 108 06/05/2017    TRIG 114 02/14/2017     Lab Results   Component Value Date    CHOLHDL 23.2 10/10/2017    CHOLHDL 21.6 02/14/2017    CHOLHDL 21.3 09/13/2016       Chemistry        Component Value Date/Time     05/09/2018 0804    K 3.9 05/09/2018 0804     05/09/2018 0804    CO2 26 05/09/2018 0804    BUN 36 (H) 05/09/2018 0804    CREATININE 2.1 (H) 05/09/2018 0804     (H) 05/09/2018 0804        Component Value Date/Time    CALCIUM 9.9 05/09/2018 0804    ALKPHOS 60 10/10/2017 0837    AST 34 10/10/2017 0837    ALT 37 10/10/2017 0837    BILITOT 0.5 10/10/2017 0837    ESTGFRAFRICA 38.7 (A) 05/09/2018 0804    EGFRNONAA 33.4 (A) 05/09/2018 0804          Lab Results   Component Value Date    TSH 4.623 (H) 10/10/2017     Lab Results   Component Value Date    INR 1.0 05/15/2016     Lab Results   Component Value Date    WBC 4.66 05/09/2018    HGB 12.4 (L) 05/09/2018    HCT 36.6 (L) 05/09/2018    MCV 88 05/09/2018     05/09/2018     BMP  Sodium   Date Value Ref Range Status   05/09/2018 141 136 - 145 mmol/L Final     Potassium   Date Value Ref Range Status   05/09/2018 3.9 3.5 - 5.1 mmol/L Final     Chloride   Date Value Ref Range Status   05/09/2018 107 95 - 110 mmol/L Final     CO2   Date Value Ref Range Status   05/09/2018 26 23 - 29 mmol/L Final     BUN, Bld   Date Value Ref Range Status   05/09/2018 36 (H) 6 - 20 mg/dL Final     Creatinine   Date Value Ref Range Status   05/09/2018 2.1 (H) 0.5 - 1.4 mg/dL Final     Calcium   Date Value Ref Range Status   05/09/2018 9.9 8.7 - 10.5 mg/dL Final     Anion Gap   Date Value Ref Range Status   05/09/2018 8 8 - 16 mmol/L Final     eGFR if    Date Value Ref Range Status   05/09/2018 38.7 (A) >60 mL/min/1.73 m^2 Final     eGFR if non    Date Value Ref Range Status   05/09/2018 33.4 (A) >60 mL/min/1.73 m^2 Final     Comment:     Calculation used to obtain the estimated  glomerular filtration  rate (eGFR) is the CKD-EPI equation.        CrCl cannot be calculated (Patient's most recent lab result is older than the maximum 7 days allowed.).    Assessment:     1. Coronary artery disease involving native coronary artery of native heart without angina pectoris    2. Hypertension associated with diabetes    3. Hyperlipidemia associated with type 2 diabetes mellitus    4. Type 2 diabetes mellitus with diabetic nephropathy, with long-term current use of insulin    5. SOFIYA on CPAP    6. Central sleep apnea due to Cheyne-Pérez respiration      Patient has no angina or equivalent.  BP stable  Using CPAP nightly  No CNS complaints to suggest TIA or CVA  No symptoms to suggest decompensated HF  A1C 8.0 in Oct 2017, but 7.2 approximately 3 months ago per patient.   Needs lipids checked   Good activity tolerance    Plan:   Continue current medical management for now  No changes needed at this time  Heart healthy diet  Maintain active lifestyle  RTC in 6 months with lipids, CMP and A1C with PCP   Check 2D echo before next visit to evaluate murmur on exam

## 2018-09-18 DIAGNOSIS — I25.10 CORONARY ARTERY DISEASE INVOLVING NATIVE CORONARY ARTERY OF NATIVE HEART WITHOUT ANGINA PECTORIS: ICD-10-CM

## 2018-09-18 DIAGNOSIS — I25.10 ATHEROSCLEROSIS OF NATIVE CORONARY ARTERY OF NATIVE HEART WITHOUT ANGINA PECTORIS: ICD-10-CM

## 2018-09-18 DIAGNOSIS — Z95.1 HX OF CABG: ICD-10-CM

## 2018-09-18 RX ORDER — TICAGRELOR 90 MG/1
TABLET ORAL
Qty: 180 TABLET | Refills: 3 | Status: ON HOLD | OUTPATIENT
Start: 2018-09-18 | End: 2019-04-16 | Stop reason: HOSPADM

## 2018-09-18 RX ORDER — RANOLAZINE 500 MG/1
TABLET, FILM COATED, EXTENDED RELEASE ORAL
Qty: 180 TABLET | Refills: 3 | Status: SHIPPED | OUTPATIENT
Start: 2018-09-18 | End: 2019-06-25 | Stop reason: SDUPTHER

## 2018-09-25 ENCOUNTER — OFFICE VISIT (OUTPATIENT)
Dept: INTERNAL MEDICINE | Facility: CLINIC | Age: 58
End: 2018-09-25
Payer: COMMERCIAL

## 2018-09-25 VITALS
OXYGEN SATURATION: 99 % | HEART RATE: 82 BPM | RESPIRATION RATE: 16 BRPM | HEIGHT: 72 IN | SYSTOLIC BLOOD PRESSURE: 138 MMHG | TEMPERATURE: 99 F | DIASTOLIC BLOOD PRESSURE: 80 MMHG | WEIGHT: 239.88 LBS | BODY MASS INDEX: 32.49 KG/M2

## 2018-09-25 DIAGNOSIS — I15.2 HYPERTENSION ASSOCIATED WITH DIABETES: ICD-10-CM

## 2018-09-25 DIAGNOSIS — L03.113 CELLULITIS OF RIGHT ELBOW: Primary | ICD-10-CM

## 2018-09-25 DIAGNOSIS — E11.59 HYPERTENSION ASSOCIATED WITH DIABETES: ICD-10-CM

## 2018-09-25 DIAGNOSIS — E11.21 TYPE 2 DIABETES MELLITUS WITH DIABETIC NEPHROPATHY, WITH LONG-TERM CURRENT USE OF INSULIN: ICD-10-CM

## 2018-09-25 DIAGNOSIS — Z79.4 TYPE 2 DIABETES MELLITUS WITH DIABETIC NEPHROPATHY, WITH LONG-TERM CURRENT USE OF INSULIN: ICD-10-CM

## 2018-09-25 DIAGNOSIS — E11.8 TYPE 2 DIABETES MELLITUS WITH COMPLICATION, WITHOUT LONG-TERM CURRENT USE OF INSULIN: ICD-10-CM

## 2018-09-25 PROCEDURE — 3075F SYST BP GE 130 - 139MM HG: CPT | Mod: CPTII,S$GLB,, | Performed by: INTERNAL MEDICINE

## 2018-09-25 PROCEDURE — 3045F PR MOST RECENT HEMOGLOBIN A1C LEVEL 7.0-9.0%: CPT | Mod: CPTII,S$GLB,, | Performed by: INTERNAL MEDICINE

## 2018-09-25 PROCEDURE — 96372 THER/PROPH/DIAG INJ SC/IM: CPT | Mod: S$GLB,,, | Performed by: INTERNAL MEDICINE

## 2018-09-25 PROCEDURE — 99999 PR PBB SHADOW E&M-EST. PATIENT-LVL V: CPT | Mod: PBBFAC,,, | Performed by: INTERNAL MEDICINE

## 2018-09-25 PROCEDURE — 99214 OFFICE O/P EST MOD 30 MIN: CPT | Mod: 25,S$GLB,, | Performed by: INTERNAL MEDICINE

## 2018-09-25 PROCEDURE — 3079F DIAST BP 80-89 MM HG: CPT | Mod: CPTII,S$GLB,, | Performed by: INTERNAL MEDICINE

## 2018-09-25 PROCEDURE — 3008F BODY MASS INDEX DOCD: CPT | Mod: CPTII,S$GLB,, | Performed by: INTERNAL MEDICINE

## 2018-09-25 RX ORDER — BLOOD-GLUCOSE METER
KIT MISCELLANEOUS
Refills: 3 | COMMUNITY
Start: 2018-07-09

## 2018-09-25 RX ORDER — CEFTRIAXONE 1 G/1
1 INJECTION, POWDER, FOR SOLUTION INTRAMUSCULAR; INTRAVENOUS
Status: COMPLETED | OUTPATIENT
Start: 2018-09-25 | End: 2018-09-25

## 2018-09-25 RX ORDER — ROSUVASTATIN CALCIUM 40 MG/1
40 TABLET, COATED ORAL NIGHTLY
Refills: 1 | Status: ON HOLD | COMMUNITY
Start: 2018-08-30 | End: 2018-09-30 | Stop reason: HOSPADM

## 2018-09-25 RX ORDER — AMOXICILLIN AND CLAVULANATE POTASSIUM 875; 125 MG/1; MG/1
1 TABLET, FILM COATED ORAL EVERY 12 HOURS
Qty: 20 TABLET | Refills: 0 | Status: ON HOLD | OUTPATIENT
Start: 2018-09-25 | End: 2018-09-30 | Stop reason: HOSPADM

## 2018-09-25 RX ADMIN — CEFTRIAXONE 1 G: 1 INJECTION, POWDER, FOR SOLUTION INTRAMUSCULAR; INTRAVENOUS at 04:09

## 2018-09-25 NOTE — PROGRESS NOTES
HPI:  Patient is a 58-year-old man who comes today with complaints of painful swollen right elbow.  He states has been present for several days.  He denies any traumatic injury.  He denies any fever chills sweats.    Current meds have been verified and updated per the EMR  Exam:/80   Pulse 82   Temp 99.3 °F (37.4 °C)   Resp 16   Ht 6' (1.829 m)   Wt 108.8 kg (239 lb 13.8 oz)   SpO2 99%   BMI 32.53 kg/m²   His right posterior elbow the skin is extremely warm tender erythematous.  Extends from the distal arm to the mid forearm area.  He has full flexion extension and pronation and supination of the elbow without any significant pain.    Lab Results   Component Value Date    WBC 4.66 05/09/2018    HGB 12.4 (L) 05/09/2018    HCT 36.6 (L) 05/09/2018     05/09/2018    CHOL 155 10/10/2017    TRIG 93 10/10/2017    HDL 36 (L) 10/10/2017    ALT 37 10/10/2017    AST 34 10/10/2017     05/09/2018    K 3.9 05/09/2018     05/09/2018    CREATININE 2.1 (H) 05/09/2018    BUN 36 (H) 05/09/2018    CO2 26 05/09/2018    TSH 4.623 (H) 10/10/2017    PSA 0.70 10/10/2017    INR 1.0 05/15/2016    HGBA1C 8.0 (H) 10/10/2017       Impression:  Cellulitis/soft tissue infection of the right elbow  Patient Active Problem List   Diagnosis    Coronary atherosclerosis    DM (diabetes mellitus), type 2 with complications    SOFIYA on CPAP    Type II diabetes mellitus with renal manifestations    Hypertension associated with diabetes    Hyperlipidemia associated with type 2 diabetes mellitus    History of gout    Lumbar radiculopathy    Olecranon bone spur    Olecranon bursitis of right elbow    Coronary artery disease involving native coronary artery without angina pectoris    Central sleep apnea due to Cheyne-Pérez respiration    Periodic limb movement disorder (PLMD)    Restless legs syndrome (RLS)    Shift work sleep disorder    Poor sleep hygiene       Plan:  Orders Placed This Encounter    cefTRIAXone  injection 1 g    amoxicillin-clavulanate 875-125mg (AUGMENTIN) 875-125 mg per tablet     He was given 1 g Rocephin.  He was started on Augmentin.  He will see me in 3 days to reassess the infection

## 2018-09-26 RX ORDER — FUROSEMIDE 20 MG/1
TABLET ORAL
Qty: 30 TABLET | Refills: 12 | Status: SHIPPED | OUTPATIENT
Start: 2018-09-26 | End: 2019-04-03 | Stop reason: DRUGHIGH

## 2018-09-27 ENCOUNTER — HOSPITAL ENCOUNTER (INPATIENT)
Facility: HOSPITAL | Age: 58
LOS: 2 days | Discharge: HOME | DRG: 603 | End: 2018-09-30
Attending: EMERGENCY MEDICINE | Admitting: INTERNAL MEDICINE
Payer: COMMERCIAL

## 2018-09-27 DIAGNOSIS — L53.9 ERYTHEMA: ICD-10-CM

## 2018-09-27 DIAGNOSIS — E11.8 TYPE 2 DIABETES MELLITUS WITH COMPLICATION, WITH LONG-TERM CURRENT USE OF INSULIN: ICD-10-CM

## 2018-09-27 DIAGNOSIS — Z79.4 TYPE 2 DIABETES MELLITUS WITH COMPLICATION, WITH LONG-TERM CURRENT USE OF INSULIN: ICD-10-CM

## 2018-09-27 DIAGNOSIS — L03.113 CELLULITIS OF RIGHT ELBOW: Primary | ICD-10-CM

## 2018-09-27 PROCEDURE — 99285 EMERGENCY DEPT VISIT HI MDM: CPT | Mod: 25

## 2018-09-28 ENCOUNTER — TELEPHONE (OUTPATIENT)
Dept: INTERNAL MEDICINE | Facility: CLINIC | Age: 58
End: 2018-09-28

## 2018-09-28 PROBLEM — L03.113 CELLULITIS OF RIGHT ELBOW: Status: ACTIVE | Noted: 2018-09-28

## 2018-09-28 PROBLEM — N17.9 AKI (ACUTE KIDNEY INJURY): Status: ACTIVE | Noted: 2018-09-28

## 2018-09-28 LAB
ALBUMIN SERPL BCP-MCNC: 3.1 G/DL
ALBUMIN SERPL BCP-MCNC: 3.3 G/DL
ALP SERPL-CCNC: 39 U/L
ALP SERPL-CCNC: 43 U/L
ALT SERPL W/O P-5'-P-CCNC: 49 U/L
ALT SERPL W/O P-5'-P-CCNC: 53 U/L
ANION GAP SERPL CALC-SCNC: 10 MMOL/L
ANION GAP SERPL CALC-SCNC: 14 MMOL/L
APTT BLDCRRT: 28.5 SEC
AST SERPL-CCNC: 43 U/L
AST SERPL-CCNC: 50 U/L
BASOPHILS # BLD AUTO: 0.03 K/UL
BASOPHILS NFR BLD: 0.5 %
BILIRUB SERPL-MCNC: 0.3 MG/DL
BILIRUB SERPL-MCNC: 0.3 MG/DL
BILIRUB UR QL STRIP: NEGATIVE
BUN SERPL-MCNC: 39 MG/DL
BUN SERPL-MCNC: 56 MG/DL
CALCIUM SERPL-MCNC: 8.8 MG/DL
CALCIUM SERPL-MCNC: 9.4 MG/DL
CHLORIDE SERPL-SCNC: 103 MMOL/L
CHLORIDE SERPL-SCNC: 105 MMOL/L
CHOLEST SERPL-MCNC: 138 MG/DL
CHOLEST/HDLC SERPL: 5.3 {RATIO}
CLARITY UR: CLEAR
CO2 SERPL-SCNC: 19 MMOL/L
CO2 SERPL-SCNC: 20 MMOL/L
COLOR UR: YELLOW
CREAT SERPL-MCNC: 2.2 MG/DL
CREAT SERPL-MCNC: 2.7 MG/DL
CRP SERPL-MCNC: 49.8 MG/L
DIFFERENTIAL METHOD: ABNORMAL
EOSINOPHIL # BLD AUTO: 0.4 K/UL
EOSINOPHIL NFR BLD: 5.6 %
ERYTHROCYTE [DISTWIDTH] IN BLOOD BY AUTOMATED COUNT: 13.5 %
ERYTHROCYTE [SEDIMENTATION RATE] IN BLOOD BY WESTERGREN METHOD: 15 MM/HR
EST. GFR  (AFRICAN AMERICAN): 29 ML/MIN/1.73 M^2
EST. GFR  (AFRICAN AMERICAN): 37 ML/MIN/1.73 M^2
EST. GFR  (NON AFRICAN AMERICAN): 25 ML/MIN/1.73 M^2
EST. GFR  (NON AFRICAN AMERICAN): 32 ML/MIN/1.73 M^2
ESTIMATED AVG GLUCOSE: 160 MG/DL
GLUCOSE SERPL-MCNC: 254 MG/DL
GLUCOSE SERPL-MCNC: 596 MG/DL
GLUCOSE UR QL STRIP: NEGATIVE
HBA1C MFR BLD HPLC: 7.2 %
HCT VFR BLD AUTO: 32.6 %
HDLC SERPL-MCNC: 26 MG/DL
HDLC SERPL: 18.8 %
HGB BLD-MCNC: 11.8 G/DL
HGB UR QL STRIP: NEGATIVE
INR PPP: 1.1
KETONES UR QL STRIP: NEGATIVE
LDLC SERPL CALC-MCNC: 90.2 MG/DL
LEUKOCYTE ESTERASE UR QL STRIP: NEGATIVE
LYMPHOCYTES # BLD AUTO: 1.2 K/UL
LYMPHOCYTES NFR BLD: 19.8 %
MAGNESIUM SERPL-MCNC: 2.3 MG/DL
MCH RBC QN AUTO: 30.1 PG
MCHC RBC AUTO-ENTMCNC: 36.2 G/DL
MCV RBC AUTO: 83 FL
MONOCYTES # BLD AUTO: 0.5 K/UL
MONOCYTES NFR BLD: 8.5 %
NEUTROPHILS # BLD AUTO: 4.1 K/UL
NEUTROPHILS NFR BLD: 65.6 %
NITRITE UR QL STRIP: NEGATIVE
NONHDLC SERPL-MCNC: 112 MG/DL
PH UR STRIP: 6 [PH] (ref 5–8)
PHOSPHATE SERPL-MCNC: 3 MG/DL
PLATELET # BLD AUTO: 222 K/UL
PMV BLD AUTO: 8.9 FL
POCT GLUCOSE: 148 MG/DL (ref 70–110)
POCT GLUCOSE: 194 MG/DL (ref 70–110)
POCT GLUCOSE: 196 MG/DL (ref 70–110)
POTASSIUM SERPL-SCNC: 3.7 MMOL/L
POTASSIUM SERPL-SCNC: 5.6 MMOL/L
PROT SERPL-MCNC: 6.9 G/DL
PROT SERPL-MCNC: 7.3 G/DL
PROT UR QL STRIP: NEGATIVE
PROTHROMBIN TIME: 11.4 SEC
RBC # BLD AUTO: 3.92 M/UL
SODIUM SERPL-SCNC: 135 MMOL/L
SODIUM SERPL-SCNC: 136 MMOL/L
SP GR UR STRIP: 1.01 (ref 1–1.03)
TRIGL SERPL-MCNC: 109 MG/DL
TSH SERPL DL<=0.005 MIU/L-ACNC: 2.9 UIU/ML
URATE SERPL-MCNC: 9.7 MG/DL
URN SPEC COLLECT METH UR: NORMAL
UROBILINOGEN UR STRIP-ACNC: NEGATIVE EU/DL
VANCOMYCIN TROUGH SERPL-MCNC: 26 UG/ML
WBC # BLD AUTO: 6.27 K/UL

## 2018-09-28 PROCEDURE — 81003 URINALYSIS AUTO W/O SCOPE: CPT

## 2018-09-28 PROCEDURE — 84100 ASSAY OF PHOSPHORUS: CPT

## 2018-09-28 PROCEDURE — 63600175 PHARM REV CODE 636 W HCPCS: Performed by: EMERGENCY MEDICINE

## 2018-09-28 PROCEDURE — 25000003 PHARM REV CODE 250: Performed by: NURSE PRACTITIONER

## 2018-09-28 PROCEDURE — 80061 LIPID PANEL: CPT

## 2018-09-28 PROCEDURE — 86140 C-REACTIVE PROTEIN: CPT

## 2018-09-28 PROCEDURE — 36415 COLL VENOUS BLD VENIPUNCTURE: CPT

## 2018-09-28 PROCEDURE — 84443 ASSAY THYROID STIM HORMONE: CPT

## 2018-09-28 PROCEDURE — 96365 THER/PROPH/DIAG IV INF INIT: CPT

## 2018-09-28 PROCEDURE — 11000001 HC ACUTE MED/SURG PRIVATE ROOM

## 2018-09-28 PROCEDURE — 84550 ASSAY OF BLOOD/URIC ACID: CPT

## 2018-09-28 PROCEDURE — 80053 COMPREHEN METABOLIC PANEL: CPT

## 2018-09-28 PROCEDURE — 63600175 PHARM REV CODE 636 W HCPCS: Performed by: NURSE PRACTITIONER

## 2018-09-28 PROCEDURE — 80202 ASSAY OF VANCOMYCIN: CPT

## 2018-09-28 PROCEDURE — 83735 ASSAY OF MAGNESIUM: CPT

## 2018-09-28 PROCEDURE — 80053 COMPREHEN METABOLIC PANEL: CPT | Mod: 91

## 2018-09-28 PROCEDURE — 85651 RBC SED RATE NONAUTOMATED: CPT

## 2018-09-28 PROCEDURE — 85025 COMPLETE CBC W/AUTO DIFF WBC: CPT

## 2018-09-28 PROCEDURE — 99900031 HC PATIENT EDUCATION (STAT)

## 2018-09-28 PROCEDURE — 85610 PROTHROMBIN TIME: CPT

## 2018-09-28 PROCEDURE — 63600175 PHARM REV CODE 636 W HCPCS: Performed by: INTERNAL MEDICINE

## 2018-09-28 PROCEDURE — S5010 5% DEXTROSE AND 0.45% SALINE: HCPCS | Performed by: NURSE PRACTITIONER

## 2018-09-28 PROCEDURE — 25000003 PHARM REV CODE 250: Performed by: EMERGENCY MEDICINE

## 2018-09-28 PROCEDURE — 21400001 HC TELEMETRY ROOM

## 2018-09-28 PROCEDURE — 83036 HEMOGLOBIN GLYCOSYLATED A1C: CPT

## 2018-09-28 PROCEDURE — 85730 THROMBOPLASTIN TIME PARTIAL: CPT

## 2018-09-28 PROCEDURE — 25000003 PHARM REV CODE 250: Performed by: INTERNAL MEDICINE

## 2018-09-28 PROCEDURE — 96375 TX/PRO/DX INJ NEW DRUG ADDON: CPT

## 2018-09-28 PROCEDURE — 94799 UNLISTED PULMONARY SVC/PX: CPT

## 2018-09-28 RX ORDER — RAMELTEON 8 MG/1
8 TABLET ORAL NIGHTLY PRN
Status: DISCONTINUED | OUTPATIENT
Start: 2018-09-28 | End: 2018-09-30 | Stop reason: HOSPADM

## 2018-09-28 RX ORDER — ROSUVASTATIN CALCIUM 10 MG/1
40 TABLET, COATED ORAL NIGHTLY
Status: DISCONTINUED | OUTPATIENT
Start: 2018-09-28 | End: 2018-09-28

## 2018-09-28 RX ORDER — RANOLAZINE 500 MG/1
500 TABLET, EXTENDED RELEASE ORAL 2 TIMES DAILY
Status: DISCONTINUED | OUTPATIENT
Start: 2018-09-28 | End: 2018-09-30 | Stop reason: HOSPADM

## 2018-09-28 RX ORDER — IBUPROFEN 200 MG
16 TABLET ORAL
Status: DISCONTINUED | OUTPATIENT
Start: 2018-09-28 | End: 2018-09-30 | Stop reason: HOSPADM

## 2018-09-28 RX ORDER — ACETAMINOPHEN 325 MG/1
650 TABLET ORAL EVERY 4 HOURS PRN
Status: DISCONTINUED | OUTPATIENT
Start: 2018-09-28 | End: 2018-09-30 | Stop reason: HOSPADM

## 2018-09-28 RX ORDER — FUROSEMIDE 20 MG/1
20 TABLET ORAL DAILY
Status: DISCONTINUED | OUTPATIENT
Start: 2018-09-28 | End: 2018-09-28

## 2018-09-28 RX ORDER — AMLODIPINE BESYLATE 5 MG/1
5 TABLET ORAL DAILY
Status: DISCONTINUED | OUTPATIENT
Start: 2018-09-28 | End: 2018-09-30 | Stop reason: HOSPADM

## 2018-09-28 RX ORDER — INSULIN ASPART 100 [IU]/ML
0-5 INJECTION, SOLUTION INTRAVENOUS; SUBCUTANEOUS EVERY 6 HOURS PRN
Status: DISCONTINUED | OUTPATIENT
Start: 2018-09-28 | End: 2018-09-30 | Stop reason: HOSPADM

## 2018-09-28 RX ORDER — ISOSORBIDE MONONITRATE 30 MG/1
30 TABLET, EXTENDED RELEASE ORAL DAILY
Status: DISCONTINUED | OUTPATIENT
Start: 2018-09-28 | End: 2018-09-30 | Stop reason: HOSPADM

## 2018-09-28 RX ORDER — MORPHINE SULFATE 4 MG/ML
2 INJECTION, SOLUTION INTRAMUSCULAR; INTRAVENOUS
Status: COMPLETED | OUTPATIENT
Start: 2018-09-28 | End: 2018-09-28

## 2018-09-28 RX ORDER — DEXTROSE MONOHYDRATE, SODIUM CHLORIDE, AND POTASSIUM CHLORIDE 50; 1.49; 4.5 G/1000ML; G/1000ML; G/1000ML
INJECTION, SOLUTION INTRAVENOUS CONTINUOUS
Status: DISCONTINUED | OUTPATIENT
Start: 2018-09-28 | End: 2018-09-28

## 2018-09-28 RX ORDER — VANCOMYCIN HCL IN 5 % DEXTROSE 1G/250ML
1000 PLASTIC BAG, INJECTION (ML) INTRAVENOUS
Status: DISCONTINUED | OUTPATIENT
Start: 2018-09-30 | End: 2018-09-29

## 2018-09-28 RX ORDER — METOPROLOL TARTRATE 50 MG/1
100 TABLET ORAL 2 TIMES DAILY
Status: DISCONTINUED | OUTPATIENT
Start: 2018-09-28 | End: 2018-09-30 | Stop reason: HOSPADM

## 2018-09-28 RX ORDER — PANTOPRAZOLE SODIUM 40 MG/1
40 TABLET, DELAYED RELEASE ORAL DAILY
Status: DISCONTINUED | OUTPATIENT
Start: 2018-09-28 | End: 2018-09-30 | Stop reason: HOSPADM

## 2018-09-28 RX ORDER — ONDANSETRON 2 MG/ML
4 INJECTION INTRAMUSCULAR; INTRAVENOUS EVERY 8 HOURS PRN
Status: DISCONTINUED | OUTPATIENT
Start: 2018-09-28 | End: 2018-09-30 | Stop reason: HOSPADM

## 2018-09-28 RX ORDER — SODIUM CHLORIDE 0.9 % (FLUSH) 0.9 %
5 SYRINGE (ML) INJECTION
Status: DISCONTINUED | OUTPATIENT
Start: 2018-09-28 | End: 2018-09-30 | Stop reason: HOSPADM

## 2018-09-28 RX ORDER — GLUCAGON 1 MG
1 KIT INJECTION
Status: DISCONTINUED | OUTPATIENT
Start: 2018-09-28 | End: 2018-09-30 | Stop reason: HOSPADM

## 2018-09-28 RX ORDER — HYDROMORPHONE HYDROCHLORIDE 2 MG/ML
1 INJECTION, SOLUTION INTRAMUSCULAR; INTRAVENOUS; SUBCUTANEOUS ONCE
Status: COMPLETED | OUTPATIENT
Start: 2018-09-28 | End: 2018-09-28

## 2018-09-28 RX ORDER — VANCOMYCIN HCL IN 5 % DEXTROSE 1.5G/250ML
15 PLASTIC BAG, INJECTION (ML) INTRAVENOUS
Status: COMPLETED | OUTPATIENT
Start: 2018-09-28 | End: 2018-09-28

## 2018-09-28 RX ORDER — VANCOMYCIN HCL IN 5 % DEXTROSE 1.5G/250ML
15 PLASTIC BAG, INJECTION (ML) INTRAVENOUS
Status: DISCONTINUED | OUTPATIENT
Start: 2018-09-28 | End: 2018-09-28 | Stop reason: DRUGHIGH

## 2018-09-28 RX ORDER — IBUPROFEN 200 MG
24 TABLET ORAL
Status: DISCONTINUED | OUTPATIENT
Start: 2018-09-28 | End: 2018-09-30 | Stop reason: HOSPADM

## 2018-09-28 RX ORDER — DEXTROSE MONOHYDRATE AND SODIUM CHLORIDE 5; .45 G/100ML; G/100ML
INJECTION, SOLUTION INTRAVENOUS CONTINUOUS
Status: ACTIVE | OUTPATIENT
Start: 2018-09-28 | End: 2018-09-29

## 2018-09-28 RX ORDER — TRAMADOL HYDROCHLORIDE 50 MG/1
50 TABLET ORAL EVERY 8 HOURS PRN
Status: DISCONTINUED | OUTPATIENT
Start: 2018-09-28 | End: 2018-09-29

## 2018-09-28 RX ADMIN — ISOSORBIDE MONONITRATE 30 MG: 30 TABLET, EXTENDED RELEASE ORAL at 09:09

## 2018-09-28 RX ADMIN — DEXTROSE MONOHYDRATE, SODIUM CHLORIDE, AND POTASSIUM CHLORIDE: 50; 4.5; 1.49 INJECTION, SOLUTION INTRAVENOUS at 09:09

## 2018-09-28 RX ADMIN — INSULIN DETEMIR 30 UNITS: 100 INJECTION, SOLUTION SUBCUTANEOUS at 09:09

## 2018-09-28 RX ADMIN — MORPHINE SULFATE 2 MG: 4 INJECTION INTRAVENOUS at 01:09

## 2018-09-28 RX ADMIN — AMLODIPINE BESYLATE 5 MG: 5 TABLET ORAL at 09:09

## 2018-09-28 RX ADMIN — RANOLAZINE 500 MG: 500 TABLET, FILM COATED, EXTENDED RELEASE ORAL at 11:09

## 2018-09-28 RX ADMIN — HYDROMORPHONE HYDROCHLORIDE 1 MG: 2 INJECTION INTRAMUSCULAR; INTRAVENOUS; SUBCUTANEOUS at 05:09

## 2018-09-28 RX ADMIN — INSULIN DETEMIR 10 UNITS: 100 INJECTION, SOLUTION SUBCUTANEOUS at 11:09

## 2018-09-28 RX ADMIN — METOPROLOL TARTRATE 100 MG: 50 TABLET ORAL at 09:09

## 2018-09-28 RX ADMIN — PIPERACILLIN AND TAZOBACTAM 4.5 G: 4; .5 INJECTION, POWDER, LYOPHILIZED, FOR SOLUTION INTRAVENOUS; PARENTERAL at 11:09

## 2018-09-28 RX ADMIN — PIPERACILLIN SODIUM AND TAZOBACTAM SODIUM 4.5 G: 4; .5 INJECTION, POWDER, LYOPHILIZED, FOR SOLUTION INTRAVENOUS at 08:09

## 2018-09-28 RX ADMIN — METOPROLOL TARTRATE 100 MG: 50 TABLET ORAL at 08:09

## 2018-09-28 RX ADMIN — CEFTRIAXONE 1 G: 1 INJECTION, SOLUTION INTRAVENOUS at 05:09

## 2018-09-28 RX ADMIN — DEXTROSE AND SODIUM CHLORIDE: 5; .45 INJECTION, SOLUTION INTRAVENOUS at 11:09

## 2018-09-28 RX ADMIN — RAMELTEON 8 MG: 8 TABLET, FILM COATED ORAL at 08:09

## 2018-09-28 RX ADMIN — TRAMADOL HYDROCHLORIDE 50 MG: 50 TABLET, FILM COATED ORAL at 09:09

## 2018-09-28 RX ADMIN — VANCOMYCIN HYDROCHLORIDE 1500 MG: 10 INJECTION, POWDER, LYOPHILIZED, FOR SOLUTION INTRAVENOUS at 06:09

## 2018-09-28 RX ADMIN — RANOLAZINE 500 MG: 500 TABLET, FILM COATED, EXTENDED RELEASE ORAL at 08:09

## 2018-09-28 RX ADMIN — FUROSEMIDE 20 MG: 20 TABLET ORAL at 09:09

## 2018-09-28 RX ADMIN — PANTOPRAZOLE SODIUM 40 MG: 40 TABLET, DELAYED RELEASE ORAL at 11:09

## 2018-09-28 NOTE — ASSESSMENT & PLAN NOTE
Acute On CKD  Pharmacy to renal dose vanc  Hold diuretic  Change IV fluids with K+ to without  IV hydration and monitor for improvement. Consider consult to renal pending course.   Hold ARB  Check A1C and may need better control of DM

## 2018-09-28 NOTE — ASSESSMENT & PLAN NOTE
Diff: bursitis/abscess of right elbow.  Ortho consulted in ER  Was unable to get CT with contrast due to kidney function. Check US as recommended per CT.   Vanc/Rocephin  Check uric acid.  Hx gout--?atypical presentation of gout  Caution at this time with NSAIDs

## 2018-09-28 NOTE — ED NOTES
Pt informed he will be admitted with ortho coming to see him later today.  Medicine paged for admit.  Dr Salazar spoke with Dr Perry with ortho regarding POC.

## 2018-09-28 NOTE — ASSESSMENT & PLAN NOTE
Check a1c  Ssi, long acting  Currently NPO, on gentle IV hydration with d5 1/2NS-->gaye   Diabetic educator consult  Optimization pending course

## 2018-09-28 NOTE — TELEPHONE ENCOUNTER
----- Message from Dorita Vogel sent at 9/28/2018 10:26 AM CDT -----  Contact: Layla  Pt had appt w/ Dr Gómez today, but pt has been seen in ER and received antibotoc intravenously for pain in elbow. Please call Layla at [phone].    Thanks,   Dorita Vogel

## 2018-09-28 NOTE — PROGRESS NOTES
Pharmacist Renal Dose Adjustment Note    Christiano Cox is a 58 y.o. male being treated with the medication Zosyn.     Patient Data:    Vital Signs (Most Recent):  Temp: 97.7 °F (36.5 °C) (09/28/18 1212)  Pulse: 66 (09/28/18 1212)  Resp: 18 (09/28/18 1212)  BP: 129/68 (09/28/18 1212)  SpO2: 98 % (09/28/18 1212) Vital Signs (72h Range):  Temp:  [97.6 °F (36.4 °C)-98.8 °F (37.1 °C)]   Pulse:  [66-78]   Resp:  [16-20]   BP: (123-155)/(68-85)   SpO2:  [96 %-100 %]      Recent Labs   Lab  09/28/18   0052  09/28/18   1037   CREATININE  2.7*  2.2*     Serum creatinine: 2.2 mg/dL (H) 09/28/18 1037  Estimated creatinine clearance: 46 mL/min (A)    Dose will be changed to 4.5 g IV Q8H.     Pharmacist's Name: Heike Blanca  Pharmacist's Extension: [phone]

## 2018-09-28 NOTE — CONSULTS
[facility] -   Orthopedics  Consult Note    Patient Name: Christiano Cox  MRN: [id number]  Admission Date: 9/27/2018  Hospital Length of Stay: 0 days  Attending Provider: Jesus Lin, *  Primary Care Provider: Ace Gómez MD    Patient information was obtained from patient and ER records.     Consults  Subjective:     Principal Problem:Cellulitis of right elbow    Chief Complaint:   Chief Complaint   Patient presents with    Elbow Pain     Right elbow redness and swelling.  Denies trauma.        HPI: Reports sub acute right elbow pain. Denies recent trauma, wound, or illness. Reates pain 7/10, associated with redness, swelling. Pain aggravated by movement. Denies sensory symptoms. RHD    Past Medical History:   Diagnosis Date    Acute coronary syndrome     Coronary artery disease involving native coronary artery without angina pectoris 9/13/2016    Coronary atherosclerosis of unspecified type of vessel, native or graft     s/p cabg and stents    DM (diabetes mellitus), type 2 with complications     History of gout     Hyperlipidemia associated with type 2 diabetes mellitus     Hypertension associated with diabetes     NSTEMI (non-ST elevated myocardial infarction) 5/16/2016    Obesity, unspecified     SOFIYA on CPAP     S/P CABG x 3     Type II diabetes mellitus with renal manifestations        Past Surgical History:   Procedure Laterality Date    BACK SURGERY      CARDIAC CATHETERIZATION      COLONOSCOPY N/A 7/3/2014    Performed [facility]ruff [facility] ENDO    CORONARY ANGIOPLASTY      CORONARY ARTERY BYPASS GRAFT         Review of patient's allergies indicates:   Allergen Reactions    No known drug allergies        Current Facility-Administered Medications   Medication    acetaminophen tablet 650 mg    amLODIPine tablet 5 mg    [START ON 9/29/2018] cefTRIAXone (ROCEPHIN) 1 g in dextrose 5 % 50 mL IVPB    dextrose 5 % and 0.45 % NaCl infusion    dextrose 50%  injection 12.5 g    dextrose 50% injection 25 g    glucagon (human recombinant) injection 1 mg    glucose chewable tablet 16 g    glucose chewable tablet 24 g    insulin aspart U-100 pen 0-5 Units    insulin detemir U-100 pen 10 Units    isosorbide mononitrate 24 hr tablet 30 mg    levothyroxine tablet 75 mcg    metoprolol tartrate (LOPRESSOR) tablet 100 mg    ondansetron injection 4 mg    pantoprazole EC tablet 40 mg    ranolazine 12 hr tablet 500 mg    sodium chloride 0.9% flush 5 mL    traMADol tablet 50 mg    [START ON 9/30/2018] vancomycin in dextrose 5 % 1 gram/250 mL IVPB 1,000 mg     Family History     Problem Relation (Age of Onset)    Diabetes Mother, Sister, Brother    Heart disease Brother    Hypertension Father, Brother    Stroke Sister        Tobacco Use    Smoking status: Never Smoker    Smokeless tobacco: Never Used   Substance and Sexual Activity    Alcohol use: No     Frequency: Never    Drug use: No    Sexual activity: Yes     Partners: Female     ROS  Objective:     Vital Signs (Most Recent):  Temp: 97.6 °F (36.4 °C) (09/28/18 0813)  Pulse: 77 (09/28/18 0813)  Resp: 16 (09/28/18 0813)  BP: (!) 155/82 (09/28/18 0813)  SpO2: 98 % (09/28/18 0813) Vital Signs (24h Range):  Temp:  [97.6 °F (36.4 °C)-98.8 °F (37.1 °C)] 97.6 °F (36.4 °C)  Pulse:  [68-78] 77  Resp:  [16-20] 16  SpO2:  [96 %-100 %] 98 %  BP: (123-155)/(76-85) 155/82     Weight: 108.3 kg (238 lb 12.1 oz)  Height: 6' (182.9 cm)  Body mass index is 32.38 kg/m².      Intake/Output Summary (Last 24 hours) at 9/28/2018 1055  Last data filed at 9/28/2018 1000  Gross per 24 hour   Intake 50 ml   Output --   Net 50 ml       Ortho/SPM Exam   Right elbow  Moderate diffuse edema.   Mild erythema  TTP about posterior elbow/olecranaon bursa  Passive/active ROM aggravates symptoms  At hand: FROM, motor/sensory/vascular WNL    Significant Labs: All pertinent labs within the past 24 hours have been reviewed.    Significant Imaging:  MRI/xray pending    Assessment/Plan: Right elbow cellulitis, current exam inconsistent with septic bursa or elbow joint.     1. IV abx  2. Ordered MRI/xray will re asses after studies reviewed  Active Diagnoses:    Diagnosis Date Noted POA    PRINCIPAL PROBLEM:  Cellulitis of right elbow [L03.113] 09/28/2018 Yes    TATY (acute kidney injury) [N17.9] 09/28/2018 Yes    Coronary artery disease involving native coronary artery without angina pectoris [I25.10] 09/13/2016 Yes    Hyperlipidemia associated with type 2 diabetes mellitus [E11.69, E78.5]  Yes    Hypertension associated with diabetes [E11.59, I10]  Yes    Type II diabetes mellitus with renal manifestations [E11.29]  Yes      Problems Resolved During this Admission:       Thank you for your consult. I will follow-up with patient. Please contact us if you have any additional questions.    Sergey Lehman MD  Orthopedics  [facility] -

## 2018-09-28 NOTE — PROGRESS NOTES
Patient seen and evaluated.    Right elbow.    Neurovascular intact in hand. No pain with supination and pronation of elbow. No pain with light range of motion. Has cellulitis and edema.    Extremely low suspicion of septic joint. No surgical intervention indicated. Recommend medical management. No ortho follow-up needed unless other concerns arise. Call with questions or concerns.

## 2018-09-28 NOTE — ED PROVIDER NOTES
SCRIBE #1 NOTE: I, Chaparro Carlos/Altagracia Molina, am scribing for, and in the presence of, Stanton Salazar MD. I have scribed the entire note.      History      Chief Complaint   Patient presents with    Elbow Pain     Right elbow redness and swelling.  Denies trauma.       Review of patient's allergies indicates:   Allergen Reactions    No known drug allergies         HPI   HPI    9/28/2018, 12:08 AM   History obtained from the patient      History of Present Illness: Christiano Cox is a 58 y.o. male patient with a PMHx of gout in bilat knees who presents to the Emergency Department for right elbow pain which onset gradually 4 days ago. Patient denies any recent injury/trauma. Symptoms are constant and moderate in severity. No mitigating factors reported. Pain is exacerbated with any movement or palpation of the joint. Associated sxs include increased warmth/erythema/swelling in the area. Pt reports being evaluated by his PCP Dr. Gómez on 9/25/18 for similar symptoms and received a 1g dose of Rocephin and was prescribed Augmentin (875-125 mg) PO. Patient reports sxs have worsened. Patient denies any fever, chills, drainage from R elbow, recent injection to area, R shoulder pain, R wrist pain, extremity weakness/numbness/tingling, N/V/D, and all other sxs at this time. No further complaints or concerns at this time.     Arrival mode: Personal vehicle      PCP: Ace Gómez MD       Past Medical History:  Past Medical History:   Diagnosis Date    Acute coronary syndrome     Coronary artery disease involving native coronary artery without angina pectoris 9/13/2016    Coronary atherosclerosis of unspecified type of vessel, native or graft     s/p cabg and stents    DM (diabetes mellitus), type 2 with complications     History of gout     Hyperlipidemia associated with type 2 diabetes mellitus     Hypertension associated with diabetes     NSTEMI (non-ST elevated myocardial infarction) 5/16/2016    Obesity,  unspecified     SFOIYA on CPAP     S/P CABG x 3     Type II diabetes mellitus with renal manifestations        Past Surgical History:  Past Surgical History:   Procedure Laterality Date    BACK SURGERY      CARDIAC CATHETERIZATION      COLONOSCOPY N/A 7/3/2014    Performed [facility]ruff [facility] at [facility]    CORONARY ANGIOPLASTY      CORONARY ARTERY BYPASS GRAFT           Family History:  Family History   Problem Relation Age of Onset    Diabetes Mother     Hypertension Father     Stroke Sister     Diabetes Sister     Heart disease Brother     Diabetes Brother     Hypertension Brother        Social History:  Social History     Tobacco Use    Smoking status: Never Smoker    Smokeless tobacco: Never Used   Substance and Sexual Activity    Alcohol use: No     Frequency: Never    Drug use: No    Sexual activity: Yes     Partners: Female       ROS   Review of Systems   Constitutional: Negative for chills and fever.   HENT: Negative for sore throat.    Respiratory: Negative for shortness of breath.    Cardiovascular: Negative for chest pain.   Gastrointestinal: Negative for diarrhea, nausea and vomiting.   Genitourinary: Negative for dysuria.   Musculoskeletal: Positive for joint swelling (R elbow). Negative for back pain.        (+) R elbow pain  (-) R shoulder pain, R wrist pain   Skin: Negative for rash.        (+) increased warmth and erythema to R elbow  (-) drainage from R elbow   Neurological: Negative for weakness and numbness.        (-) extremity tingling   Hematological: Does not bruise/bleed easily.       Physical Exam      Initial Vitals [09/27/18 2333]   BP Pulse Resp Temp SpO2   (!) 149/85 78 18 98.6 °F (37 °C) 98 %      MAP       --          Physical Exam  Nursing Notes and Vital Signs Reviewed.  Constitutional: Patient is in no acute distress. Well-developed and well-nourished.  Head: Atraumatic. Normocephalic.  Eyes: PERRL. EOM intact. Conjunctivae are not pale. No scleral  icterus.  ENT: Mucous membranes are moist. Oropharynx is clear and symmetric.    Neck: Supple. Full ROM. No lymphadenopathy.  Cardiovascular: Regular rate. Regular rhythm. No murmurs, rubs, or gallops. Distal pulses are 2+ and symmetric.  Pulmonary/Chest: No respiratory distress. Clear to auscultation bilaterally. No wheezing or rales.  Abdominal: Soft and non-distended.  There is no tenderness.  No rebound, guarding, or rigidity. Good bowel sounds.  Musculoskeletal: Moves all extremities. No edema. No calf tenderness.  RUE: has no evident deformity. Globally edematous and erythematous R elbow with markedly limited ROM secondary to pain and swelling. Positive for tenderness. Cap refill distally is <2 seconds. Radial pulses are equal and 2+ bilaterally. No distal sensory deficit.  Skin: Warm and dry.  Neurological:  Alert, awake, and appropriate.  Normal speech.  No acute focal neurological deficits are appreciated.  Psychiatric: Normal affect. Good eye contact. Appropriate in content.    ED Course    Procedures  ED Vital Signs:  Vitals:    09/27/18 2333 09/28/18 0314 09/28/18 0536   BP: (!) 149/85 (!) 140/76 138/83   Pulse: 78 68 73   Resp: 18 18 20   Temp: 98.6 °F (37 °C) 98.8 °F (37.1 °C) 98.2 °F (36.8 °C)   TempSrc: Oral Oral Oral   SpO2: 98% 100% 97%   Weight: 108.3 kg (238 lb 12.1 oz)     Height: 6' (1.829 m)         Abnormal Lab Results:  Labs Reviewed   CBC W/ AUTO DIFFERENTIAL - Abnormal; Notable for the following components:       Result Value    RBC 3.92 (*)     Hemoglobin 11.8 (*)     Hematocrit 32.6 (*)     MCHC 36.2 (*)     MPV 8.9 (*)     All other components within normal limits   COMPREHENSIVE METABOLIC PANEL - Abnormal; Notable for the following components:    CO2 19 (*)     Glucose 254 (*)     BUN, Bld 56 (*)     Creatinine 2.7 (*)     Albumin 3.3 (*)     Alkaline Phosphatase 39 (*)     AST 50 (*)     ALT 53 (*)     eGFR if  29 (*)     eGFR if non  25 (*)     All  other components within normal limits        All Lab Results:  Results for orders placed or performed during the hospital encounter of 09/27/18   CBC auto differential   Result Value Ref Range    WBC 6.27 3.90 - 12.70 K/uL    RBC 3.92 (L) 4.60 - 6.20 M/uL    Hemoglobin 11.8 (L) 14.0 - 18.0 g/dL    Hematocrit 32.6 (L) 40.0 - 54.0 %    MCV 83 82 - 98 fL    MCH 30.1 27.0 - 31.0 pg    MCHC 36.2 (H) 32.0 - 36.0 g/dL    RDW 13.5 11.5 - 14.5 %    Platelets 222 150 - 350 K/uL    MPV 8.9 (L) 9.2 - 12.9 fL    Gran # (ANC) 4.1 1.8 - 7.7 K/uL    Lymph # 1.2 1.0 - 4.8 K/uL    Mono # 0.5 0.3 - 1.0 K/uL    Eos # 0.4 0.0 - 0.5 K/uL    Baso # 0.03 0.00 - 0.20 K/uL    Gran% 65.6 38.0 - 73.0 %    Lymph% 19.8 18.0 - 48.0 %    Mono% 8.5 4.0 - 15.0 %    Eosinophil% 5.6 0.0 - 8.0 %    Basophil% 0.5 0.0 - 1.9 %    Differential Method Automated    Comprehensive metabolic panel   Result Value Ref Range    Sodium 136 136 - 145 mmol/L    Potassium 3.7 3.5 - 5.1 mmol/L    Chloride 103 95 - 110 mmol/L    CO2 19 (L) 23 - 29 mmol/L    Glucose 254 (H) 70 - 110 mg/dL    BUN, Bld 56 (H) 6 - 20 mg/dL    Creatinine 2.7 (H) 0.5 - 1.4 mg/dL    Calcium 9.4 8.7 - 10.5 mg/dL    Total Protein 7.3 6.0 - 8.4 g/dL    Albumin 3.3 (L) 3.5 - 5.2 g/dL    Total Bilirubin 0.3 0.1 - 1.0 mg/dL    Alkaline Phosphatase 39 (L) 55 - 135 U/L    AST 50 (H) 10 - 40 U/L    ALT 53 (H) 10 - 44 U/L    Anion Gap 14 8 - 16 mmol/L    eGFR if African American 29 (A) >60 mL/min/1.73 m^2    eGFR if non African American 25 (A) >60 mL/min/1.73 m^2       Imaging Results:  Imaging Results          CT Arm Elbow Without Contrast Right (In process)                Per Virtual radiology, pt's CT Right Upper Extremity Without Intravenous Contrast results:   1. Pt unable to raise arm and study performed with the arm down which contributes to significant streak artifact and limits detailed assessment of the soft tissues.   2. Approximately 6 cm focal induration overlying the right olecranon  process with surrounding stranding compatible with cellulitis/phlegmon. Evaluation for abscess is limited as described above and cannot be excluded. Consider targeted ultrasound for further assessment if clinically indicated.            The Emergency Provider reviewed the vital signs and test results, which are outlined above.    ED Discussion     5:08 AM: Re-evaluated pt. Pt is resting comfortably and is in no acute distress.  D/w pt all pertinent results. D/w pt any concerns expressed at this time. Answered all questions. Pt expresses understanding at this time. Ortho consult has been placed at this time.    5:33 AM: Dr. Salazar discussed the pt's case with Dr. Perry (Ortho) who recommends admission for IV abx and states he will evaluate patient later in the day.    5:45 AM: Re-evaluated pt. Pt notes modest improvement and tenderness othewrise unchanged. Discussed with patient ortho's recommendations. I have discussed test results, shared treatment plan, and the need for admission with patient and family at bedside. Pt and family express understanding at this time and agree with all information. All questions answered. Pt and family have no further questions or concerns at this time.  I have consulted with orthopedics. Pt is ready for admit.     6:08 AM: Discussed case with Dr. Mendoza (Hospital Medicine). Dr. Mendoza agrees with current care and management of pt and accepts admission.   Admitting Service: Hospital medicine   Admitting Physician: Dr. Mendoza  Admit to: Inpatient med-tele        ED Medication(s):  Medications   vancomycin 1.5 g in 5 % dextrose 250 mL IVPB (not administered)   morphine injection 2 mg (2 mg Intravenous Given 9/28/18 0139)   hydromorphone (PF) injection 1 mg (1 mg Intravenous Given 9/28/18 0522)   cefTRIAXone (ROCEPHIN) 1 g in dextrose 5 % 50 mL IVPB (1 g Intravenous New Bag 9/28/18 1910)             Medical Decision Making    Medical Decision Making:   Clinical Tests:   Lab Tests: Ordered and  Reviewed  Radiological Study: Ordered and Reviewed           Scribe Attestation:   Scribe #1: I performed the above scribed service and the documentation accurately describes the services I performed. I attest to the accuracy of the note.    Attending:   Physician Attestation Statement for Scribe #1: I, Stanton Salazar MD, personally performed the services described in this documentation, as scribed by Chaparro Carlos/Altagracia Molina, in my presence, and it is both accurate and complete.          Clinical Impression       ICD-10-CM ICD-9-CM   1. Cellulitis of right elbow L03.113 682.3   2. Type 2 diabetes mellitus with complication, with long-term current use of insulin E11.8 250.90    Z79.4 V58.67       Disposition:   Disposition: Admitted (Med-tele)  Condition: Fair         Stanton Salazar MD  09/28/18 0643

## 2018-09-28 NOTE — SUBJECTIVE & OBJECTIVE
Past Medical History:   Diagnosis Date    Acute coronary syndrome     Coronary artery disease involving native coronary artery without angina pectoris 9/13/2016    Coronary atherosclerosis of unspecified type of vessel, native or graft     s/p cabg and stents    DM (diabetes mellitus), type 2 with complications     History of gout     Hyperlipidemia associated with type 2 diabetes mellitus     Hypertension associated with diabetes     NSTEMI (non-ST elevated myocardial infarction) 5/16/2016    Obesity, unspecified     SOFIYA on CPAP     S/P CABG x 3     Type II diabetes mellitus with renal manifestations        Past Surgical History:   Procedure Laterality Date    BACK SURGERY      CARDIAC CATHETERIZATION      COLONOSCOPY N/A 7/3/2014    [facility]    CORONARY ANGIOPLASTY      CORONARY ARTERY BYPASS GRAFT         Review of patient's allergies indicates:   Allergen Reactions    No known drug allergies        No current facility-administered medications on file prior to encounter.      Current Outpatient Medications on File Prior to Encounter   Medication Sig    ACCU-CHEK MULTICLIX LANCET lancets TEST DAILY AS DIRECTED    amLODIPine (NORVASC) 5 MG tablet Take 1 tablet (5 mg total) by mouth once daily.    amoxicillin-clavulanate 875-125mg (AUGMENTIN) 875-125 mg per tablet Take 1 tablet by mouth every 12 (twelve) hours.    aspirin 81 MG Chew Take 81 mg by mouth once daily.    BRILINTA 90 mg tablet TAKE ONE TABLET BY MOUTH TWICE DAILY    fenofibrate micronized (LOFIBRA) 67 MG capsule TAKE ONE CAPSULE BY MOUTH DAILY WITH breakfast.    FREESTYLE LITE STRIPS Strp CHECK BLOOD SUGAR FOUR TIMES DAILY    furosemide (LASIX) 20 MG tablet TAKE ONE TABLET BY MOUTH DAILY    irbesartan-hydrochlorothiazide (AVALIDE) 300-12.5 mg per tablet Take 1 tablet by mouth once daily.    isosorbide mononitrate (IMDUR) 30 MG 24 hr tablet TAKE ONE TABLET BY MOUTH EVERY DAY     "levothyroxine (SYNTHROID) 75 MCG tablet Take 1 tablet (75 mcg total) by mouth once daily.    metoprolol tartrate (LOPRESSOR) 100 MG tablet TAKE ONE TABLET BY MOUTH TWICE DAILY    nitroGLYCERIN (NITROSTAT) 0.4 MG SL tablet PLACE ONE TABLET UNDER THE TONGUE EVERY FIVE MINUTES AS NEEDED FOR CHEST PAIN    RANEXA 500 mg Tb12 TAKE ONE TABLET BY MOUTH TWICE DAILY    rosuvastatin (CRESTOR) 40 MG Tab Take 40 mg by mouth nightly.    SURE COMFORT PEN NEEDLE 31 gauge x 5/16" Ndle AS DIRECTED    TOUJEO SOLOSTAR U-300 INSULIN 300 unit/mL (1.5 mL) InPn pen INJECT 60 UNITS SUBCUTANEOUSLY ONCE A DAY    tiZANidine (ZANAFLEX) 4 MG tablet TAKE ONE TABLET BY MOUTH EVERY 6 HOURS AS NEEDED    TRULICITY 1.5 mg/0.5 mL PnIj INJECT 1.5 MG SUBCUTANEOUSLY ONCE WEEKLY     Family History     Problem Relation (Age of Onset)    Diabetes Mother, Sister, Brother    Heart disease Brother    Hypertension Father, Brother    Stroke Sister        Tobacco Use    Smoking status: Never Smoker    Smokeless tobacco: Never Used   Substance and Sexual Activity    Alcohol use: No     Frequency: Never    Drug use: No    Sexual activity: Yes     Partners: Female     Review of Systems   Constitutional: Negative for chills, diaphoresis, fatigue and fever.   HENT: Negative for congestion, sore throat and voice change.    Eyes: Negative for photophobia and visual disturbance.   Respiratory: Negative for cough, shortness of breath, wheezing and stridor.    Cardiovascular: Negative for chest pain and leg swelling.   Gastrointestinal: Negative for abdominal distention, abdominal pain, constipation, diarrhea, nausea and vomiting.   Endocrine: Negative for polydipsia, polyphagia and polyuria.   Genitourinary: Negative for difficulty urinating, dysuria, flank pain, testicular pain and urgency.   Musculoskeletal: Positive for arthralgias and joint swelling. Negative for back pain, gait problem, neck pain and neck stiffness.   Skin: Negative for color change and " rash.   Allergic/Immunologic: Negative for immunocompromised state.   Neurological: Negative for dizziness, syncope, weakness, numbness and headaches.   Hematological: Does not bruise/bleed easily.   Psychiatric/Behavioral: Negative for agitation, behavioral problems and confusion.     Objective:     Vital Signs (Most Recent):  Temp: 97.6 °F (36.4 °C) (09/28/18 0813)  Pulse: 77 (09/28/18 0813)  Resp: 16 (09/28/18 0813)  BP: (!) 155/82 (09/28/18 0813)  SpO2: 98 % (09/28/18 0813) Vital Signs (24h Range):  Temp:  [97.6 °F (36.4 °C)-98.8 °F (37.1 °C)] 97.6 °F (36.4 °C)  Pulse:  [68-78] 77  Resp:  [16-20] 16  SpO2:  [96 %-100 %] 98 %  BP: (123-155)/(76-85) 155/82     Weight: 108.3 kg (238 lb 12.1 oz)  Body mass index is 32.38 kg/m².    Physical Exam   Constitutional: He is oriented to person, place, and time. He appears well-developed and well-nourished. No distress.   HENT:   Head: Normocephalic and atraumatic.   Nose: Nose normal.   Eyes: Conjunctivae and EOM are normal. Pupils are equal, round, and reactive to light. No scleral icterus.   Neck: Normal range of motion. Neck supple. No tracheal deviation present.   Cardiovascular: Normal rate, regular rhythm, normal heart sounds and intact distal pulses.   No murmur heard.  Pulmonary/Chest: Effort normal and breath sounds normal. No stridor. No respiratory distress. He has no wheezes. He has no rales.   Abdominal: Soft. Bowel sounds are normal. He exhibits no distension. There is no tenderness. There is no guarding.   Genitourinary:   Genitourinary Comments: Check urine     Musculoskeletal: He exhibits edema ( right elbow and below into forarm.) and tenderness ( elbow right). He exhibits no deformity.   Decreased ROM to right elbow and with pain     Neurological: He is alert and oriented to person, place, and time. No cranial nerve deficit.   Skin: Skin is warm and dry. Capillary refill takes less than 2 seconds. No rash noted. He is not diaphoretic.   Psychiatric: He  has a normal mood and affect. His behavior is normal. Judgment and thought content normal.   Nursing note and vitals reviewed.        CRANIAL NERVES     CN III, IV, VI   Pupils are equal, round, and reactive to light.  Extraocular motions are normal.        Significant Labs: All pertinent labs within the past 24 hours have been reviewed.  Results for orders placed or performed during the hospital encounter of 09/27/18   CBC auto differential   Result Value Ref Range    WBC 6.27 3.90 - 12.70 K/uL    RBC 3.92 (L) 4.60 - 6.20 M/uL    Hemoglobin 11.8 (L) 14.0 - 18.0 g/dL    Hematocrit 32.6 (L) 40.0 - 54.0 %    MCV 83 82 - 98 fL    MCH 30.1 27.0 - 31.0 pg    MCHC 36.2 (H) 32.0 - 36.0 g/dL    RDW 13.5 11.5 - 14.5 %    Platelets 222 150 - 350 K/uL    MPV 8.9 (L) 9.2 - 12.9 fL    Gran # (ANC) 4.1 1.8 - 7.7 K/uL    Lymph # 1.2 1.0 - 4.8 K/uL    Mono # 0.5 0.3 - 1.0 K/uL    Eos # 0.4 0.0 - 0.5 K/uL    Baso # 0.03 0.00 - 0.20 K/uL    Gran% 65.6 38.0 - 73.0 %    Lymph% 19.8 18.0 - 48.0 %    Mono% 8.5 4.0 - 15.0 %    Eosinophil% 5.6 0.0 - 8.0 %    Basophil% 0.5 0.0 - 1.9 %    Differential Method Automated    Comprehensive metabolic panel   Result Value Ref Range    Sodium 136 136 - 145 mmol/L    Potassium 3.7 3.5 - 5.1 mmol/L    Chloride 103 95 - 110 mmol/L    CO2 19 (L) 23 - 29 mmol/L    Glucose 254 (H) 70 - 110 mg/dL    BUN, Bld 56 (H) 6 - 20 mg/dL    Creatinine 2.7 (H) 0.5 - 1.4 mg/dL    Calcium 9.4 8.7 - 10.5 mg/dL    Total Protein 7.3 6.0 - 8.4 g/dL    Albumin 3.3 (L) 3.5 - 5.2 g/dL    Total Bilirubin 0.3 0.1 - 1.0 mg/dL    Alkaline Phosphatase 39 (L) 55 - 135 U/L    AST 50 (H) 10 - 40 U/L    ALT 53 (H) 10 - 44 U/L    Anion Gap 14 8 - 16 mmol/L    eGFR if African American 29 (A) >60 mL/min/1.73 m^2    eGFR if non African American 25 (A) >60 mL/min/1.73 m^2   VANCOMYCIN, TROUGH   Result Value Ref Range    Vancomycin-Trough 26.0 (H) 10.0 - 22.0 ug/mL       Significant Imaging: I have reviewed all pertinent imaging  results/findings within the past 24 hours.   Imaging Results          CT Arm Elbow Without Contrast Right (Final result)  Result time 09/28/18 08:23:43    Final result by Madhav Servin MD (09/28/18 08:23:43)                 Impression:      Posterior elbow cellulitis versus olecranon bursitis.  An abscess is not excluded.  Recommend ultrasound.      Electronically signed by: Madhav Servin MD  Date:    09/28/2018  Time:    08:23             Narrative:    EXAMINATION:  CT ARM ELBOW WITHOUT CONTRAST RIGHT    CLINICAL HISTORY:  Elbow erythema, swelling, cellulitis suspected;    TECHNIQUE:  Axial CT imaging was performed through the right elbow without intravenous contrast.  Multiplanar reformats were performed and interpreted.    COMPARISON:  Right elbow CT on 05/17/2016    FINDINGS:  The evaluation is limited because the patient was unable to raise the right arm over the head to avoid attenuation artifact.  Therefore, image quality is poor.    Subcutaneous induration posterior to the olecranon.  Cannot exclude a fluid collection in this region.  Recommend ultrasound evaluation.

## 2018-09-28 NOTE — ED NOTES
LOC: The patient is awake, alert and aware of environment with an appropriate affect, the patient is oriented x 3 and speaking appropriately.  APPEARANCE: Patient resting comfortably and in no acute distress, patient is clean and well groomed  SKIN: The skin is warm and dry, color consistent with ethnicity, patient has normal skin turgor and moist mucus membranes.  MUSCULOSKELETAL: Patient moving all extremities to command. Right lower arm swelling and redness for 3 days, denies bite, trauma or injury. States seen by PCP earlier this week with antibiotics given but cont to get worse.  Right forearm hot to touch with redness and swelling. Painful to palpation. Strong radial pulse noted. Elevated on pillow for comfort.   RESPIRATORY: Airway is open and patent, respirations are regular, even and non labored.  CARDIAC: Patient has a normal rate, no periphreal edema noted, capillary refill < 3 seconds.  ABDOMEN: Soft and non tender to palpation.  Wife at bedside, SR up x 1, call light in reach.

## 2018-09-28 NOTE — ASSESSMENT & PLAN NOTE
Continue ranexa  Hold anticoag/antiplat. As may need intervetion , ortho consulted  Hold statin for now  Monitor   Currently no cardiac complaints

## 2018-09-28 NOTE — PLAN OF CARE
Sw met with pt at bedside to complete assessment. No family present at time of visit. Sw explained role/purpose of visit. Transitional navigator was provided to pt. Sw placed contact information on white board. Pt denies any discharge needs at this time.        09/28/18 1015   Discharge Assessment   Assessment Type Discharge Planning Assessment   Confirmed/corrected address and phone number on facesheet? Yes   Assessment information obtained from? Patient   Communicated expected length of stay with patient/caregiver yes   Prior to hospitilization cognitive status: Alert/Oriented   Prior to hospitalization functional status: Independent   Current cognitive status: Alert/Oriented   Current Functional Status: Independent   Facility Arrived From: Home   Lives With spouse   Able to Return to Prior Arrangements yes   Is patient able to care for self after discharge? Yes   Who are your caregiver(s) and their phone number(s)? Layla Cox, spouse, [phone]   Patient's perception of discharge disposition home or selfcare   Readmission Within The Last 30 Days no previous admission in last 30 days   Patient currently being followed by outpatient case management? No   Patient currently receives any other outside agency services? No   Equipment Currently Used at Home none   Do you have any problems affording any of your prescribed medications? No   Is the patient taking medications as prescribed? yes   Does the patient have transportation home? Yes   Transportation Available family or friend will provide  (Spouse will provide dc transportation.)   Does the patient receive services at the [facility]? No   Discharge Plan A Home with family   Discharge Plan B Home with family   Patient/Family In Agreement With Plan yes

## 2018-09-28 NOTE — H&P
[facility] Medicine  History & Physical    Patient Name: Christiano Cox  MRN: [id number]  Admission Date: 9/27/2018  Attending Physician: Jesus Lin, *   Primary Care Provider: Ace Gómez MD         Patient information was obtained from patient, past medical records and ER records.     Subjective:     Principal Problem:Cellulitis of right elbow    Chief Complaint:   Chief Complaint   Patient presents with    Elbow Pain     Right elbow redness and swelling.  Denies trauma.        HPI: Christiano Cox is a 58 y.o. male patient with a PMHx of gout, DM on insulin, HTN who presents for right elbow pain which onset about 4 days ago.   Modifying factor: Pain is exacerbated with any movement or palpation of the joint. Associated sxs include increased warmth/erythema/swelling in the area. Pt reports being evaluated by his PCP Dr. Gómez on 9/25/18 for similar symptoms and received a 1g dose of Rocephin and was prescribed Augmentin (875-125 mg) PO  with no improvement, andreports sxs have worsened. Patient denies any fever, chills, drainage from R elbow, recent injection to area, R shoulder pain, R wrist pain, extremity weakness/numbness/tingling, N/V/D, any recent injury/trauma to affected area. and all other sxs at this time. No further complaints or concerns at this time.  Patient was evaluated in the ER. WBC neg. Cr 2.7, BUN 56. (4 months ago Cr about 2). K 3.9. CT without contrast of right arm elbow Posterior elbow cellulitis versus olecranon bursitis.  An abscess is not excluded.  Recommend ultrasound. Patient started on IV rocephin and vanc. Ortho consulted who plan to see.  HM consulted for admission. Patient admitted.         Past Medical History:   Diagnosis Date    Acute coronary syndrome     Coronary artery disease involving native coronary artery without angina pectoris 9/13/2016    Coronary atherosclerosis of unspecified type of vessel, native or graft     s/p cabg  and stents    DM (diabetes mellitus), type 2 with complications     History of gout     Hyperlipidemia associated with type 2 diabetes mellitus     Hypertension associated with diabetes     NSTEMI (non-ST elevated myocardial infarction) 5/16/2016    Obesity, unspecified     SOFIYA on CPAP     S/P CABG x 3     Type II diabetes mellitus with renal manifestations        Past Surgical History:   Procedure Laterality Date    BACK SURGERY      CARDIAC CATHETERIZATION      COLONOSCOPY N/A 7/3/2014    Performed by Pipo Woodruff [facility] at [facility]    CORONARY ANGIOPLASTY      CORONARY ARTERY BYPASS GRAFT         Review of patient's allergies indicates:   Allergen Reactions    No known drug allergies        No current facility-administered medications on file prior to encounter.      Current Outpatient Medications on File Prior to Encounter   Medication Sig    ACCU-CHEK MULTICLIX LANCET lancets TEST DAILY AS DIRECTED    amLODIPine (NORVASC) 5 MG tablet Take 1 tablet (5 mg total) by mouth once daily.    amoxicillin-clavulanate 875-125mg (AUGMENTIN) 875-125 mg per tablet Take 1 tablet by mouth every 12 (twelve) hours.    aspirin 81 MG Chew Take 81 mg by mouth once daily.    BRILINTA 90 mg tablet TAKE ONE TABLET BY MOUTH TWICE DAILY    fenofibrate micronized (LOFIBRA) 67 MG capsule TAKE ONE CAPSULE BY MOUTH DAILY WITH breakfast.    FREESTYLE LITE STRIPS Strp CHECK BLOOD SUGAR FOUR TIMES DAILY    furosemide (LASIX) 20 MG tablet TAKE ONE TABLET BY MOUTH DAILY    irbesartan-hydrochlorothiazide (AVALIDE) 300-12.5 mg per tablet Take 1 tablet by mouth once daily.    isosorbide mononitrate (IMDUR) 30 MG 24 hr tablet TAKE ONE TABLET BY MOUTH EVERY DAY    levothyroxine (SYNTHROID) 75 MCG tablet Take 1 tablet (75 mcg total) by mouth once daily.    metoprolol tartrate (LOPRESSOR) 100 MG tablet TAKE ONE TABLET BY MOUTH TWICE DAILY    nitroGLYCERIN (NITROSTAT) 0.4 MG SL tablet PLACE ONE TABLET UNDER THE TONGUE  "EVERY FIVE MINUTES AS NEEDED FOR CHEST PAIN    RANEXA 500 mg Tb12 TAKE ONE TABLET BY MOUTH TWICE DAILY    rosuvastatin (CRESTOR) 40 MG Tab Take 40 mg by mouth nightly.    SURE COMFORT PEN NEEDLE 31 gauge x 5/16" Ndle AS DIRECTED    TOUJEO SOLOSTAR U-300 INSULIN 300 unit/mL (1.5 mL) InPn pen INJECT 60 UNITS SUBCUTANEOUSLY ONCE A DAY    tiZANidine (ZANAFLEX) 4 MG tablet TAKE ONE TABLET BY MOUTH EVERY 6 HOURS AS NEEDED    TRULICITY 1.5 mg/0.5 mL PnIj INJECT 1.5 MG SUBCUTANEOUSLY ONCE WEEKLY     Family History     Problem Relation (Age of Onset)    Diabetes Mother, Sister, Brother    Heart disease Brother    Hypertension Father, Brother    Stroke Sister        Tobacco Use    Smoking status: Never Smoker    Smokeless tobacco: Never Used   Substance and Sexual Activity    Alcohol use: No     Frequency: Never    Drug use: No    Sexual activity: Yes     Partners: Female     *I have personally reviewed the patients allergies, medical, surgical, family and social history as listed above.      Review of Systems   Constitutional: Negative for chills, diaphoresis, fatigue and fever.   HENT: Negative for congestion, sore throat and voice change.    Eyes: Negative for photophobia and visual disturbance.   Respiratory: Negative for cough, shortness of breath, wheezing and stridor.    Cardiovascular: Negative for chest pain and leg swelling.   Gastrointestinal: Negative for abdominal distention, abdominal pain, constipation, diarrhea, nausea and vomiting.   Endocrine: Negative for polydipsia, polyphagia and polyuria.   Genitourinary: Negative for difficulty urinating, dysuria, flank pain, testicular pain and urgency.   Musculoskeletal: Positive for arthralgias and joint swelling. Negative for back pain, gait problem, neck pain and neck stiffness.   Skin: Negative for color change and rash.   Allergic/Immunologic: Negative for immunocompromised state.   Neurological: Negative for dizziness, syncope, weakness, numbness and " headaches.   Hematological: Does not bruise/bleed easily.   Psychiatric/Behavioral: Negative for agitation, behavioral problems and confusion.     Objective:     Vital Signs (Most Recent):  Temp: 97.6 °F (36.4 °C) (09/28/18 0813)  Pulse: 77 (09/28/18 0813)  Resp: 16 (09/28/18 0813)  BP: (!) 155/82 (09/28/18 0813)  SpO2: 98 % (09/28/18 0813) Vital Signs (24h Range):  Temp:  [97.6 °F (36.4 °C)-98.8 °F (37.1 °C)] 97.6 °F (36.4 °C)  Pulse:  [68-78] 77  Resp:  [16-20] 16  SpO2:  [96 %-100 %] 98 %  BP: (123-155)/(76-85) 155/82     Weight: 108.3 kg (238 lb 12.1 oz)  Body mass index is 32.38 kg/m².    Physical Exam   Constitutional: He is oriented to person, place, and time. He appears well-developed and well-nourished. No distress.   HENT:   Head: Normocephalic and atraumatic.   Nose: Nose normal.   Eyes: Conjunctivae and EOM are normal. Pupils are equal, round, and reactive to light. No scleral icterus.   Neck: Normal range of motion. Neck supple. No tracheal deviation present.   Cardiovascular: Normal rate, regular rhythm, normal heart sounds and intact distal pulses.   No murmur heard.  Pulmonary/Chest: Effort normal and breath sounds normal. No stridor. No respiratory distress. He has no wheezes. He has no rales.   Abdominal: Soft. Bowel sounds are normal. He exhibits no distension. There is no tenderness. There is no guarding.   Genitourinary:   Genitourinary Comments: Check urine     Musculoskeletal: He exhibits edema ( right elbow and below into forarm.) and tenderness ( elbow right). He exhibits no deformity.   Decreased ROM to right elbow and with pain     Neurological: He is alert and oriented to person, place, and time. No cranial nerve deficit.   Skin: Skin is warm and dry. Capillary refill takes less than 2 seconds. No rash noted. He is not diaphoretic.   Psychiatric: He has a normal mood and affect. His behavior is normal. Judgment and thought content normal.   Nursing note and vitals reviewed.        CRANIAL  NERVES     CN III, IV, VI   Pupils are equal, round, and reactive to light.  Extraocular motions are normal.        Significant Labs: All pertinent labs within the past 24 hours have been reviewed.  Results for orders placed or performed during the hospital encounter of 09/27/18   CBC auto differential   Result Value Ref Range    WBC 6.27 3.90 - 12.70 K/uL    RBC 3.92 (L) 4.60 - 6.20 M/uL    Hemoglobin 11.8 (L) 14.0 - 18.0 g/dL    Hematocrit 32.6 (L) 40.0 - 54.0 %    MCV 83 82 - 98 fL    MCH 30.1 27.0 - 31.0 pg    MCHC 36.2 (H) 32.0 - 36.0 g/dL    RDW 13.5 11.5 - 14.5 %    Platelets 222 150 - 350 K/uL    MPV 8.9 (L) 9.2 - 12.9 fL    Gran # (ANC) 4.1 1.8 - 7.7 K/uL    Lymph # 1.2 1.0 - 4.8 K/uL    Mono # 0.5 0.3 - 1.0 K/uL    Eos # 0.4 0.0 - 0.5 K/uL    Baso # 0.03 0.00 - 0.20 K/uL    Gran% 65.6 38.0 - 73.0 %    Lymph% 19.8 18.0 - 48.0 %    Mono% 8.5 4.0 - 15.0 %    Eosinophil% 5.6 0.0 - 8.0 %    Basophil% 0.5 0.0 - 1.9 %    Differential Method Automated    Comprehensive metabolic panel   Result Value Ref Range    Sodium 136 136 - 145 mmol/L    Potassium 3.7 3.5 - 5.1 mmol/L    Chloride 103 95 - 110 mmol/L    CO2 19 (L) 23 - 29 mmol/L    Glucose 254 (H) 70 - 110 mg/dL    BUN, Bld 56 (H) 6 - 20 mg/dL    Creatinine 2.7 (H) 0.5 - 1.4 mg/dL    Calcium 9.4 8.7 - 10.5 mg/dL    Total Protein 7.3 6.0 - 8.4 g/dL    Albumin 3.3 (L) 3.5 - 5.2 g/dL    Total Bilirubin 0.3 0.1 - 1.0 mg/dL    Alkaline Phosphatase 39 (L) 55 - 135 U/L    AST 50 (H) 10 - 40 U/L    ALT 53 (H) 10 - 44 U/L    Anion Gap 14 8 - 16 mmol/L    eGFR if African American 29 (A) >60 mL/min/1.73 m^2    eGFR if non African American 25 (A) >60 mL/min/1.73 m^2   VANCOMYCIN, TROUGH   Result Value Ref Range    Vancomycin-Trough 26.0 (H) 10.0 - 22.0 ug/mL       Significant Imaging: I have reviewed all pertinent imaging results/findings within the past 24 hours.   Imaging Results          CT Arm Elbow Without Contrast Right (Final result)  Result time 09/28/18 08:23:43     Final result by Madhav Servin MD (09/28/18 08:23:43)                 Impression:      Posterior elbow cellulitis versus olecranon bursitis.  An abscess is not excluded.  Recommend ultrasound.      Electronically signed by: Madhav Servin MD  Date:    09/28/2018  Time:    08:23             Narrative:    EXAMINATION:  CT ARM ELBOW WITHOUT CONTRAST RIGHT    CLINICAL HISTORY:  Elbow erythema, swelling, cellulitis suspected;    TECHNIQUE:  Axial CT imaging was performed through the right elbow without intravenous contrast.  Multiplanar reformats were performed and interpreted.    COMPARISON:  Right elbow CT on 05/17/2016    FINDINGS:  The evaluation is limited because the patient was unable to raise the right arm over the head to avoid attenuation artifact.  Therefore, image quality is poor.    Subcutaneous induration posterior to the olecranon.  Cannot exclude a fluid collection in this region.  Recommend ultrasound evaluation.                              I have personally reviewed the patients labs, imaging, and discussed the patient case in detail with the Er provider      Assessment/Plan:     * Cellulitis of right elbow    Diff: bursitis/abscess of right elbow.  Ortho consulted in ER  Was unable to get CT with contrast due to kidney function. Check US as recommended per CT.   Vanc/Zoysn  Check uric acid.  Hx gout--?atypical presentation of gout  Caution at this time with NSAIDs         TATY (acute kidney injury)    Acute On CKD  Pharmacy to renal dose vanc  Hold diuretic  Change IV fluids with K+ to without  IV hydration and monitor for improvement. Consider consult to renal pending course.   Hold ARB  Check A1C and may need better control of DM        Coronary artery disease involving native coronary artery without angina pectoris    Continue ranexa  Hold anticoag/antiplat. As may need intervetion , ortho consulted  Hold statin for now  Monitor   Currently no cardiac complaints          Hyperlipidemia associated with  type 2 diabetes mellitus    Check lipids, a1c  Hold statin at this time, mild elevation in LFT with TATY            Hypertension associated with diabetes    Hold ARB due to TATY  Continue CCB, BB  Monitor trends and PRN as need        Type II diabetes mellitus with renal manifestations    Check a1c  Ssi, long acting  Currently NPO, on gentle IV hydration with d5 1/2NS-->montior   Diabetic educator consult  Optimization pending course          VTE Risk Mitigation (From admission, onward)        Ordered     IP VTE HIGH RISK PATIENT  Once      09/28/18 0806     Place MARLON hose  Until discontinued      09/28/18 0806     Place sequential compression device  Until discontinued      09/28/18 0806     Reason for No Pharmacological VTE Prophylaxis  Once      09/28/18 0806             Mendoza Sandoval NP  Department of Hospital Medicine   [facility]

## 2018-09-28 NOTE — ED NOTES
Pt states wants HL removed and would like to leave.  States feels like he still doesn't know anything from when he came in. Pt feeling frustrated and still no relief from pain for his arm.  Dr Salazar aware of pt complaints.

## 2018-09-28 NOTE — HPI
Christiano Cox is a 58 y.o. male patient with a PMHx of gout, DM on insulin, HTN who presents for right elbow pain which onset about 4 days ago.  Modifying factor: Pain is exacerbated with any movement or palpation of the joint. Associated sxs include increased warmth/erythema/swelling in the area. Pt reports being evaluated by his PCP Dr. Gómez on 9/25/18 for similar symptoms and received a 1g dose of Rocephin and was prescribed Augmentin (875-125 mg) PO with no improvement, andreports sxs have worsened. Patient denies any fever, chills, drainage from R elbow, recent injection to area, R shoulder pain, R wrist pain, extremity weakness/numbness/tingling, N/V/D, any recent injury/trauma to affected area. and all other sxs at this time. No further complaints or concerns at this time. Patient was evaluated in the ER. WBC neg. Cr 2.7, BUN 56. (4 months ago Cr about 2). K 3.9. CT without contrast of right arm elbow Posterior elbow cellulitis versus olecranon bursitis.  An abscess is not excluded.  Recommend ultrasound. Patient started on IV rocephin and vanc. Ortho consulted who plan to see.  HM consulted for admission. Patient admitted.

## 2018-09-28 NOTE — CONSULTS
Consult received    Chart reviewed   Patient assessed  He reports was diagnosed over 10 years ago and has attended diabetes education class in the past  He has a home glucose monitor and checks 1-3 times daily   Reports averages are   He reports is consistent with administering his insulin    He uses the insulin pen method of insulin delivery    He reports proper insulin storage and site selection/rotation  He has good recall on teach back but reinforced info on target glucose values, hyper, hypoglycemia   Reviewed Type 2 diabetes management  He verbalizes understanding     Does not need literature, uses the Internet to research diabetes info

## 2018-09-28 NOTE — PLAN OF CARE
Problem: Patient Care Overview  Goal: Plan of Care Review  Outcome: Ongoing (interventions implemented as appropriate)    Reviewed plan of care, including indications and possible side effects of administered medications. Remains free from injury at this time. Respirations even and unlabored. Bed locked and in lowest position. Call light within reach. Side rails up x2. Pt has no complaints of pain. IV fluids running. RUE warm and tender. Pt ambulated in room independently. Telemetry monitor in place. Pt running NSR with PVCs. Chart check complete.

## 2018-09-28 NOTE — SUBJECTIVE & OBJECTIVE
"ORTHO    Principal Problem: right elbow pain  Principal Orthopedic Problem: right elbow cellulitis    Interval History: patient laying in bed, states pain/swelling/burning in right elbow has improved slightly since seen last night by Dr. Perry. Able to move elbow some but he does complain of pain with movement at elbow  Denies shooting arm pain, denies arm numb or tingling, denies fever, night sweats, chills overnight  Pain currently well controlled, currently NPO if surgery needed, on IV ABX, no acute overnight events  AAOx3, NAD, VSS, afebrile, NVI RUE    Review of patient's allergies indicates:   Allergen Reactions    No known drug allergies        Current Facility-Administered Medications   Medication    amLODIPine tablet 5 mg    [START ON 9/29/2018] cefTRIAXone (ROCEPHIN) 1 g in dextrose 5 % 50 mL IVPB    dextrose 5 % and 0.45 % NaCl with KCl 20 mEq infusion    dextrose 50% injection 12.5 g    furosemide tablet 20 mg    glucagon (human recombinant) injection 1 mg    insulin aspart U-100 pen 0-5 Units    isosorbide mononitrate 24 hr tablet 30 mg    levothyroxine tablet 75 mcg    metoprolol tartrate (LOPRESSOR) tablet 100 mg    rosuvastatin tablet 40 mg    vancomycin 1.5 g in 5 % dextrose 250 mL IVPB     Objective:     Vital Signs (Most Recent):  Temp: 97.6 °F (36.4 °C) (09/28/18 0813)  Pulse: 77 (09/28/18 0813)  Resp: 16 (09/28/18 0813)  BP: (!) 155/82 (09/28/18 0813)  SpO2: 98 % (09/28/18 0813) Vital Signs (24h Range):  Temp:  [97.6 °F (36.4 °C)-98.8 °F (37.1 °C)] 97.6 °F (36.4 °C)  Pulse:  [68-78] 77  Resp:  [16-20] 16  SpO2:  [96 %-100 %] 98 %  BP: (123-155)/(76-85) 155/82     Weight: 108.3 kg (238 lb 12.1 oz)  Height: 6' (182.9 cm)  Body mass index is 32.38 kg/m².      Intake/Output Summary (Last 24 hours) at 9/28/2018 0920  Last data filed at 9/28/2018 0556  Gross per 24 hour   Intake 50 ml   Output --   Net 50 ml       Ortho/SPM Exam    Significant Labs: {Results:27817::"All pertinent labs " "within the past 24 hours have been reviewed."}    Significant Imaging: I have reviewed all pertinent imaging results/findings.     NVI RUE  All compartments soft and compressible  Palpable 2+ radial pulse bilaterally  5/5 wrist flex/ext, finger flex/ext, interos, thumb abd  Limited ROM flex/ext right elbow - lacking 15-20 degrees ext, flex to 90 degrees  Able to fully supinate and pronate at elbow  TTP med>lat epicondyle  Mild erythema noted to elbow joint - improved  No fluid or swelling noted over olecranon, no erythema  Sensation intact rad/med/ulnar      Assessment  Right elbow cellulitis    Plan  1. Continue IV ABX per med recs  2. Monitor for improvement in ROM right elbow with flex/ext  3. Likely Dx of cellulitis at this time, will continue to monitor for any signs/symptoms of joint involvement  4. Can remove NPO status, no surgery indicated at this time  5. Pain control  6. NV checks      Frandy Mancini PA-C  "

## 2018-09-28 NOTE — TELEPHONE ENCOUNTER
Spoke with Layla.  She reports that pt is in the ER receiving IV antibiotics.  Cancelled pt's appointment for today.

## 2018-09-28 NOTE — ED NOTES
Spoke with pt and explained Dr Salazar with another pt - POC explained to pt at this time. Pt states will wait to speak with Dr Salazar.

## 2018-09-29 LAB
ALBUMIN SERPL BCP-MCNC: 2.9 G/DL
ALP SERPL-CCNC: 37 U/L
ALT SERPL W/O P-5'-P-CCNC: 46 U/L
ANION GAP SERPL CALC-SCNC: 10 MMOL/L
AST SERPL-CCNC: 37 U/L
BASOPHILS # BLD AUTO: 0.02 K/UL
BASOPHILS NFR BLD: 0.3 %
BILIRUB SERPL-MCNC: 0.4 MG/DL
BUN SERPL-MCNC: 32 MG/DL
CALCIUM SERPL-MCNC: 9.5 MG/DL
CHLORIDE SERPL-SCNC: 105 MMOL/L
CO2 SERPL-SCNC: 24 MMOL/L
CREAT SERPL-MCNC: 2 MG/DL
DIFFERENTIAL METHOD: ABNORMAL
EOSINOPHIL # BLD AUTO: 0.4 K/UL
EOSINOPHIL NFR BLD: 5.2 %
ERYTHROCYTE [DISTWIDTH] IN BLOOD BY AUTOMATED COUNT: 13.6 %
EST. GFR  (AFRICAN AMERICAN): 41 ML/MIN/1.73 M^2
EST. GFR  (NON AFRICAN AMERICAN): 35 ML/MIN/1.73 M^2
GLUCOSE SERPL-MCNC: 95 MG/DL
HCT VFR BLD AUTO: 31.6 %
HGB BLD-MCNC: 11 G/DL
LYMPHOCYTES # BLD AUTO: 1.4 K/UL
LYMPHOCYTES NFR BLD: 19.6 %
MCH RBC QN AUTO: 29.3 PG
MCHC RBC AUTO-ENTMCNC: 34.8 G/DL
MCV RBC AUTO: 84 FL
MONOCYTES # BLD AUTO: 1 K/UL
MONOCYTES NFR BLD: 13.5 %
NEUTROPHILS # BLD AUTO: 4.4 K/UL
NEUTROPHILS NFR BLD: 61.4 %
PLATELET # BLD AUTO: 220 K/UL
PMV BLD AUTO: 8.6 FL
POCT GLUCOSE: 129 MG/DL (ref 70–110)
POCT GLUCOSE: 140 MG/DL (ref 70–110)
POCT GLUCOSE: 151 MG/DL (ref 70–110)
POCT GLUCOSE: 86 MG/DL (ref 70–110)
POTASSIUM SERPL-SCNC: 3.7 MMOL/L
PROT SERPL-MCNC: 6.6 G/DL
RBC # BLD AUTO: 3.76 M/UL
SODIUM SERPL-SCNC: 139 MMOL/L
VANCOMYCIN SERPL-MCNC: 6.1 UG/ML
WBC # BLD AUTO: 7.1 K/UL

## 2018-09-29 PROCEDURE — 11000001 HC ACUTE MED/SURG PRIVATE ROOM

## 2018-09-29 PROCEDURE — 85025 COMPLETE CBC W/AUTO DIFF WBC: CPT

## 2018-09-29 PROCEDURE — 25000003 PHARM REV CODE 250: Performed by: NURSE PRACTITIONER

## 2018-09-29 PROCEDURE — 25000003 PHARM REV CODE 250: Performed by: EMERGENCY MEDICINE

## 2018-09-29 PROCEDURE — 25000003 PHARM REV CODE 250: Performed by: INTERNAL MEDICINE

## 2018-09-29 PROCEDURE — 21400001 HC TELEMETRY ROOM

## 2018-09-29 PROCEDURE — 94799 UNLISTED PULMONARY SVC/PX: CPT

## 2018-09-29 PROCEDURE — 36415 COLL VENOUS BLD VENIPUNCTURE: CPT

## 2018-09-29 PROCEDURE — 80202 ASSAY OF VANCOMYCIN: CPT

## 2018-09-29 PROCEDURE — 63600175 PHARM REV CODE 636 W HCPCS: Performed by: INTERNAL MEDICINE

## 2018-09-29 PROCEDURE — 80053 COMPREHEN METABOLIC PANEL: CPT

## 2018-09-29 RX ORDER — VANCOMYCIN HCL IN 5 % DEXTROSE 1G/250ML
1000 PLASTIC BAG, INJECTION (ML) INTRAVENOUS
Status: DISCONTINUED | OUTPATIENT
Start: 2018-09-30 | End: 2018-09-30 | Stop reason: HOSPADM

## 2018-09-29 RX ORDER — VANCOMYCIN HCL IN 5 % DEXTROSE 1G/250ML
1000 PLASTIC BAG, INJECTION (ML) INTRAVENOUS
Status: DISCONTINUED | OUTPATIENT
Start: 2018-09-29 | End: 2018-09-29

## 2018-09-29 RX ORDER — HYDROCODONE BITARTRATE AND ACETAMINOPHEN 5; 325 MG/1; MG/1
1 TABLET ORAL EVERY 4 HOURS PRN
Status: DISCONTINUED | OUTPATIENT
Start: 2018-09-29 | End: 2018-09-29

## 2018-09-29 RX ORDER — HYDROCODONE BITARTRATE AND ACETAMINOPHEN 10; 325 MG/1; MG/1
1 TABLET ORAL EVERY 4 HOURS PRN
Status: DISCONTINUED | OUTPATIENT
Start: 2018-09-29 | End: 2018-09-29

## 2018-09-29 RX ORDER — OXYCODONE HYDROCHLORIDE 5 MG/1
5 TABLET ORAL EVERY 4 HOURS PRN
Status: DISCONTINUED | OUTPATIENT
Start: 2018-09-29 | End: 2018-09-30 | Stop reason: HOSPADM

## 2018-09-29 RX ORDER — OXYCODONE HYDROCHLORIDE 5 MG/1
10 TABLET ORAL EVERY 4 HOURS PRN
Status: DISCONTINUED | OUTPATIENT
Start: 2018-09-29 | End: 2018-09-30 | Stop reason: HOSPADM

## 2018-09-29 RX ADMIN — RAMELTEON 8 MG: 8 TABLET, FILM COATED ORAL at 09:09

## 2018-09-29 RX ADMIN — VANCOMYCIN HYDROCHLORIDE 1000 MG: 1 INJECTION, POWDER, LYOPHILIZED, FOR SOLUTION INTRAVENOUS at 10:09

## 2018-09-29 RX ADMIN — LEVOTHYROXINE SODIUM 75 MCG: 25 TABLET ORAL at 05:09

## 2018-09-29 RX ADMIN — PIPERACILLIN SODIUM AND TAZOBACTAM SODIUM 4.5 G: 4; .5 INJECTION, POWDER, LYOPHILIZED, FOR SOLUTION INTRAVENOUS at 09:09

## 2018-09-29 RX ADMIN — RANOLAZINE 500 MG: 500 TABLET, FILM COATED, EXTENDED RELEASE ORAL at 08:09

## 2018-09-29 RX ADMIN — PIPERACILLIN SODIUM AND TAZOBACTAM SODIUM 4.5 G: 4; .5 INJECTION, POWDER, LYOPHILIZED, FOR SOLUTION INTRAVENOUS at 03:09

## 2018-09-29 RX ADMIN — OXYCODONE HYDROCHLORIDE 10 MG: 5 TABLET ORAL at 03:09

## 2018-09-29 RX ADMIN — OXYCODONE HYDROCHLORIDE 10 MG: 5 TABLET ORAL at 09:09

## 2018-09-29 RX ADMIN — PIPERACILLIN SODIUM AND TAZOBACTAM SODIUM 4.5 G: 4; .5 INJECTION, POWDER, LYOPHILIZED, FOR SOLUTION INTRAVENOUS at 01:09

## 2018-09-29 RX ADMIN — ISOSORBIDE MONONITRATE 30 MG: 30 TABLET, EXTENDED RELEASE ORAL at 08:09

## 2018-09-29 RX ADMIN — OXYCODONE HYDROCHLORIDE 10 MG: 5 TABLET ORAL at 08:09

## 2018-09-29 RX ADMIN — RANOLAZINE 500 MG: 500 TABLET, FILM COATED, EXTENDED RELEASE ORAL at 09:09

## 2018-09-29 RX ADMIN — INSULIN DETEMIR 30 UNITS: 100 INJECTION, SOLUTION SUBCUTANEOUS at 09:09

## 2018-09-29 RX ADMIN — AMLODIPINE BESYLATE 5 MG: 5 TABLET ORAL at 08:09

## 2018-09-29 RX ADMIN — METOPROLOL TARTRATE 100 MG: 50 TABLET ORAL at 09:09

## 2018-09-29 RX ADMIN — PANTOPRAZOLE SODIUM 40 MG: 40 TABLET, DELAYED RELEASE ORAL at 08:09

## 2018-09-29 RX ADMIN — METOPROLOL TARTRATE 100 MG: 50 TABLET ORAL at 08:09

## 2018-09-29 NOTE — SUBJECTIVE & OBJECTIVE
Interval History: Pt was seen and examined at bedside . He states  Feeling better toda  .     Review of Systems   Constitutional: Negative for chills, diaphoresis, fatigue and fever.   HENT: Negative for congestion, sore throat and voice change.    Eyes: Negative for photophobia and visual disturbance.   Respiratory: Negative for cough, shortness of breath, wheezing and stridor.    Cardiovascular: Negative for chest pain and leg swelling.   Gastrointestinal: Negative for abdominal distention, abdominal pain, constipation, diarrhea, nausea and vomiting.   Endocrine: Negative for polydipsia, polyphagia and polyuria.   Genitourinary: Negative for difficulty urinating, dysuria, flank pain, testicular pain and urgency.   Musculoskeletal: Positive for arthralgias and joint swelling. Negative for back pain, gait problem, neck pain and neck stiffness.   Skin: Negative for color change and rash.   Allergic/Immunologic: Negative for immunocompromised state.   Neurological: Negative for dizziness, syncope, weakness, numbness and headaches.   Hematological: Does not bruise/bleed easily.   Psychiatric/Behavioral: Negative for agitation, behavioral problems and confusion.     Objective:     Vital Signs (Most Recent):  Temp: 98 °F (36.7 °C) (09/29/18 1158)  Pulse: 69 (09/29/18 1506)  Resp: 18 (09/29/18 1158)  BP: (!) 111/55 (09/29/18 1158)  SpO2: 95 % (09/29/18 1158) Vital Signs (24h Range):  Temp:  [97.9 °F (36.6 °C)-98.8 °F (37.1 °C)] 98 °F (36.7 °C)  Pulse:  [64-74] 69  Resp:  [18-20] 18  SpO2:  [95 %-99 %] 95 %  BP: (111-131)/(53-70) 111/55     Weight: 108.5 kg (239 lb 3.2 oz)  Body mass index is 32.44 kg/m².    Intake/Output Summary (Last 24 hours) at 9/29/2018 1618  Last data filed at 9/29/2018 1200  Gross per 24 hour   Intake 2576.25 ml   Output --   Net 2576.25 ml      Physical Exam   Constitutional: He is oriented to person, place, and time. He appears well-developed and well-nourished. No distress.   HENT:   Head:  Normocephalic and atraumatic.   Nose: Nose normal.   Eyes: Conjunctivae and EOM are normal. Pupils are equal, round, and reactive to light. No scleral icterus.   Neck: Normal range of motion. Neck supple. No tracheal deviation present.   Cardiovascular: Normal rate, regular rhythm, normal heart sounds and intact distal pulses.   No murmur heard.  Pulmonary/Chest: Effort normal and breath sounds normal. No stridor. No respiratory distress. He has no wheezes. He has no rales.   Abdominal: Soft. Bowel sounds are normal. He exhibits no distension. There is no tenderness. There is no guarding.   Genitourinary:   Genitourinary Comments: Check urine     Musculoskeletal: He exhibits edema ( right elbow and below into forarm.) and tenderness ( elbow right). He exhibits no deformity.   Decreased ROM to right elbow and with pain     Neurological: He is alert and oriented to person, place, and time. No cranial nerve deficit.   Skin: Skin is warm and dry. Capillary refill takes less than 2 seconds. No rash noted. He is not diaphoretic.   Psychiatric: He has a normal mood and affect. His behavior is normal. Judgment and thought content normal.   Nursing note and vitals reviewed.      Significant Labs: All pertinent labs within the past 24 hours have been reviewed.    Significant Imaging: I have reviewed all pertinent imaging results/findings within the past 24 hours.

## 2018-09-29 NOTE — ASSESSMENT & PLAN NOTE
Ssi, long acting  Currently NPO, on gentle IV hydration with d5 1/2NS-->montior   Diabetic educator consult  Optimization pending course

## 2018-09-29 NOTE — HOSPITAL COURSE
Patient is a 57 y/o admitted to Medical Surgical Unit for Cellulitis of right elbow . Pt was started on broad spectrum IV antibiotics and analgesia prn. Orthopedic Surgery consulted with no surgical intervention recommended.  Elevated creatinine noted with downward trend to baseline.  LFTs also noted to be mildly elevated with Statin and Fenofibrate held.  Case discussed with Dr. Lehman (Orthopedic Surgery) prior to discharge.  Pt verbalized symptom improvement with decreased edema and increased mobility noted.  Vital signs stable.  Pt seen and examined on the date of discharge and deemed suitable for discharge to home.  Pt instructed to keep affected limb elevated.  Current medications resumed with Clindamycin and Oxycodone prescribed.  Pt instructed to follow up with PCP and Orthopedic Surgery in 1 week.

## 2018-09-29 NOTE — ASSESSMENT & PLAN NOTE
Diff: bursitis/abscess of right elbow.  Ortho consulted in ER  Was unable to get CT with contrast due to kidney function. Check US as recommended per CT.   Vanc/Rocephin  Ortho  Does not think is septic arthritis . They recommend to cont medical tx for now and  Cont monitor

## 2018-09-29 NOTE — PROGRESS NOTES
[facility] Hospital Medicine  Progress Note    Patient Name: Christiano Cox  MRN: [id number]  Patient Class: IP- Inpatient   Admission Date: 9/27/2018  Length of Stay: 1 days  Attending Physician: Jesus Lin, *  Primary Care Provider: Ace Gómez MD        Subjective:     Principal Problem:Cellulitis of right elbow    HPI:  Christiano Cox is a 58 y.o. male patient with a PMHx of gout, DM on insulin, HTN who presents for right elbow pain which onset about 4 days ago.   Modifying factor: Pain is exacerbated with any movement or palpation of the joint. Associated sxs include increased warmth/erythema/swelling in the area. Pt reports being evaluated by his PCP Dr. Gómez on 9/25/18 for similar symptoms and received a 1g dose of Rocephin and was prescribed Augmentin (875-125 mg) PO  with no improvement, andreports sxs have worsened. Patient denies any fever, chills, drainage from R elbow, recent injection to area, R shoulder pain, R wrist pain, extremity weakness/numbness/tingling, N/V/D, any recent injury/trauma to affected area. and all other sxs at this time. No further complaints or concerns at this time.  Patient was evaluated in the ER. WBC neg. Cr 2.7, BUN 56. (4 months ago Cr about 2). K 3.9. CT without contrast of right arm elbow Posterior elbow cellulitis versus olecranon bursitis.  An abscess is not excluded.  Recommend ultrasound. Patient started on IV rocephin and vanc. Ortho consulted who plan to see.   consulted for admission. Patient admitted.         Hospital Course:  57 y/o admitted with a dx of possible septic arthritis . Pt was started on broad spectrum ivab . Ortho was consulted and recommend to cont medical tx for now     Interval History: Pt was seen and examined at bedside . He states  Feeling better toda  .     Review of Systems   Constitutional: Negative for chills, diaphoresis, fatigue and fever.   HENT: Negative for congestion, sore throat and voice change.     Eyes: Negative for photophobia and visual disturbance.   Respiratory: Negative for cough, shortness of breath, wheezing and stridor.    Cardiovascular: Negative for chest pain and leg swelling.   Gastrointestinal: Negative for abdominal distention, abdominal pain, constipation, diarrhea, nausea and vomiting.   Endocrine: Negative for polydipsia, polyphagia and polyuria.   Genitourinary: Negative for difficulty urinating, dysuria, flank pain, testicular pain and urgency.   Musculoskeletal: Positive for arthralgias and joint swelling. Negative for back pain, gait problem, neck pain and neck stiffness.   Skin: Negative for color change and rash.   Allergic/Immunologic: Negative for immunocompromised state.   Neurological: Negative for dizziness, syncope, weakness, numbness and headaches.   Hematological: Does not bruise/bleed easily.   Psychiatric/Behavioral: Negative for agitation, behavioral problems and confusion.     Objective:     Vital Signs (Most Recent):  Temp: 98 °F (36.7 °C) (09/29/18 1158)  Pulse: 69 (09/29/18 1506)  Resp: 18 (09/29/18 1158)  BP: (!) 111/55 (09/29/18 1158)  SpO2: 95 % (09/29/18 1158) Vital Signs (24h Range):  Temp:  [97.9 °F (36.6 °C)-98.8 °F (37.1 °C)] 98 °F (36.7 °C)  Pulse:  [64-74] 69  Resp:  [18-20] 18  SpO2:  [95 %-99 %] 95 %  BP: (111-131)/(53-70) 111/55     Weight: 108.5 kg (239 lb 3.2 oz)  Body mass index is 32.44 kg/m².    Intake/Output Summary (Last 24 hours) at 9/29/2018 1618  Last data filed at 9/29/2018 1200  Gross per 24 hour   Intake 2576.25 ml   Output --   Net 2576.25 ml      Physical Exam   Constitutional: He is oriented to person, place, and time. He appears well-developed and well-nourished. No distress.   HENT:   Head: Normocephalic and atraumatic.   Nose: Nose normal.   Eyes: Conjunctivae and EOM are normal. Pupils are equal, round, and reactive to light. No scleral icterus.   Neck: Normal range of motion. Neck supple. No tracheal deviation present.   Cardiovascular:  Normal rate, regular rhythm, normal heart sounds and intact distal pulses.   No murmur heard.  Pulmonary/Chest: Effort normal and breath sounds normal. No stridor. No respiratory distress. He has no wheezes. He has no rales.   Abdominal: Soft. Bowel sounds are normal. He exhibits no distension. There is no tenderness. There is no guarding.   Genitourinary:   Genitourinary Comments: Check urine     Musculoskeletal: He exhibits edema ( right elbow and below into forarm.) and tenderness ( elbow right). He exhibits no deformity.   Decreased ROM to right elbow and with pain     Neurological: He is alert and oriented to person, place, and time. No cranial nerve deficit.   Skin: Skin is warm and dry. Capillary refill takes less than 2 seconds. No rash noted. He is not diaphoretic.   Psychiatric: He has a normal mood and affect. His behavior is normal. Judgment and thought content normal.   Nursing note and vitals reviewed.      Significant Labs: All pertinent labs within the past 24 hours have been reviewed.    Significant Imaging: I have reviewed all pertinent imaging results/findings within the past 24 hours.    Assessment/Plan:      * Cellulitis of right elbow    Diff: bursitis/abscess of right elbow.  Ortho consulted in ER  Was unable to get CT with contrast due to kidney function. Check US as recommended per CT.   Vanc/Rocephin  Ortho  Does not think is septic arthritis . They recommend to cont medical tx for now and  Cont monitor         TATY (acute kidney injury)    Acute On CKD  Pharmacy to renal dose vanc  Hold diuretic  Change IV fluids with K+ to without  IV hydration and monitor for improvement. Consider consult to renal pending course.   Hold ARB  Check A1C and may need better control of DM        Coronary artery disease involving native coronary artery without angina pectoris    Continue ranexa  Hold anticoag/antiplat. As may need intervetion , ortho consulted  Hold statin for now  Monitor   Currently no  cardiac complaints          Hyperlipidemia associated with type 2 diabetes mellitus    Hold statin at this time, mild elevation in LFT with TATY            Hypertension associated with diabetes    Hold ARB due to TATY  Continue CCB, BB  Monitor trends and PRN as need        Type II diabetes mellitus with renal manifestations    Ssi, long acting  Currently NPO, on gentle IV hydration with d5 1/2NS-->montior   Diabetic educator consult  Optimization pending course          VTE Risk Mitigation (From admission, onward)        Ordered     IP VTE HIGH RISK PATIENT  Once      09/28/18 0806     Place MARLON hose  Until discontinued      09/28/18 0806     Place sequential compression device  Until discontinued      09/28/18 0806     Reason for No Pharmacological VTE Prophylaxis  Once      09/28/18 0806              Jesus Lin MD  Department of Hospital Medicine   [facility]

## 2018-09-29 NOTE — PLAN OF CARE
Problem: Patient Care Overview  Goal: Individualization & Mutuality  Outcome: Ongoing (interventions implemented as appropriate)  Patient awake and alert, in NAD. Patient had uneventful shift. VS stable. Patient denies pain or SOB. Patient on telemetry, NSR. Patient ambulates without assistance. Fall precautions in place. Bed locked and in lowest position. Yellow fall risk band and non-skid socks on patient. Patient free from fall/injury. Plan of care reviewed with patient. All questions answered. Will continue to monitor.

## 2018-09-29 NOTE — PLAN OF CARE
Problem: Patient Care Overview  Goal: Plan of Care Review  Outcome: Ongoing (interventions implemented as appropriate)  Remains free from harm, pain controlled via PO pain meds, positions self, no acute distress noted. 24 hour chart check complete.

## 2018-09-29 NOTE — ASSESSMENT & PLAN NOTE
Continue ranexa  Hold anticoag/antiplat. As may need intervetion , ortho consulted  Hold statin for now  Monitor   Currently no cardiac complaints     I have personally seen and examined this patient.  I have fully participated in the care of this patient. I have reviewed all pertinent clinical information, including history, physical exam, plan and the Resident’s note and agree except as noted.

## 2018-09-29 NOTE — CONSULTS
Consult received for dc planning. Initial assessment completed yesterday. Discussed consult with chg nurse. Will continue to follow and assess discharge needs.

## 2018-09-29 NOTE — PROGRESS NOTES
Patient reports decreased pain and more ability to move  AVSS  RUE:  Decreased swelling compared to yesterday  NVID  A/P  Clinically improving continue IV abx and observation

## 2018-09-29 NOTE — CLINICAL REVIEW
Clinical Pharmacy Review    Pharmacy Vancomycin Consult day #2  Vancomycin random 9/29 at 0504 = 6.1 (drawn approximately 23 hours after 1,500 mg LD)  Will schedule dosing at 1,000 mg every 18 hours   Scr improvement to 2.0 (CrCl 50.6 ml/min), which appears to be patient's baseline  Trough due 9/30 at 2125  Dx: Elbow cellulitis  Goal trough: 10-15   WBC/Temp WNL  No cultures collected   Also on Zosyn (day #2)  Ortho following     Pharmacy will continue to closely monitor patient and make adjustments as necessary.   Thank you for allowing us to participate in this patient's care,   Rosa Waite 9/29/2018 11:20 AM

## 2018-09-30 VITALS
RESPIRATION RATE: 18 BRPM | HEART RATE: 63 BPM | OXYGEN SATURATION: 97 % | TEMPERATURE: 99 F | DIASTOLIC BLOOD PRESSURE: 78 MMHG | WEIGHT: 239 LBS | BODY MASS INDEX: 32.37 KG/M2 | HEIGHT: 72 IN | SYSTOLIC BLOOD PRESSURE: 136 MMHG

## 2018-09-30 LAB
ALBUMIN SERPL BCP-MCNC: 3 G/DL
ALP SERPL-CCNC: 37 U/L
ALT SERPL W/O P-5'-P-CCNC: 45 U/L
ANION GAP SERPL CALC-SCNC: 10 MMOL/L
AST SERPL-CCNC: 34 U/L
BASOPHILS # BLD AUTO: 0.02 K/UL
BASOPHILS NFR BLD: 0.3 %
BILIRUB SERPL-MCNC: 0.4 MG/DL
BUN SERPL-MCNC: 24 MG/DL
CALCIUM SERPL-MCNC: 9.8 MG/DL
CHLORIDE SERPL-SCNC: 106 MMOL/L
CO2 SERPL-SCNC: 23 MMOL/L
CREAT SERPL-MCNC: 1.8 MG/DL
DIFFERENTIAL METHOD: ABNORMAL
EOSINOPHIL # BLD AUTO: 0.5 K/UL
EOSINOPHIL NFR BLD: 6.6 %
ERYTHROCYTE [DISTWIDTH] IN BLOOD BY AUTOMATED COUNT: 13.6 %
EST. GFR  (AFRICAN AMERICAN): 47 ML/MIN/1.73 M^2
EST. GFR  (NON AFRICAN AMERICAN): 40 ML/MIN/1.73 M^2
GLUCOSE SERPL-MCNC: 92 MG/DL
HCT VFR BLD AUTO: 32.4 %
HGB BLD-MCNC: 11.5 G/DL
LYMPHOCYTES # BLD AUTO: 1.3 K/UL
LYMPHOCYTES NFR BLD: 18.6 %
MCH RBC QN AUTO: 29.8 PG
MCHC RBC AUTO-ENTMCNC: 35.5 G/DL
MCV RBC AUTO: 84 FL
MONOCYTES # BLD AUTO: 0.9 K/UL
MONOCYTES NFR BLD: 12 %
NEUTROPHILS # BLD AUTO: 4.5 K/UL
NEUTROPHILS NFR BLD: 62.5 %
PLATELET # BLD AUTO: 228 K/UL
PMV BLD AUTO: 8.5 FL
POCT GLUCOSE: 138 MG/DL (ref 70–110)
POCT GLUCOSE: 90 MG/DL (ref 70–110)
POTASSIUM SERPL-SCNC: 4.1 MMOL/L
PROT SERPL-MCNC: 6.9 G/DL
RBC # BLD AUTO: 3.86 M/UL
SODIUM SERPL-SCNC: 139 MMOL/L
WBC # BLD AUTO: 7.16 K/UL

## 2018-09-30 PROCEDURE — 25000003 PHARM REV CODE 250: Performed by: NURSE PRACTITIONER

## 2018-09-30 PROCEDURE — 85025 COMPLETE CBC W/AUTO DIFF WBC: CPT

## 2018-09-30 PROCEDURE — 94799 UNLISTED PULMONARY SVC/PX: CPT

## 2018-09-30 PROCEDURE — 36415 COLL VENOUS BLD VENIPUNCTURE: CPT

## 2018-09-30 PROCEDURE — 25000003 PHARM REV CODE 250: Performed by: EMERGENCY MEDICINE

## 2018-09-30 PROCEDURE — 80053 COMPREHEN METABOLIC PANEL: CPT

## 2018-09-30 PROCEDURE — 63600175 PHARM REV CODE 636 W HCPCS: Performed by: INTERNAL MEDICINE

## 2018-09-30 PROCEDURE — 25000003 PHARM REV CODE 250: Performed by: INTERNAL MEDICINE

## 2018-09-30 RX ORDER — OXYCODONE HYDROCHLORIDE 5 MG/1
5 TABLET ORAL EVERY 6 HOURS PRN
Qty: 12 TABLET | Refills: 0 | Status: SHIPPED | OUTPATIENT
Start: 2018-09-30 | End: 2018-10-05

## 2018-09-30 RX ORDER — CLINDAMYCIN HYDROCHLORIDE 300 MG/1
300 CAPSULE ORAL EVERY 8 HOURS
Qty: 21 CAPSULE | Refills: 0 | Status: SHIPPED | OUTPATIENT
Start: 2018-09-30 | End: 2018-10-07

## 2018-09-30 RX ADMIN — VANCOMYCIN HYDROCHLORIDE 1000 MG: 1 INJECTION, POWDER, LYOPHILIZED, FOR SOLUTION INTRAVENOUS at 03:09

## 2018-09-30 RX ADMIN — PIPERACILLIN SODIUM AND TAZOBACTAM SODIUM 4.5 G: 4; .5 INJECTION, POWDER, LYOPHILIZED, FOR SOLUTION INTRAVENOUS at 05:09

## 2018-09-30 RX ADMIN — OXYCODONE HYDROCHLORIDE 10 MG: 5 TABLET ORAL at 11:09

## 2018-09-30 RX ADMIN — LEVOTHYROXINE SODIUM 75 MCG: 25 TABLET ORAL at 05:09

## 2018-09-30 RX ADMIN — OXYCODONE HYDROCHLORIDE 10 MG: 5 TABLET ORAL at 05:09

## 2018-09-30 RX ADMIN — ISOSORBIDE MONONITRATE 30 MG: 30 TABLET, EXTENDED RELEASE ORAL at 08:09

## 2018-09-30 RX ADMIN — PIPERACILLIN SODIUM AND TAZOBACTAM SODIUM 4.5 G: 4; .5 INJECTION, POWDER, LYOPHILIZED, FOR SOLUTION INTRAVENOUS at 01:09

## 2018-09-30 RX ADMIN — METOPROLOL TARTRATE 100 MG: 50 TABLET ORAL at 08:09

## 2018-09-30 RX ADMIN — RANOLAZINE 500 MG: 500 TABLET, FILM COATED, EXTENDED RELEASE ORAL at 08:09

## 2018-09-30 RX ADMIN — PANTOPRAZOLE SODIUM 40 MG: 40 TABLET, DELAYED RELEASE ORAL at 08:09

## 2018-09-30 RX ADMIN — AMLODIPINE BESYLATE 5 MG: 5 TABLET ORAL at 08:09

## 2018-09-30 NOTE — PROGRESS NOTES
HD 3  Reports decreasing pain, improved range of motion, less swelling  AVSS  PE:  Less TTP  Improved ROM  Decreased swelling  Plan:  1. Recommend continued antibiotics  2. Strict elevation

## 2018-09-30 NOTE — PLAN OF CARE
Problem: Patient Care Overview  Goal: Plan of Care Review  Outcome: Ongoing (interventions implemented as appropriate)  Pain controlled. Free from falls and injuries this shift. Right arm/hand swollen and tender. Right upper extremity elevated on pillows and ice pack applied. Chart check completed.

## 2018-09-30 NOTE — DISCHARGE SUMMARY
[facility] Hospital Medicine  Discharge Summary      Patient Name: Christiano Cox  MRN: [id number]  Admission Date: 9/27/2018  Hospital Length of Stay: 2 days  Discharge Date and Time: 9/30/2018  5:39 PM  Attending Physician: Dr. Richard Reddy   Discharging Provider: Tiffany Escalante NP  Primary Care Provider: Ace Gómez MD      HPI:   Christiano Cox is a 58 y.o. male patient with a PMHx of gout, DM on insulin, HTN who presents for right elbow pain which onset about 4 days ago.   Modifying factor: Pain is exacerbated with any movement or palpation of the joint. Associated sxs include increased warmth/erythema/swelling in the area. Pt reports being evaluated by his PCP Dr. Gómez on 9/25/18 for similar symptoms and received a 1g dose of Rocephin and was prescribed Augmentin (875-125 mg) PO  with no improvement, andreports sxs have worsened. Patient denies any fever, chills, drainage from R elbow, recent injection to area, R shoulder pain, R wrist pain, extremity weakness/numbness/tingling, N/V/D, any recent injury/trauma to affected area. and all other sxs at this time. No further complaints or concerns at this time.  Patient was evaluated in the ER. WBC neg. Cr 2.7, BUN 56. (4 months ago Cr about 2). K 3.9. CT without contrast of right arm elbow Posterior elbow cellulitis versus olecranon bursitis.  An abscess is not excluded.  Recommend ultrasound. Patient started on IV rocephin and vanc. Ortho consulted who plan to see.   consulted for admission. Patient admitted.         * No surgery found *      Hospital Course:   Patient is a 57 y/o admitted to Medical Surgical Unit for Cellulitis of right elbow . Pt was started on broad spectrum IV antibiotics and analgesia prn. Orthopedic Surgery consulted with no surgical intervention recommended.  Elevated creatinine noted with downward trend to baseline.  LFTs also noted to be mildly elevated with Statin and Fenofibrate held.  Hyperglycemia noted with  diabetic education completed.  Case discussed with Dr. Lehman (Orthopedic Surgery) prior to discharge.  Pt verbalized symptom improvement with decreased edema and increased mobility noted.  Pt afebrile with no leukocytosis and vital signs stable.  Pt seen and examined on the date of discharge and deemed suitable for discharge to home.  Pt instructed to keep affected limb elevated.  Current medications resumed with Clindamycin and Oxycodone prescribed.  Pt instructed to follow up with PCP and Orthopedic Surgery in 1 week for further evaluation.       Consults:   Consults (From admission, onward)        Status Ordering Provider     Inpatient consult to Diabetes educator  Once     Provider:  (Not yet assigned)    Completed KORY KOHLER     Inpatient consult to Orthopedic Surgery  Once     Provider:  Kelvin Perry MD    Completed TONY BULL     IP consult to case management  Once     Provider:  (Not yet assigned)    Completed RADHA CAIN     Pharmacy to dose Vancomycin consult  Once     Provider:  (Not yet assigned)    Acknowledged KORY KOHLER          Final Active Diagnoses:    Diagnosis Date Noted POA    PRINCIPAL PROBLEM:  Cellulitis of right elbow [L03.113] 09/28/2018 Yes    TATY (acute kidney injury) [N17.9] 09/28/2018 Yes    Coronary artery disease involving native coronary artery without angina pectoris [I25.10] 09/13/2016 Yes    Hyperlipidemia associated with type 2 diabetes mellitus [E11.69, E78.5]  Yes    Hypertension associated with diabetes [E11.59, I10]  Yes    Type II diabetes mellitus with renal manifestations [E11.29]  Yes      Problems Resolved During this Admission:       Discharged Condition: stable    Disposition: Home or Self Care    Follow Up:  Follow-up Information     Ace Gómez MD In 3 days.    Specialty:  Internal Medicine  Why:  -hospital follow up   Contact information:  [address]  [address]  [facility] [address]  [phone]             [place]  Jaylin Straks MD In 1 week.    Specialty:  Orthopedic Surgery  Why:  -hospital follow up  Contact information:  [address]  [place] [address] [phone]                 Patient Instructions:      Diet diabetic     Diet Cardiac     Notify your health care provider if you experience any of the following:  temperature >100.4     Notify your health care provider if you experience any of the following:  persistent nausea and vomiting or diarrhea     Notify your health care provider if you experience any of the following:  redness, tenderness, or signs of infection (pain, swelling, redness, odor or green/yellow discharge around incision site)     Notify your health care provider if you experience any of the following:  severe uncontrolled pain     Notify your health care provider if you experience any of the following:  difficulty breathing or increased cough     Notify your health care provider if you experience any of the following:  persistent dizziness, light-headedness, or visual disturbances     Notify your health care provider if you experience any of the following:  increased confusion or weakness     Activity as tolerated       Significant Diagnostic Studies: Labs:   CMP   Recent Labs   Lab  09/29/18   0504  09/30/18   0531   NA  139  139   K  3.7  4.1   CL  105  106   CO2  24  23   GLU  95  92   BUN  32*  24*   CREATININE  2.0*  1.8*   CALCIUM  9.5  9.8   PROT  6.6  6.9   ALBUMIN  2.9*  3.0*   BILITOT  0.4  0.4   ALKPHOS  37*  37*   AST  37  34   ALT  46*  45*   ANIONGAP  10  10   ESTGFRAFRICA  41*  47*   EGFRNONAA  35*  40*    and CBC   Recent Labs   Lab  09/29/18   0504  09/30/18   0531   WBC  7.10  7.16   HGB  11.0*  11.5*   HCT  31.6*  32.4*   PLT  220  228       Pending Diagnostic Studies:     None         Medications:  Reconciled Home Medications:      Medication List      START taking these medications    clindamycin 300 MG capsule  Commonly known as:  CLEOCIN  Take 1 capsule (300 mg total) by mouth  "every 8 (eight) hours. for 7 days     oxyCODONE 5 MG immediate release tablet  Commonly known as:  ROXICODONE  Take 1 tablet (5 mg total) by mouth every 6 (six) hours as needed.        CONTINUE taking these medications    ACCU-CHEK MULTICLIX LANCET Misc  Generic drug:  lancets  TEST DAILY AS DIRECTED     amLODIPine 5 MG tablet  Commonly known as:  NORVASC  Take 1 tablet (5 mg total) by mouth once daily.     aspirin 81 MG Chew  Take 81 mg by mouth once daily.     BRILINTA 90 mg tablet  Generic drug:  ticagrelor  TAKE ONE TABLET BY MOUTH TWICE DAILY     FREESTYLE LITE STRIPS Strp  Generic drug:  blood sugar diagnostic  CHECK BLOOD SUGAR FOUR TIMES DAILY     furosemide 20 MG tablet  Commonly known as:  LASIX  TAKE ONE TABLET BY MOUTH DAILY     isosorbide mononitrate 30 MG 24 hr tablet  Commonly known as:  IMDUR  TAKE ONE TABLET BY MOUTH EVERY DAY     levothyroxine 75 MCG tablet  Commonly known as:  SYNTHROID  Take 1 tablet (75 mcg total) by mouth once daily.     metoprolol tartrate 100 MG tablet  Commonly known as:  LOPRESSOR  TAKE ONE TABLET BY MOUTH TWICE DAILY     nitroGLYCERIN 0.4 MG SL tablet  Commonly known as:  NITROSTAT  PLACE ONE TABLET UNDER THE TONGUE EVERY FIVE MINUTES AS NEEDED FOR CHEST PAIN     RANEXA 500 MG Tb12  Generic drug:  ranolazine  TAKE ONE TABLET BY MOUTH TWICE DAILY     SURE COMFORT PEN NEEDLE 31 gauge x 5/16" Ndle  Generic drug:  pen needle, diabetic  AS DIRECTED     TOUJEO SOLOSTAR U-300 INSULIN 300 unit/mL (1.5 mL) Inpn pen  Generic drug:  insulin glargine (TOUJEO)  INJECT 60 UNITS SUBCUTANEOUSLY ONCE A DAY     TRULICITY 1.5 mg/0.5 mL Pnij  Generic drug:  dulaglutide  INJECT 1.5 MG SUBCUTANEOUSLY ONCE WEEKLY        STOP taking these medications    amoxicillin-clavulanate 875-125mg 875-125 mg per tablet  Commonly known as:  AUGMENTIN     fenofibrate micronized 67 MG capsule  Commonly known as:  LOFIBRA     irbesartan-hydrochlorothiazide 300-12.5 mg per tablet  Commonly known as:  AVALIDE   "   rosuvastatin 40 MG Tab  Commonly known as:  CRESTOR     tiZANidine 4 MG tablet  Commonly known as:  ZANAFLEX            Indwelling Lines/Drains at time of discharge:   Lines/Drains/Airways          None          Time spent on the discharge of patient: > 30 minutes  Patient was seen and examined on the date of discharge and determined to be suitable for discharge.         Tiffany Escalante NP  Department of Hospital Medicine  [facility]

## 2018-10-03 ENCOUNTER — OFFICE VISIT (OUTPATIENT)
Dept: INTERNAL MEDICINE | Facility: CLINIC | Age: 58
End: 2018-10-03
Payer: COMMERCIAL

## 2018-10-03 VITALS
SYSTOLIC BLOOD PRESSURE: 118 MMHG | WEIGHT: 234.81 LBS | OXYGEN SATURATION: 98 % | HEIGHT: 72 IN | HEART RATE: 82 BPM | DIASTOLIC BLOOD PRESSURE: 80 MMHG | BODY MASS INDEX: 31.8 KG/M2 | TEMPERATURE: 99 F | RESPIRATION RATE: 18 BRPM

## 2018-10-03 DIAGNOSIS — M10.9 GOUT, UNSPECIFIED CAUSE, UNSPECIFIED CHRONICITY, UNSPECIFIED SITE: Primary | ICD-10-CM

## 2018-10-03 DIAGNOSIS — M70.22 OLECRANON BURSITIS OF BOTH ELBOWS: ICD-10-CM

## 2018-10-03 DIAGNOSIS — M70.21 OLECRANON BURSITIS OF BOTH ELBOWS: ICD-10-CM

## 2018-10-03 PROCEDURE — 3074F SYST BP LT 130 MM HG: CPT | Mod: CPTII,S$GLB,, | Performed by: INTERNAL MEDICINE

## 2018-10-03 PROCEDURE — 99999 PR PBB SHADOW E&M-EST. PATIENT-LVL V: CPT | Mod: PBBFAC,,, | Performed by: INTERNAL MEDICINE

## 2018-10-03 PROCEDURE — 99214 OFFICE O/P EST MOD 30 MIN: CPT | Mod: 25,S$GLB,, | Performed by: INTERNAL MEDICINE

## 2018-10-03 PROCEDURE — 3079F DIAST BP 80-89 MM HG: CPT | Mod: CPTII,S$GLB,, | Performed by: INTERNAL MEDICINE

## 2018-10-03 PROCEDURE — 3008F BODY MASS INDEX DOCD: CPT | Mod: CPTII,S$GLB,, | Performed by: INTERNAL MEDICINE

## 2018-10-03 PROCEDURE — 96372 THER/PROPH/DIAG INJ SC/IM: CPT | Mod: S$GLB,,, | Performed by: INTERNAL MEDICINE

## 2018-10-03 RX ORDER — PREDNISONE 20 MG/1
TABLET ORAL
Qty: 12 TABLET | Refills: 0 | Status: SHIPPED | OUTPATIENT
Start: 2018-10-03 | End: 2019-03-12

## 2018-10-03 RX ORDER — METHYLPREDNISOLONE ACETATE 80 MG/ML
80 INJECTION, SUSPENSION INTRA-ARTICULAR; INTRALESIONAL; INTRAMUSCULAR; SOFT TISSUE ONCE
Status: COMPLETED | OUTPATIENT
Start: 2018-10-03 | End: 2018-10-03

## 2018-10-03 RX ADMIN — METHYLPREDNISOLONE ACETATE 80 MG: 80 INJECTION, SUSPENSION INTRA-ARTICULAR; INTRALESIONAL; INTRAMUSCULAR; SOFT TISSUE at 03:10

## 2018-10-03 NOTE — PROGRESS NOTES
HPI:  Patient is a 58-year-old man who comes today for hospital follow-up.  He saw me approximately 1 week ago for swelling in his right elbow.  The elbow was non swollen but it was from extremely warm and erythematous.  Patient was placed on oral antibiotics.  Two days later he was admitted the hospital due to progressive swelling. He also developed swelling and warmth in his left elbow.  He was seen in the ER by Orthopedics.  It was decided only to leaving on IV and then switched to oral antibiotics.  The joints was never tapped.  He never had any symptoms consistent with septic arthritis.  He always had fairly good range of motion.  He has now been home for last 4 days.  Two days ago he began developing pain swelling redness in his left great toe.  Patient does have a history of gout.  He has not been on any preventive medication for it.    Current meds have been verified and updated per the EMR  Exam:/80   Pulse 82   Temp 99 °F (37.2 °C)   Resp 18   Ht 6' (1.829 m)   Wt 106.5 kg (234 lb 12.6 oz)   SpO2 98%   BMI 31.84 kg/m²   Both elbows now left greater than right are warm.  He has a significant the effusion in the left elbow.  He has good range of motion of the elbows.  His left great toe has classical symptoms of gout with warmth redness and tenderness in the 1st MTP joint    Lab Results   Component Value Date    WBC 7.16 09/30/2018    HGB 11.5 (L) 09/30/2018    HCT 32.4 (L) 09/30/2018     09/30/2018    CHOL 138 09/28/2018    TRIG 109 09/28/2018    HDL 26 (L) 09/28/2018    ALT 45 (H) 09/30/2018    AST 34 09/30/2018     09/30/2018    K 4.1 09/30/2018     09/30/2018    CREATININE 1.8 (H) 09/30/2018    BUN 24 (H) 09/30/2018    CO2 23 09/30/2018    TSH 2.895 09/28/2018    PSA 0.70 10/10/2017    INR 1.1 09/28/2018    HGBA1C 7.2 (H) 09/28/2018       Impression:  I am now highly suspicious that all of his joint problems is related to gout.  Patient Active Problem List   Diagnosis     Coronary atherosclerosis    DM (diabetes mellitus), type 2 with complications    SOFIYA on CPAP    Type II diabetes mellitus with renal manifestations    Hypertension associated with diabetes    Hyperlipidemia associated with type 2 diabetes mellitus    History of gout    Lumbar radiculopathy    Olecranon bone spur    Olecranon bursitis of right elbow    Coronary artery disease involving native coronary artery without angina pectoris    Central sleep apnea due to Cheyne-Pérez respiration    Periodic limb movement disorder (PLMD)    Restless legs syndrome (RLS)    Shift work sleep disorder    Poor sleep hygiene    Cellulitis of right elbow    TATY (acute kidney injury)       Plan:  Orders Placed This Encounter    methylPREDNISolone acetate injection 80 mg    predniSONE (DELTASONE) 20 MG tablet     He was given 1 cc Depo-Medrol 80.  He was placed on 40 mg of prednisone for 3 days and then 20 mg for 3 days and then 10 mg for 6 days.  He will be seen again in 2 days.  If he is not markedly better, I would like him to see a rheumatologist.  Once this current flare gout is quiescent he will start back on allopurinol or Uloric

## 2018-10-03 NOTE — PROGRESS NOTES
Depo-Medrol 80 mg/ml IM right ventrogluteal.  Exp 08/2020 [facility] [place] [place] Pfizer lot# [id number].  Pt advised to wait in clinic 15 minutes to monitor for side effects.  Pt voiced understanding and tolerated injection well.

## 2018-10-04 NOTE — PROGRESS NOTES
Subjective:     Patient ID: Christiano Cox is a 58 y.o. male.    Chief Complaint: Pain of the Right Elbow    HPI   The patient is seen today as a hospital follow-up regarding his right elbow. He is right hand dominant. He was admitted last week due to cellulitis of his elbow. He had swelling and pain and was also red to the touch. He denies fever at home but his elbow felt very hot. Dr. Gómez recommended that he go to the hospital for further workup. While in the  Hospital he did have IV Abx- Vanc and Zosyn. The patient does report a h/o gout    The patient was prescribed clindamycin 300 mg and is still taking. He still feels a little sore but denies any worsening symptoms or fever at home.     He does have persistent pain and swelling of his left elbow. He states that he providers were aware of this in the hospital but just observed it.     He currently does not have any pain.     Past Medical History:   Diagnosis Date    Acute coronary syndrome     CKD stage 3 due to type 2 diabetes mellitus     Coronary artery disease involving native coronary artery without angina pectoris 9/13/2016    Coronary atherosclerosis of unspecified type of vessel, native or graft     s/p cabg and stents    DM (diabetes mellitus), type 2 with complications     History of gout     Hyperlipidemia associated with type 2 diabetes mellitus     Hypertension associated with diabetes     NSTEMI (non-ST elevated myocardial infarction) 5/16/2016    Obesity, unspecified     SOFIYA on CPAP     S/P CABG x 3     Type II diabetes mellitus with renal manifestations      Past Surgical History:   Procedure Laterality Date    BACK SURGERY      CARDIAC CATHETERIZATION      COLONOSCOPY N/A 7/3/2014    Performed by [facility]ruff [facility] at [facility]    CORONARY ANGIOPLASTY      CORONARY ARTERY BYPASS GRAFT       Family History   Problem Relation Age of Onset    Diabetes Mother     Hypertension Father     Stroke Sister     Diabetes  Sister     Heart disease Brother     Diabetes Brother     Hypertension Brother      Social History     Socioeconomic History    Marital status:      Spouse name: Not on file    Number of children: Not on file    Years of education: Not on file    Highest education level: Not on file   Social Needs    Financial resource strain: Not on file    Food insecurity - worry: Not on file    Food insecurity - inability: Not on file    Transportation needs - medical: Not on file    Transportation needs - non-medical: Not on file   Occupational History    Not on file   Tobacco Use    Smoking status: Never Smoker    Smokeless tobacco: Never Used   Substance and Sexual Activity    Alcohol use: No     Frequency: Never    Drug use: No    Sexual activity: Yes     Partners: Female   Other Topics Concern    Not on file   Social History Narrative    Not on file        Medication List           Accurate as of 10/5/18 11:59 PM. If you have any questions, ask your nurse or doctor.               CONTINUE taking these medications    ACCU-CHEK MULTICLIX LANCET Misc  Generic drug:  lancets  TEST DAILY AS DIRECTED     amLODIPine 5 MG tablet  Commonly known as:  NORVASC  Take 1 tablet (5 mg total) by mouth once daily.     aspirin 81 MG Chew     BRILINTA 90 mg tablet  Generic drug:  ticagrelor  TAKE ONE TABLET BY MOUTH TWICE DAILY     clindamycin 300 MG capsule  Commonly known as:  CLEOCIN  Take 1 capsule (300 mg total) by mouth every 8 (eight) hours. for 7 days     FREESTYLE LITE STRIPS Strp  Generic drug:  blood sugar diagnostic     furosemide 20 MG tablet  Commonly known as:  LASIX  TAKE ONE TABLET BY MOUTH DAILY     isosorbide mononitrate 30 MG 24 hr tablet  Commonly known as:  IMDUR  TAKE ONE TABLET BY MOUTH EVERY DAY     levothyroxine 75 MCG tablet  Commonly known as:  SYNTHROID  Take 1 tablet (75 mcg total) by mouth once daily.     metoprolol tartrate 100 MG tablet  Commonly known as:  LOPRESSOR  TAKE ONE TABLET  "BY MOUTH TWICE DAILY     nitroGLYCERIN 0.4 MG SL tablet  Commonly known as:  NITROSTAT  PLACE ONE TABLET UNDER THE TONGUE EVERY FIVE MINUTES AS NEEDED FOR CHEST PAIN     predniSONE 20 MG tablet  Commonly known as:  DELTASONE  Two per day for 3 days, then one per day for three days, and then half per day till gone.     RANEXA 500 MG Tb12  Generic drug:  ranolazine  TAKE ONE TABLET BY MOUTH TWICE DAILY     SURE COMFORT PEN NEEDLE 31 gauge x 5/16" Ndle  Generic drug:  pen needle, diabetic  AS DIRECTED     TOUJEO SOLOSTAR U-300 INSULIN 300 unit/mL (1.5 mL) Inpn pen  Generic drug:  insulin glargine (TOUJEO)     TRULICITY 1.5 mg/0.5 mL Pnij  Generic drug:  dulaglutide        STOP taking these medications    oxyCODONE 5 MG immediate release tablet  Commonly known as:  ROXICODONE  Stopped by:  Blayne Javier NP           Where to Get Your Medications      These medications were sent to [facility], [address] [facility] [address]    Phone:  [phone]   · nitroGLYCERIN 0.4 MG SL tablet       Review of patient's allergies indicates:   Allergen Reactions    No known drug allergies      Review of Systems   Constitution: Negative for chills and fever.   Musculoskeletal: Positive for gout, joint pain and joint swelling.   Gastrointestinal: Negative for abdominal pain, diarrhea, nausea and vomiting.       Objective:   Body mass index is 31.6 kg/m².  Vitals:    10/05/18 1108   BP: 138/75   Pulse: 69       General: Christiano is well-developed, well-nourished, appears stated age, in no acute distress, alert and oriented to time, place and person.       General    Nursing note and vitals reviewed.  Constitutional: He is oriented to person, place, and time. He appears well-developed and well-nourished.   HENT:   Head: Atraumatic.   Eyes: EOM are normal.   Neck: Neck supple.   Pulmonary/Chest: Effort normal.   Neurological: He is alert and oriented to person, " place, and time.   Psychiatric: He has a normal mood and affect. His behavior is normal.         Right Ankle/Foot Exam     Swelling   The patient is swollen on the great toe interphalangeal joint and great toe metatarsophalangeal joint.    Tenderness   The patient is tender to palpation of the great toe interphalangeal joint and great toe metatarsophalangeal joint.    Comments:  Pain and swelling over R great toe (MCP and IP joints)    Right Elbow Exam     Swelling   The patient is swollen on the olecranon and triceps insertion    Range of Motion   Extension: 0   Flexion: 130     Tests   Tennis Elbow: negative  Golfer's Elbow: negative    Other   Sensation: normal    Comments:  Patient had diffuse swelling over posterior elbow joint. There is no area of fluctuance or significant pain w/ palpation.     No erythema. Skin is warm to the touch.         Left Elbow Exam     Swelling   The patient is swollen on the olecranon and triceps insertion    Range of Motion   Extension: 0   Flexion: 130     Tests   Tennis Elbow: negative  Golfer's Elbow: negative    Other   Sensation: normal    Comments:  Patient had diffuse swelling over posterior elbow joint. There is no area of fluctuance or significant pain w/ palpation.     No erythema. Skin is warm to the touch.           Muscle Strength   Right Upper Extremity   Elbow Pronation:  4/5   Elbow Supination:  4/5   Elbow Extension: 4/5  Elbow Flexion: 4/5  Left Upper Extremity  Elbow Pronation:  4/5   Elbow Supination:  4/5   Elbow Extension: 4/5  Elbow Flexion: 4/5    Vascular Exam     Right Pulses      Radial:                    2+      Left Pulses      Radial:                    2+      Capillary Refill  Right Hand: normal capillary refill  Left Hand: normal capillary refill      EXAMINATION:  XR ELBOW COMPLETE 3 VIEW BILATERAL    CLINICAL HISTORY:  Pain    TECHNIQUE:  AP, lateral, and oblique views of bilateral elbow were performed.    COMPARISON:  September 28,  2018    FINDINGS:  No osseous abnormality appreciated.  Dorsal medial soft tissue edema remains with prominence over the olecranon.  Correlate for cellulitis of possible olecranon bursitis.      Impression       As above         Assessment:     Encounter Diagnoses   Name Primary?    Olecranon bursitis of right elbow Yes    Olecranon bone spur     Olecranon bursitis, left elbow     History of gout         Plan:   1. Patient was educated to apply ice on elbow joint (posterior) and to avoid leaning and relying on UE to get out of chair  2. He will complete current Rx of steroids  3. Referral to Rheumatology for further workup and treatment recommendations concerning gout  4. Follow-up in 1 month to recheck both elbows

## 2018-10-05 ENCOUNTER — HOSPITAL ENCOUNTER (OUTPATIENT)
Dept: RADIOLOGY | Facility: HOSPITAL | Age: 58
Discharge: HOME | End: 2018-10-05
Attending: PHYSICIAN ASSISTANT
Payer: COMMERCIAL

## 2018-10-05 ENCOUNTER — OFFICE VISIT (OUTPATIENT)
Dept: ORTHOPEDICS | Facility: CLINIC | Age: 58
End: 2018-10-05
Payer: COMMERCIAL

## 2018-10-05 ENCOUNTER — TELEPHONE (OUTPATIENT)
Dept: CARDIOLOGY | Facility: HOSPITAL | Age: 58
End: 2018-10-05

## 2018-10-05 ENCOUNTER — OFFICE VISIT (OUTPATIENT)
Dept: INTERNAL MEDICINE | Facility: CLINIC | Age: 58
End: 2018-10-05
Payer: COMMERCIAL

## 2018-10-05 VITALS
OXYGEN SATURATION: 98 % | BODY MASS INDEX: 32.19 KG/M2 | HEIGHT: 72 IN | DIASTOLIC BLOOD PRESSURE: 82 MMHG | SYSTOLIC BLOOD PRESSURE: 136 MMHG | HEART RATE: 74 BPM | WEIGHT: 237.63 LBS | TEMPERATURE: 98 F

## 2018-10-05 VITALS
HEIGHT: 72 IN | WEIGHT: 233 LBS | HEART RATE: 69 BPM | DIASTOLIC BLOOD PRESSURE: 75 MMHG | BODY MASS INDEX: 31.56 KG/M2 | SYSTOLIC BLOOD PRESSURE: 138 MMHG

## 2018-10-05 DIAGNOSIS — M25.521 BILATERAL ELBOW JOINT PAIN: Primary | ICD-10-CM

## 2018-10-05 DIAGNOSIS — Z87.39 HISTORY OF GOUT: ICD-10-CM

## 2018-10-05 DIAGNOSIS — M25.521 BILATERAL ELBOW JOINT PAIN: ICD-10-CM

## 2018-10-05 DIAGNOSIS — M70.22 OLECRANON BURSITIS, LEFT ELBOW: ICD-10-CM

## 2018-10-05 DIAGNOSIS — M25.522 BILATERAL ELBOW JOINT PAIN: Primary | ICD-10-CM

## 2018-10-05 DIAGNOSIS — E11.8 TYPE 2 DIABETES MELLITUS WITH COMPLICATION, UNSPECIFIED WHETHER LONG TERM INSULIN USE: ICD-10-CM

## 2018-10-05 DIAGNOSIS — M25.729 OLECRANON BONE SPUR: ICD-10-CM

## 2018-10-05 DIAGNOSIS — M70.21 OLECRANON BURSITIS OF RIGHT ELBOW: Primary | ICD-10-CM

## 2018-10-05 DIAGNOSIS — M25.522 BILATERAL ELBOW JOINT PAIN: ICD-10-CM

## 2018-10-05 DIAGNOSIS — M10.00 ACUTE IDIOPATHIC GOUT, UNSPECIFIED SITE: Primary | ICD-10-CM

## 2018-10-05 PROCEDURE — 99214 OFFICE O/P EST MOD 30 MIN: CPT | Mod: S$GLB,,, | Performed by: PHYSICIAN ASSISTANT

## 2018-10-05 PROCEDURE — 3008F BODY MASS INDEX DOCD: CPT | Mod: CPTII,S$GLB,, | Performed by: PHYSICIAN ASSISTANT

## 2018-10-05 PROCEDURE — 99213 OFFICE O/P EST LOW 20 MIN: CPT | Mod: S$GLB,,, | Performed by: NURSE PRACTITIONER

## 2018-10-05 PROCEDURE — 3045F PR MOST RECENT HEMOGLOBIN A1C LEVEL 7.0-9.0%: CPT | Mod: CPTII,S$GLB,, | Performed by: NURSE PRACTITIONER

## 2018-10-05 PROCEDURE — 99999 PR PBB SHADOW E&M-EST. PATIENT-LVL IV: CPT | Mod: PBBFAC,,, | Performed by: NURSE PRACTITIONER

## 2018-10-05 PROCEDURE — 3075F SYST BP GE 130 - 139MM HG: CPT | Mod: CPTII,S$GLB,, | Performed by: PHYSICIAN ASSISTANT

## 2018-10-05 PROCEDURE — 3008F BODY MASS INDEX DOCD: CPT | Mod: CPTII,S$GLB,, | Performed by: NURSE PRACTITIONER

## 2018-10-05 PROCEDURE — 3075F SYST BP GE 130 - 139MM HG: CPT | Mod: CPTII,S$GLB,, | Performed by: NURSE PRACTITIONER

## 2018-10-05 PROCEDURE — 3079F DIAST BP 80-89 MM HG: CPT | Mod: CPTII,S$GLB,, | Performed by: NURSE PRACTITIONER

## 2018-10-05 PROCEDURE — 73080 X-RAY EXAM OF ELBOW: CPT | Mod: 50,TC

## 2018-10-05 PROCEDURE — 3078F DIAST BP <80 MM HG: CPT | Mod: CPTII,S$GLB,, | Performed by: PHYSICIAN ASSISTANT

## 2018-10-05 PROCEDURE — 73080 X-RAY EXAM OF ELBOW: CPT | Mod: 26,50,, | Performed by: RADIOLOGY

## 2018-10-05 PROCEDURE — 99999 PR PBB SHADOW E&M-EST. PATIENT-LVL IV: CPT | Mod: PBBFAC,,, | Performed by: PHYSICIAN ASSISTANT

## 2018-10-05 RX ORDER — NITROGLYCERIN 0.4 MG/1
TABLET SUBLINGUAL
Qty: 25 TABLET | Refills: 11 | Status: SHIPPED | OUTPATIENT
Start: 2018-10-05 | End: 2019-02-28 | Stop reason: SDUPTHER

## 2018-10-05 NOTE — LETTER
October 11, 2018      Ace Gómez MD  [address]  [address]  [facility] [address]           [facility]  [address] [place] [id number]  Phone: [phone]  Fax: [phone]          Patient: Christiano Cox   MR Number: [id number]   YOB: 1960   Date of Visit: 10/5/2018       Dear Dr. Ace Gómez:    Thank you for referring Christiano Cox to me for evaluation. Attached you will find relevant portions of my assessment and plan of care.    If you have questions, please do not hesitate to call me. I look forward to following Christiano Cox along with you.    Sincerely,    Mindy Ruffin PA-C    Enclosure  CC:  No Recipients    If you would like to receive this communication electronically, please contact [email or web address] or [phone] to request more information on [employer] access.    For providers and/or their staff who would like to refer a patient to [facility], please contact us through our one-stop-shop provider referral line, [facility] , at [phone].    If you feel you have received this communication in error or would no longer like to receive these types of communications, please e-mail [email or web address]

## 2018-10-05 NOTE — PROGRESS NOTES
Subjective:      Patient ID: Christiano Cox is a 58 y.o. male.    Chief Complaint: Follow-up (right arm)    HPI:  Patient is here for a follow up of his bilateral elbow swelling and left great toe swelling.  Is taking his meds as ordered.  Still on the antibiotic and steroids.  Says his am sugars are in the 150's and pm has been in the 300's.  Is on DM meds.  Says has only mild pain to the great toe.  Redness gone, some swelling remains    Past Medical History:   Diagnosis Date    Acute coronary syndrome     Coronary artery disease involving native coronary artery without angina pectoris 9/13/2016    Coronary atherosclerosis of unspecified type of vessel, native or graft     s/p cabg and stents    DM (diabetes mellitus), type 2 with complications     History of gout     Hyperlipidemia associated with type 2 diabetes mellitus     Hypertension associated with diabetes     NSTEMI (non-ST elevated myocardial infarction) 5/16/2016    Obesity, unspecified     SOFIYA on CPAP     S/P CABG x 3     Type II diabetes mellitus with renal manifestations        Past Surgical History:   Procedure Laterality Date    BACK SURGERY      CARDIAC CATHETERIZATION      COLONOSCOPY N/A 7/3/2014    [facility] at [facility]    CORONARY ANGIOPLASTY      CORONARY ARTERY BYPASS GRAFT         Lab Results   Component Value Date    WBC 7.16 09/30/2018    HGB 11.5 (L) 09/30/2018    HCT 32.4 (L) 09/30/2018     09/30/2018    CHOL 138 09/28/2018    TRIG 109 09/28/2018    HDL 26 (L) 09/28/2018    ALT 45 (H) 09/30/2018    AST 34 09/30/2018     09/30/2018    K 4.1 09/30/2018     09/30/2018    CREATININE 1.8 (H) 09/30/2018    BUN 24 (H) 09/30/2018    CO2 23 09/30/2018    TSH 2.895 09/28/2018    PSA 0.70 10/10/2017    INR 1.1 09/28/2018    HGBA1C 7.2 (H) 09/28/2018       /82 (BP Location: Left arm, Patient Position: Sitting, BP Method: Large (Manual))   Pulse 74   Temp 97.5 °F (36.4 °C)  (Tympanic)   Ht 6' (1.829 m)   Wt 107.8 kg (237 lb 10.5 oz)   SpO2 98%   BMI 32.23 kg/m²       Review of Systems   Constitutional: Negative for appetite change, chills, diaphoresis and fever.   HENT: Negative for congestion, ear pain, postnasal drip, rhinorrhea, sneezing, sore throat and trouble swallowing.    Eyes: Negative for photophobia, pain and visual disturbance.   Respiratory: Negative for apnea, cough, choking, chest tightness, shortness of breath and wheezing.    Cardiovascular: Negative for chest pain, palpitations and leg swelling.   Gastrointestinal: Negative for abdominal pain, constipation, diarrhea, nausea and vomiting.   Genitourinary: Negative for decreased urine volume, difficulty urinating, dysuria, hematuria and urgency.   Musculoskeletal: Positive for arthralgias. Negative for gait problem, joint swelling and myalgias.   Skin: Negative for rash.   Neurological: Negative for dizziness, tremors, seizures, syncope, weakness, light-headedness, numbness and headaches.   Psychiatric/Behavioral: Negative for agitation, confusion, decreased concentration, hallucinations and sleep disturbance. The patient is not nervous/anxious.       Objective:     Physical Exam   Constitutional: He is oriented to person, place, and time. He appears well-developed and well-nourished. No distress.   Musculoskeletal:   Normal gait  Bilateral elbows swelling gone, mild swelling to the left side remains, but much improved, no redness noted.  Left great toe is still a little swollen, no redness, is mildly TTP to medial toe.   Neurological: He is alert and oriented to person, place, and time.   Skin: Skin is warm and dry.   Psychiatric: He has a normal mood and affect. His behavior is normal.     Assessment:      1. Acute idiopathic gout, unspecified site    2. Type 2 diabetes mellitus with complication, unspecified whether long term insulin use      Plan:   Acute idiopathic gout, unspecified site    Type 2 diabetes  "mellitus with complication, unspecified whether long term insulin use    is taking 30 u of Toujeo, will increase it to 40 u while on prednisone.  Then return to the 30 u.  Increase water intake.  Is following up with his pcp on Tuesday.        Current Outpatient Medications:     ACCU-CHEK MULTICLIX LANCET lancets, TEST DAILY AS DIRECTED, Disp: 102 each, Rfl: 11    amLODIPine (NORVASC) 5 MG tablet, Take 1 tablet (5 mg total) by mouth once daily., Disp: 90 tablet, Rfl: 3    aspirin 81 MG Chew, Take 81 mg by mouth once daily., Disp: , Rfl:     BRILINTA 90 mg tablet, TAKE ONE TABLET BY MOUTH TWICE DAILY, Disp: 180 tablet, Rfl: 3    clindamycin (CLEOCIN) 300 MG capsule, Take 1 capsule (300 mg total) by mouth every 8 (eight) hours. for 7 days, Disp: 21 capsule, Rfl: 0    FREESTYLE LITE STRIPS Strp, CHECK BLOOD SUGAR FOUR TIMES DAILY, Disp: , Rfl: 3    furosemide (LASIX) 20 MG tablet, TAKE ONE TABLET BY MOUTH DAILY, Disp: 30 tablet, Rfl: 12    isosorbide mononitrate (IMDUR) 30 MG 24 hr tablet, TAKE ONE TABLET BY MOUTH EVERY DAY, Disp: 30 tablet, Rfl: 11    levothyroxine (SYNTHROID) 75 MCG tablet, Take 1 tablet (75 mcg total) by mouth once daily., Disp: 30 tablet, Rfl: 11    metoprolol tartrate (LOPRESSOR) 100 MG tablet, TAKE ONE TABLET BY MOUTH TWICE DAILY, Disp: 180 tablet, Rfl: 2    predniSONE (DELTASONE) 20 MG tablet, Two per day for 3 days, then one per day for three days, and then half per day till gone., Disp: 12 tablet, Rfl: 0    RANEXA 500 mg Tb12, TAKE ONE TABLET BY MOUTH TWICE DAILY, Disp: 180 tablet, Rfl: 3    SURE COMFORT PEN NEEDLE 31 gauge x 5/16" Ndle, AS DIRECTED, Disp: 100 each, Rfl: 3    TOUJEO SOLOSTAR U-300 INSULIN 300 unit/mL (1.5 mL) InPn pen, INJECT 60 UNITS SUBCUTANEOUSLY ONCE A DAY, Disp: , Rfl: 5    TRULICITY 1.5 mg/0.5 mL PnIj, INJECT 1.5 MG SUBCUTANEOUSLY ONCE WEEKLY, Disp: , Rfl: 5    nitroGLYCERIN (NITROSTAT) 0.4 MG SL tablet, PLACE ONE TABLET UNDER THE TONGUE EVERY FIVE " MINUTES AS NEEDED FOR CHEST PAIN, Disp: 25 tablet, Rfl: 11

## 2018-10-05 NOTE — TELEPHONE ENCOUNTER
----- Message from Tisha Golden MA sent at 10/5/2018 10:46 AM CDT -----  Pt did not  Nitro Rx in August so he will need another refill sent to pharmacy so he can  Rx.   ----- Message -----  From: Leslie Cox  Sent: 10/5/2018   9:57 AM  To: Chace Geiger Staff    Patient needs call back rg status of rx refill..[phone]

## 2018-10-09 ENCOUNTER — OFFICE VISIT (OUTPATIENT)
Dept: INTERNAL MEDICINE | Facility: CLINIC | Age: 58
End: 2018-10-09
Payer: COMMERCIAL

## 2018-10-09 VITALS
RESPIRATION RATE: 16 BRPM | HEIGHT: 71 IN | OXYGEN SATURATION: 98 % | SYSTOLIC BLOOD PRESSURE: 120 MMHG | DIASTOLIC BLOOD PRESSURE: 76 MMHG | HEART RATE: 78 BPM | TEMPERATURE: 98 F | BODY MASS INDEX: 32.72 KG/M2 | WEIGHT: 233.69 LBS

## 2018-10-09 DIAGNOSIS — E11.21 TYPE 2 DIABETES MELLITUS WITH DIABETIC NEPHROPATHY, WITH LONG-TERM CURRENT USE OF INSULIN: ICD-10-CM

## 2018-10-09 DIAGNOSIS — Z79.4 TYPE 2 DIABETES MELLITUS WITH DIABETIC NEPHROPATHY, WITH LONG-TERM CURRENT USE OF INSULIN: ICD-10-CM

## 2018-10-09 DIAGNOSIS — N18.30 CKD STAGE 3 DUE TO TYPE 2 DIABETES MELLITUS: ICD-10-CM

## 2018-10-09 DIAGNOSIS — E11.22 CKD STAGE 3 DUE TO TYPE 2 DIABETES MELLITUS: ICD-10-CM

## 2018-10-09 DIAGNOSIS — Z87.39 HISTORY OF GOUT: Primary | ICD-10-CM

## 2018-10-09 DIAGNOSIS — I15.2 HYPERTENSION ASSOCIATED WITH DIABETES: ICD-10-CM

## 2018-10-09 DIAGNOSIS — E11.59 HYPERTENSION ASSOCIATED WITH DIABETES: ICD-10-CM

## 2018-10-09 DIAGNOSIS — N18.30 CHRONIC KIDNEY DISEASE (CKD), STAGE III (MODERATE): ICD-10-CM

## 2018-10-09 PROCEDURE — 3074F SYST BP LT 130 MM HG: CPT | Mod: CPTII,S$GLB,, | Performed by: INTERNAL MEDICINE

## 2018-10-09 PROCEDURE — 3078F DIAST BP <80 MM HG: CPT | Mod: CPTII,S$GLB,, | Performed by: INTERNAL MEDICINE

## 2018-10-09 PROCEDURE — 3008F BODY MASS INDEX DOCD: CPT | Mod: CPTII,S$GLB,, | Performed by: INTERNAL MEDICINE

## 2018-10-09 PROCEDURE — 3045F PR MOST RECENT HEMOGLOBIN A1C LEVEL 7.0-9.0%: CPT | Mod: CPTII,S$GLB,, | Performed by: INTERNAL MEDICINE

## 2018-10-09 PROCEDURE — 99213 OFFICE O/P EST LOW 20 MIN: CPT | Mod: S$GLB,,, | Performed by: INTERNAL MEDICINE

## 2018-10-09 PROCEDURE — 99999 PR PBB SHADOW E&M-EST. PATIENT-LVL III: CPT | Mod: PBBFAC,,, | Performed by: INTERNAL MEDICINE

## 2018-10-09 RX ORDER — FEBUXOSTAT 40 MG/1
40 TABLET, FILM COATED ORAL DAILY
Qty: 30 TABLET | Refills: 11 | Status: SHIPPED | OUTPATIENT
Start: 2018-10-09 | End: 2019-03-12

## 2018-10-09 NOTE — PROGRESS NOTES
"HPI:  Patient is a 58-year-old man who could could comes in today for continued follow-up of his flare of gout.  Patient was started on steroids about 7 days ago.  He has had a dramatic improvement and the flare of the gout in the elbows and also in his right great toe he still has another 4-5 days of steroids ago.    Current meds have been verified and updated per the EMR  Exam:/76   Pulse 78   Temp 97.7 °F (36.5 °C)   Resp 16   Ht 5' 11" (1.803 m)   Wt 106 kg (233 lb 11 oz)   SpO2 98%   BMI 32.59 kg/m²   The right elbow effusion has completely resolved.  There is still some mild effusion in the left olecranon bursa.  There is no warmth erythema.  The left great toe is still has some mild swelling but there is no erythema or warmth.  He does have some tenderness with range of motion.  Lab Results   Component Value Date    WBC 7.16 09/30/2018    HGB 11.5 (L) 09/30/2018    HCT 32.4 (L) 09/30/2018     09/30/2018    CHOL 138 09/28/2018    TRIG 109 09/28/2018    HDL 26 (L) 09/28/2018    ALT 45 (H) 09/30/2018    AST 34 09/30/2018     09/30/2018    K 4.1 09/30/2018     09/30/2018    CREATININE 1.8 (H) 09/30/2018    BUN 24 (H) 09/30/2018    CO2 23 09/30/2018    TSH 2.895 09/28/2018    PSA 0.70 10/10/2017    INR 1.1 09/28/2018    HGBA1C 7.2 (H) 09/28/2018       Impression:  Acute gout  Patient Active Problem List   Diagnosis    Coronary atherosclerosis    DM (diabetes mellitus), type 2 with complications    SOFIYA on CPAP    Type II diabetes mellitus with renal manifestations    Hypertension associated with diabetes    Hyperlipidemia associated with type 2 diabetes mellitus    History of gout    Lumbar radiculopathy    Olecranon bone spur    Olecranon bursitis of right elbow    Coronary artery disease involving native coronary artery without angina pectoris    Central sleep apnea due to Cheyne-Pérez respiration    Periodic limb movement disorder (PLMD)    Restless legs syndrome " (RLS)    Shift work sleep disorder    Poor sleep hygiene    Cellulitis of right elbow    TATY (acute kidney injury)       Plan:  Orders Placed This Encounter    febuxostat (ULORIC) 40 mg Tab    Patient will now go ahead and start on Uloric 40 mg daily.  He will finish out the steroid taper.  He will call me if he has any recurrence

## 2018-11-09 ENCOUNTER — TELEPHONE (OUTPATIENT)
Dept: CARDIOLOGY | Facility: CLINIC | Age: 58
End: 2018-11-09

## 2018-11-09 NOTE — TELEPHONE ENCOUNTER
Called to pt was unable to speak to pt at this time phone call rolled over to wife number tried top contact pt twice, left message with pt wife Layla to have pt call office back.

## 2018-11-09 NOTE — TELEPHONE ENCOUNTER
----- Message from Brooke Morrissey sent at 11/9/2018  8:26 AM CST -----  Contact: [phone]  Patient states he out of town an need a RX medication . Please call back at [phone]. Thanks shirley Marroquin.

## 2018-11-20 RX ORDER — ISOSORBIDE MONONITRATE 30 MG/1
TABLET, EXTENDED RELEASE ORAL
Qty: 30 TABLET | Refills: 11 | Status: SHIPPED | OUTPATIENT
Start: 2018-11-20 | End: 2019-02-20

## 2018-11-20 RX ORDER — METOPROLOL TARTRATE 100 MG/1
TABLET ORAL
Qty: 180 TABLET | Refills: 2 | Status: SHIPPED | OUTPATIENT
Start: 2018-11-20 | End: 2019-07-19 | Stop reason: ALTCHOICE

## 2018-12-11 ENCOUNTER — TELEPHONE (OUTPATIENT)
Dept: INTERNAL MEDICINE | Facility: CLINIC | Age: 58
End: 2018-12-11

## 2018-12-11 NOTE — TELEPHONE ENCOUNTER
----- Message from Lauren Cleveland sent at 12/11/2018  2:53 PM CST -----  Contact: self  Requesting call back regarding questions about getting an echocardiogram. Please call back at [phone].    Thanks,  Lauren Cleveland

## 2018-12-17 ENCOUNTER — CLINICAL SUPPORT (OUTPATIENT)
Dept: CARDIOLOGY | Facility: CLINIC | Age: 58
End: 2018-12-17
Attending: NURSE PRACTITIONER
Payer: COMMERCIAL

## 2018-12-17 DIAGNOSIS — I34.0 NON-RHEUMATIC MITRAL REGURGITATION: ICD-10-CM

## 2018-12-17 LAB
DIASTOLIC DYSFUNCTION: YES
ESTIMATED PA SYSTOLIC PRESSURE: 27.01
MITRAL VALVE REGURGITATION: ABNORMAL
RETIRED EF AND QEF - SEE NOTES: 30 (ref 55–65)

## 2018-12-17 PROCEDURE — 93306 TTE W/DOPPLER COMPLETE: CPT | Mod: S$GLB,,, | Performed by: NUCLEAR MEDICINE

## 2018-12-27 NOTE — CLINICAL REVIEW
Christiano Cox [id number] is a 58 y.o. male who has been consulted for vancomycin dosing.    Dx: Cellulitis of elbow  Goal trough: 10-15     The patient has the following labs:     Date Creatinine (mg/dl)    BUN WBC Count   9/28/2018 Estimated Creatinine Clearance: 37.5 mL/min (A) (based on SCr of 2.7 mg/dL (H)). Lab Results   Component Value Date    BUN 56 (H) 09/28/2018     Lab Results   Component Value Date    WBC 6.27 09/28/2018        Current weight is 108.3 kg (238 lb 12.1 oz)    The patient will be started on vancomycin at a dose of 1,500 mg one time. Patient will be PULSE DOSED due to Scr of 2.7. Placeholder dose of 1,000 mg every 48 hours to be given/adjusted based on AM random. Patient will be followed by pharmacy for changes in renal function, toxicity, and efficacy.  The vancomycin random has been ordered for 9/29 at 0430.      Thank you for allowing us to participate in this patient's care.     Rosa Waite        no

## 2019-02-12 ENCOUNTER — OFFICE VISIT (OUTPATIENT)
Dept: CARDIOLOGY | Facility: CLINIC | Age: 59
End: 2019-02-12
Payer: COMMERCIAL

## 2019-02-12 VITALS
WEIGHT: 238.13 LBS | SYSTOLIC BLOOD PRESSURE: 164 MMHG | HEART RATE: 73 BPM | DIASTOLIC BLOOD PRESSURE: 92 MMHG | BODY MASS INDEX: 33.34 KG/M2 | HEIGHT: 71 IN

## 2019-02-12 DIAGNOSIS — I25.10 CORONARY ARTERY DISEASE INVOLVING NATIVE CORONARY ARTERY OF NATIVE HEART WITHOUT ANGINA PECTORIS: Primary | ICD-10-CM

## 2019-02-12 DIAGNOSIS — E78.5 HYPERLIPIDEMIA ASSOCIATED WITH TYPE 2 DIABETES MELLITUS: ICD-10-CM

## 2019-02-12 DIAGNOSIS — E11.8 TYPE 2 DIABETES MELLITUS WITH COMPLICATION, UNSPECIFIED WHETHER LONG TERM INSULIN USE: ICD-10-CM

## 2019-02-12 DIAGNOSIS — E11.69 HYPERLIPIDEMIA ASSOCIATED WITH TYPE 2 DIABETES MELLITUS: ICD-10-CM

## 2019-02-12 DIAGNOSIS — G47.33 OSA ON CPAP: ICD-10-CM

## 2019-02-12 DIAGNOSIS — I15.2 HYPERTENSION ASSOCIATED WITH DIABETES: ICD-10-CM

## 2019-02-12 DIAGNOSIS — E11.22 CKD STAGE 3 DUE TO TYPE 2 DIABETES MELLITUS: ICD-10-CM

## 2019-02-12 DIAGNOSIS — E11.59 HYPERTENSION ASSOCIATED WITH DIABETES: ICD-10-CM

## 2019-02-12 DIAGNOSIS — N18.30 CKD STAGE 3 DUE TO TYPE 2 DIABETES MELLITUS: ICD-10-CM

## 2019-02-12 DIAGNOSIS — I25.10 CAD (CORONARY ARTERY DISEASE): ICD-10-CM

## 2019-02-12 PROCEDURE — 99999 PR PBB SHADOW E&M-EST. PATIENT-LVL III: ICD-10-PCS | Mod: PBBFAC,,, | Performed by: NURSE PRACTITIONER

## 2019-02-12 PROCEDURE — 3045F PR MOST RECENT HEMOGLOBIN A1C LEVEL 7.0-9.0%: CPT | Mod: CPTII,S$GLB,, | Performed by: NURSE PRACTITIONER

## 2019-02-12 PROCEDURE — 99999 PR PBB SHADOW E&M-EST. PATIENT-LVL III: CPT | Mod: PBBFAC,,, | Performed by: NURSE PRACTITIONER

## 2019-02-12 PROCEDURE — 3077F PR MOST RECENT SYSTOLIC BLOOD PRESSURE >= 140 MM HG: ICD-10-PCS | Mod: CPTII,S$GLB,, | Performed by: NURSE PRACTITIONER

## 2019-02-12 PROCEDURE — 99214 OFFICE O/P EST MOD 30 MIN: CPT | Mod: S$GLB,,, | Performed by: NURSE PRACTITIONER

## 2019-02-12 PROCEDURE — 93000 ELECTROCARDIOGRAM COMPLETE: CPT | Mod: S$GLB,,, | Performed by: INTERNAL MEDICINE

## 2019-02-12 PROCEDURE — 3080F PR MOST RECENT DIASTOLIC BLOOD PRESSURE >= 90 MM HG: ICD-10-PCS | Mod: CPTII,S$GLB,, | Performed by: NURSE PRACTITIONER

## 2019-02-12 PROCEDURE — 99214 PR OFFICE/OUTPT VISIT, EST, LEVL IV, 30-39 MIN: ICD-10-PCS | Mod: S$GLB,,, | Performed by: NURSE PRACTITIONER

## 2019-02-12 PROCEDURE — 3008F BODY MASS INDEX DOCD: CPT | Mod: CPTII,S$GLB,, | Performed by: NURSE PRACTITIONER

## 2019-02-12 PROCEDURE — 93000 EKG 12-LEAD: ICD-10-PCS | Mod: S$GLB,,, | Performed by: INTERNAL MEDICINE

## 2019-02-12 PROCEDURE — 3080F DIAST BP >= 90 MM HG: CPT | Mod: CPTII,S$GLB,, | Performed by: NURSE PRACTITIONER

## 2019-02-12 PROCEDURE — 3008F PR BODY MASS INDEX (BMI) DOCUMENTED: ICD-10-PCS | Mod: CPTII,S$GLB,, | Performed by: NURSE PRACTITIONER

## 2019-02-12 PROCEDURE — 3045F PR MOST RECENT HEMOGLOBIN A1C LEVEL 7.0-9.0%: ICD-10-PCS | Mod: CPTII,S$GLB,, | Performed by: NURSE PRACTITIONER

## 2019-02-12 PROCEDURE — 3077F SYST BP >= 140 MM HG: CPT | Mod: CPTII,S$GLB,, | Performed by: NURSE PRACTITIONER

## 2019-02-12 NOTE — PROGRESS NOTES
Subjective:   Patient ID:  Christiano Cox is a 59 y.o. male who presents for follow up of Coronary Artery Disease; Hypertension; and Hyperlipidemia      HPI  Patient presents to clinic for follow up and management of CAD. He has PMH CAD, NSTEMI, sleep apnea in which he uses CPAP nightly, DM, HLP, HTN, CABG x3. He had 2D echo that shows decline in EF from 45% in 2016 to 35% in May 2017.  Patient underwent angiogram on 5/16/16 for NSTEMI that showed occluded rca, lad and lcx, occluded svg to pda, severe proximal stenosis of the svg to om treated with resolute stent and filter wire protection. Also noted to have patent lima to lad and Normal filling pressure. Successful PCI with GRAHAM to SVG to OM1. Uses CPAP nightly. Denies any chest pain, GOODMAN, angina equivalent,  orthopnea, PND, edema, dizziness, near syncope or syncope. Has no abnormal bleeding on DAPT. Has good activity tolerance. A1C in Sept 2018 was 7.2. Renal function stable at that time. Using SL nitro PRN.   EKG today shows NSR with PVCs.   Echo in Dec 2018 showed moderate LV enlargement, LVEF 30-35%, DD, mild to moderate MR and ME with old WMA.     Past Medical History:   Diagnosis Date    Acute coronary syndrome     CKD stage 3 due to type 2 diabetes mellitus     Coronary artery disease involving native coronary artery without angina pectoris 9/13/2016    Coronary atherosclerosis of unspecified type of vessel, native or graft     s/p cabg and stents    DM (diabetes mellitus), type 2 with complications     History of gout     Hyperlipidemia associated with type 2 diabetes mellitus     Hypertension associated with diabetes     NSTEMI (non-ST elevated myocardial infarction) 5/16/2016    Obesity, unspecified     SOFIYA on CPAP     S/P CABG x 3     Type II diabetes mellitus with renal manifestations        Past Surgical History:   Procedure Laterality Date    BACK SURGERY      CARDIAC CATHETERIZATION      COLONOSCOPY N/A 7/3/2014    Performed by  "Pipo Woodruff [facility] at [facility] END[facility]    CORONARY ANGIOPLASTY      CORONARY ARTERY BYPASS GRAFT         Social History     Tobacco Use    Smoking status: Never Smoker    Smokeless tobacco: Never Used   Substance Use Topics    Alcohol use: No     Frequency: Never    Drug use: No       Family History   Problem Relation Age of Onset    Diabetes Mother     Hypertension Father     Stroke Sister     Diabetes Sister     Heart disease Brother     Diabetes Brother     Hypertension Brother        Current Outpatient Medications   Medication Sig    ACCU-CHEK MULTICLIX LANCET lancets TEST DAILY AS DIRECTED    amLODIPine (NORVASC) 5 MG tablet Take 1 tablet (5 mg total) by mouth once daily.    aspirin 81 MG Chew Take 81 mg by mouth once daily.    BRILINTA 90 mg tablet TAKE ONE TABLET BY MOUTH TWICE DAILY    FREESTYLE LITE STRIPS Strp CHECK BLOOD SUGAR FOUR TIMES DAILY    furosemide (LASIX) 20 MG tablet TAKE ONE TABLET BY MOUTH DAILY    isosorbide mononitrate (IMDUR) 30 MG 24 hr tablet TAKE ONE TABLET BY MOUTH EVERY DAY    levothyroxine (SYNTHROID) 75 MCG tablet Take 1 tablet (75 mcg total) by mouth once daily.    metoprolol tartrate (LOPRESSOR) 100 MG tablet TAKE ONE TABLET BY MOUTH TWICE DAILY    nitroGLYCERIN (NITROSTAT) 0.4 MG SL tablet PLACE ONE TABLET UNDER THE TONGUE EVERY FIVE MINUTES AS NEEDED FOR CHEST PAIN    RANEXA 500 mg Tb12 TAKE ONE TABLET BY MOUTH TWICE DAILY    SURE COMFORT PEN NEEDLE 31 gauge x 5/16" Ndle AS DIRECTED    TOUJEO SOLOSTAR U-300 INSULIN 300 unit/mL (1.5 mL) InPn pen INJECT 60 UNITS SUBCUTANEOUSLY ONCE A DAY    TRULICITY 1.5 mg/0.5 mL PnIj INJECT 1.5 MG SUBCUTANEOUSLY ONCE WEEKLY    febuxostat (ULORIC) 40 mg Tab Take 1 tablet (40 mg total) by mouth once daily.    predniSONE (DELTASONE) 20 MG tablet Two per day for 3 days, then one per day for three days, and then half per day till gone.     No current facility-administered medications for this visit.      Current Outpatient " "Medications on File Prior to Visit   Medication Sig    ACCU-CHEK MULTICLIX LANCET lancets TEST DAILY AS DIRECTED    amLODIPine (NORVASC) 5 MG tablet Take 1 tablet (5 mg total) by mouth once daily.    aspirin 81 MG Chew Take 81 mg by mouth once daily.    BRILINTA 90 mg tablet TAKE ONE TABLET BY MOUTH TWICE DAILY    FREESTYLE LITE STRIPS Strp CHECK BLOOD SUGAR FOUR TIMES DAILY    furosemide (LASIX) 20 MG tablet TAKE ONE TABLET BY MOUTH DAILY    isosorbide mononitrate (IMDUR) 30 MG 24 hr tablet TAKE ONE TABLET BY MOUTH EVERY DAY    levothyroxine (SYNTHROID) 75 MCG tablet Take 1 tablet (75 mcg total) by mouth once daily.    metoprolol tartrate (LOPRESSOR) 100 MG tablet TAKE ONE TABLET BY MOUTH TWICE DAILY    nitroGLYCERIN (NITROSTAT) 0.4 MG SL tablet PLACE ONE TABLET UNDER THE TONGUE EVERY FIVE MINUTES AS NEEDED FOR CHEST PAIN    RANEXA 500 mg Tb12 TAKE ONE TABLET BY MOUTH TWICE DAILY    SURE COMFORT PEN NEEDLE 31 gauge x 5/16" Ndle AS DIRECTED    TOUJEO SOLOSTAR U-300 INSULIN 300 unit/mL (1.5 mL) InPn pen INJECT 60 UNITS SUBCUTANEOUSLY ONCE A DAY    TRULICITY 1.5 mg/0.5 mL PnIj INJECT 1.5 MG SUBCUTANEOUSLY ONCE WEEKLY    febuxostat (ULORIC) 40 mg Tab Take 1 tablet (40 mg total) by mouth once daily.    predniSONE (DELTASONE) 20 MG tablet Two per day for 3 days, then one per day for three days, and then half per day till gone.     No current facility-administered medications on file prior to visit.        Review of Systems   Constitution: Negative for diaphoresis, weakness, malaise/fatigue, weight gain and weight loss.   HENT: Negative for congestion and nosebleeds.    Cardiovascular: Negative for chest pain, claudication, cyanosis, dyspnea on exertion, irregular heartbeat, leg swelling, near-syncope, orthopnea, palpitations, paroxysmal nocturnal dyspnea and syncope.   Respiratory: Negative for cough, hemoptysis, shortness of breath, sleep disturbances due to breathing, snoring, sputum production and " "wheezing.    Hematologic/Lymphatic: Negative for bleeding problem. Does not bruise/bleed easily.   Skin: Negative for rash.   Musculoskeletal: Negative for arthritis, back pain, falls, joint pain, muscle cramps and muscle weakness.   Gastrointestinal: Negative for abdominal pain, constipation, diarrhea, heartburn, hematemesis, hematochezia, melena and nausea.   Genitourinary: Negative for dysuria, hematuria and nocturia.   Neurological: Negative for excessive daytime sleepiness, dizziness, headaches, light-headedness, loss of balance, numbness and vertigo.       Objective:   Physical Exam   Constitutional: He is oriented to person, place, and time. He appears well-developed and well-nourished.   Neck: Neck supple. No JVD present.   Cardiovascular: Normal rate, regular rhythm and normal pulses.  Occasional extrasystoles are present. Exam reveals no friction rub.   Murmur heard.  High-pitched blowing holosystolic murmur is present at the apex.  Pulmonary/Chest: Effort normal and breath sounds normal. No respiratory distress. He has no wheezes. He has no rales.   Sternal scar    Abdominal: Soft. Bowel sounds are normal. He exhibits no distension.   Musculoskeletal: He exhibits edema ( trace BLE  ). He exhibits no tenderness.   Neurological: He is alert and oriented to person, place, and time.   Skin: Skin is warm and dry. No rash noted.   Psychiatric: He has a normal mood and affect. His behavior is normal.   Nursing note and vitals reviewed.    Vitals:    02/12/19 1627   BP: (!) 164/92   Pulse: 73   Weight: 108 kg (238 lb 1.6 oz)   Height: 5' 11" (1.803 m)     Lab Results   Component Value Date    CHOL 138 09/28/2018    CHOL 155 10/10/2017    CHOL 161 06/05/2017     Lab Results   Component Value Date    HDL 26 (L) 09/28/2018    HDL 36 (L) 10/10/2017    HDL 36 06/05/2017     Lab Results   Component Value Date    LDLCALC 90.2 09/28/2018    LDLCALC 100.4 10/10/2017    LDLCALC 103 06/05/2017     Lab Results   Component " Value Date    TRIG 109 09/28/2018    TRIG 93 10/10/2017    TRIG 108 06/05/2017     Lab Results   Component Value Date    CHOLHDL 18.8 (L) 09/28/2018    CHOLHDL 23.2 10/10/2017    CHOLHDL 21.6 02/14/2017       Chemistry        Component Value Date/Time     09/30/2018 0531    K 4.1 09/30/2018 0531     09/30/2018 0531    CO2 23 09/30/2018 0531    BUN 24 (H) 09/30/2018 0531    CREATININE 1.8 (H) 09/30/2018 0531    GLU 92 09/30/2018 0531        Component Value Date/Time    CALCIUM 9.8 09/30/2018 0531    ALKPHOS 37 (L) 09/30/2018 0531    AST 34 09/30/2018 0531    ALT 45 (H) 09/30/2018 0531    BILITOT 0.4 09/30/2018 0531    ESTGFRAFRICA 47 (A) 09/30/2018 0531    EGFRNONAA 40 (A) 09/30/2018 0531          Lab Results   Component Value Date    TSH 2.895 09/28/2018     Lab Results   Component Value Date    INR 1.1 09/28/2018    INR 1.0 05/15/2016     Lab Results   Component Value Date    WBC 7.16 09/30/2018    HGB 11.5 (L) 09/30/2018    HCT 32.4 (L) 09/30/2018    MCV 84 09/30/2018     09/30/2018     BMP  Sodium   Date Value Ref Range Status   09/30/2018 139 136 - 145 mmol/L Final     Potassium   Date Value Ref Range Status   09/30/2018 4.1 3.5 - 5.1 mmol/L Final     Chloride   Date Value Ref Range Status   09/30/2018 106 95 - 110 mmol/L Final     CO2   Date Value Ref Range Status   09/30/2018 23 23 - 29 mmol/L Final     BUN, Bld   Date Value Ref Range Status   09/30/2018 24 (H) 6 - 20 mg/dL Final     Creatinine   Date Value Ref Range Status   09/30/2018 1.8 (H) 0.5 - 1.4 mg/dL Final     Calcium   Date Value Ref Range Status   09/30/2018 9.8 8.7 - 10.5 mg/dL Final     Anion Gap   Date Value Ref Range Status   09/30/2018 10 8 - 16 mmol/L Final     eGFR if    Date Value Ref Range Status   09/30/2018 47 (A) >60 mL/min/1.73 m^2 Final     eGFR if non    Date Value Ref Range Status   09/30/2018 40 (A) >60 mL/min/1.73 m^2 Final     Comment:     Calculation used to obtain the estimated  glomerular filtration  rate (eGFR) is the CKD-EPI equation.        CrCl cannot be calculated (Patient's most recent lab result is older than the maximum 7 days allowed.).    Assessment:     1. Coronary artery disease involving native coronary artery of native heart without angina pectoris    2. Hyperlipidemia associated with type 2 diabetes mellitus    3. Hypertension associated with diabetes    4. CKD stage 3 due to type 2 diabetes mellitus    5. Type 2 diabetes mellitus with complication, unspecified whether long term insulin use    6. SOFIYA on CPAP    7. CAD (coronary artery disease)      BP up today. Typically runs 120's systolic at home   Has no CNS complaints to suggest TIA or CVA  No abnormal bleeding on DAPT   Using SL nitro PRN  Uses CPAP most nights   No angina or equivalent    Plan:   Patient will log his BP readings for 1 week and phone review in 1 week    Continue to use nitro PRN  Heart healthy diet  Limit fluid intake 50-60 oz   Daily weights and to notify clinic if weight increases by more than 3 lbs in 1 day or 5 lbs in 1 week.   Exercise routine as tolerated  RTC in 6  months

## 2019-02-18 ENCOUNTER — TELEPHONE (OUTPATIENT)
Dept: INTERNAL MEDICINE | Facility: CLINIC | Age: 59
End: 2019-02-18

## 2019-02-18 DIAGNOSIS — Z12.11 COLON CANCER SCREENING: Primary | ICD-10-CM

## 2019-02-18 NOTE — TELEPHONE ENCOUNTER
Patient notified that order for colonoscopy has been put in. He was given the number to call and follow up on appointment if he does not receive a call.

## 2019-02-18 NOTE — TELEPHONE ENCOUNTER
----- Message from Jenny Grite sent at 2/18/2019 12:18 PM CST -----  Contact: Pt   Pt called and stated he needs orders for a coloscopy. He can be reached at [phone].    Thanks,  TF

## 2019-02-20 ENCOUNTER — TELEPHONE (OUTPATIENT)
Dept: CARDIOLOGY | Facility: CLINIC | Age: 59
End: 2019-02-20

## 2019-02-20 ENCOUNTER — TELEPHONE (OUTPATIENT)
Dept: ENDOSCOPY | Facility: HOSPITAL | Age: 59
End: 2019-02-20

## 2019-02-20 RX ORDER — SODIUM, POTASSIUM,MAG SULFATES 17.5-3.13G
1 SOLUTION, RECONSTITUTED, ORAL ORAL DAILY
Qty: 1 KIT | Refills: 0 | Status: SHIPPED | OUTPATIENT
Start: 2019-02-20 | End: 2019-02-22

## 2019-02-20 RX ORDER — ISOSORBIDE MONONITRATE 30 MG/1
30 TABLET, EXTENDED RELEASE ORAL 2 TIMES DAILY
Qty: 60 TABLET | Refills: 6 | Status: SHIPPED | OUTPATIENT
Start: 2019-02-20 | End: 2019-03-06 | Stop reason: SDUPTHER

## 2019-02-20 NOTE — TELEPHONE ENCOUNTER
Spoke with patient to follow up for edema and BP readings ,patient states his BP readings in left arm 148/85 and 135/85 in the right arm. States his angina is worse in the evening and taking nitro PRN ,states the swelling has gotten a little better advise his to limit fluid intake 50-60 oz, and take all mediation as prescribed.Per Angle he is to take Imdur 60 MG  bid and a phone review will be scheduled.

## 2019-02-20 NOTE — TELEPHONE ENCOUNTER
----- Message from АНДРЕЙ Jose sent at 2/12/2019  5:03 PM CST -----  Phone review in 1 week to review BP log

## 2019-02-20 NOTE — TELEPHONE ENCOUNTER
Endoscopy Scheduling Questionnaire:    Call Type:Outgoing call    1. Have you been admitted overnight to the hospital in the past 3 months? no  2. Do you get CP and SOB while walking up a flight of stairs? no  3. Have you had a stent placed in the past 12 months? no  4. Have you had a stroke or heart attack in the past 6 months? no  5. Have you had any chest pain in the past 3 months? no      If so, have you been evaluated by your PCP or Cardiologist? no  6. Do you take weight loss medications? no  7. Have you been diagnosed with Diverticulitis within the past 3 months? no  8. Are you having any GI symptoms that you feel need to be evaluated prior to your procedure? no  9. Are you on dialysis? no  10. Are you diabetic? yes  11. Do you have any other health issues that you feel might limit your ability to safely have the procedure and/or sedation? no  12. Is the patient over 81 yo? no        If so, has the patient been seen by their PCP or GI in the last 3 months? N/A       -I have reviewed the last colonoscopy for recommendations regarding surveillance and bowel prep  Yes  -I have reviewed the patient's medications and allergies. He is on high risk medications and will require cardiac clearance. A clearance request will be sent to Dr. Mas within 30 days of procedure  -I have verified the pharmacy information. The prep being used is Suprep. The patient's prep instructions were sent via mail..    Date Endoscopy Scheduled: Date: 4/9/19  Or  Date Gastro office visit Scheduled: NA

## 2019-02-26 ENCOUNTER — TELEPHONE (OUTPATIENT)
Dept: ENDOSCOPY | Facility: HOSPITAL | Age: 59
End: 2019-02-26

## 2019-02-28 RX ORDER — NITROGLYCERIN 0.4 MG/1
TABLET SUBLINGUAL
Qty: 25 TABLET | Refills: 11 | Status: SHIPPED | OUTPATIENT
Start: 2019-02-28 | End: 2019-03-08 | Stop reason: SDUPTHER

## 2019-03-06 RX ORDER — ISOSORBIDE MONONITRATE 30 MG/1
30 TABLET, EXTENDED RELEASE ORAL 2 TIMES DAILY
Qty: 180 TABLET | Refills: 3 | Status: SHIPPED | OUTPATIENT
Start: 2019-03-06 | End: 2019-03-08 | Stop reason: SDUPTHER

## 2019-03-06 NOTE — TELEPHONE ENCOUNTER
----- Message from Tisha Mcknight sent at 3/6/2019  3:02 PM CST -----  Contact: self  Type:  RX Refill Request    Who Called: patient  Refill or New Rx: refill  RX Name and Strength: isosorbide mononitrate (IMDUR) 30 MG 24 hr tablet  How is the patient currently taking it? (ex. 1XDay): 1or2x/day  Is this a 30 day or 90 day RX: 90  R:   [facility] [place] - [place], [place] - [address]  [address] [address]  Phone: [phone] Fax: [phone]      Local or Mail Order: local  Ordering Provider:dr. bush  Would the patient rather a call back or a response via [facility]? call  Best Call Back Number:[phone]  Additional Information: n/a

## 2019-03-08 LAB
A1C: 6.9
CHOLEST SERPL-MSCNC: 169 MG/DL (ref 0–200)
HDLC SERPL-MCNC: 45 MG/DL
LDL/HDL RATIO: 2.4
LDLC SERPL CALC-MCNC: 110 MG/DL
TRIGLYCERIDE, BF: 72

## 2019-03-08 RX ORDER — ISOSORBIDE MONONITRATE 30 MG/1
30 TABLET, EXTENDED RELEASE ORAL 2 TIMES DAILY
Qty: 180 TABLET | Refills: 3 | Status: SHIPPED | OUTPATIENT
Start: 2019-03-08 | End: 2019-04-03 | Stop reason: SDUPTHER

## 2019-03-08 RX ORDER — NITROGLYCERIN 0.4 MG/1
0.4 TABLET SUBLINGUAL EVERY 5 MIN PRN
Qty: 25 TABLET | Refills: 11 | Status: SHIPPED | OUTPATIENT
Start: 2019-03-08 | End: 2019-09-10 | Stop reason: SDUPTHER

## 2019-03-11 ENCOUNTER — LAB VISIT (OUTPATIENT)
Dept: LAB | Facility: HOSPITAL | Age: 59
End: 2019-03-11
Attending: INTERNAL MEDICINE
Payer: COMMERCIAL

## 2019-03-11 DIAGNOSIS — N18.30 CHRONIC KIDNEY DISEASE, STAGE III (MODERATE): ICD-10-CM

## 2019-03-11 LAB
ANION GAP SERPL CALC-SCNC: 10 MMOL/L
BASOPHILS # BLD AUTO: 0.02 K/UL
BASOPHILS NFR BLD: 0.3 %
BUN SERPL-MCNC: 22 MG/DL
CALCIUM SERPL-MCNC: 10.1 MG/DL
CHLORIDE SERPL-SCNC: 108 MMOL/L
CO2 SERPL-SCNC: 24 MMOL/L
CREAT SERPL-MCNC: 1.9 MG/DL
DIFFERENTIAL METHOD: ABNORMAL
EOSINOPHIL # BLD AUTO: 0.2 K/UL
EOSINOPHIL NFR BLD: 3.9 %
ERYTHROCYTE [DISTWIDTH] IN BLOOD BY AUTOMATED COUNT: 14.7 %
EST. GFR  (AFRICAN AMERICAN): 43.3 ML/MIN/1.73 M^2
EST. GFR  (NON AFRICAN AMERICAN): 37.5 ML/MIN/1.73 M^2
GLUCOSE SERPL-MCNC: 93 MG/DL
HCT VFR BLD AUTO: 39 %
HGB BLD-MCNC: 13.2 G/DL
IMM GRANULOCYTES # BLD AUTO: 0.01 K/UL
IMM GRANULOCYTES NFR BLD AUTO: 0.2 %
LYMPHOCYTES # BLD AUTO: 1.9 K/UL
LYMPHOCYTES NFR BLD: 31.7 %
MCH RBC QN AUTO: 28.6 PG
MCHC RBC AUTO-ENTMCNC: 33.8 G/DL
MCV RBC AUTO: 84 FL
MONOCYTES # BLD AUTO: 0.6 K/UL
MONOCYTES NFR BLD: 9.8 %
NEUTROPHILS # BLD AUTO: 3.2 K/UL
NEUTROPHILS NFR BLD: 54.1 %
NRBC BLD-RTO: 0 /100 WBC
PLATELET # BLD AUTO: 200 K/UL
PMV BLD AUTO: 10 FL
POTASSIUM SERPL-SCNC: 3.6 MMOL/L
RBC # BLD AUTO: 4.62 M/UL
SODIUM SERPL-SCNC: 142 MMOL/L
WBC # BLD AUTO: 5.83 K/UL

## 2019-03-11 PROCEDURE — 83970 ASSAY OF PARATHORMONE: CPT

## 2019-03-11 PROCEDURE — 36415 COLL VENOUS BLD VENIPUNCTURE: CPT | Mod: PO

## 2019-03-11 PROCEDURE — 85025 COMPLETE CBC W/AUTO DIFF WBC: CPT

## 2019-03-11 PROCEDURE — 80048 BASIC METABOLIC PNL TOTAL CA: CPT

## 2019-03-12 ENCOUNTER — OFFICE VISIT (OUTPATIENT)
Dept: NEPHROLOGY | Facility: CLINIC | Age: 59
End: 2019-03-12
Payer: COMMERCIAL

## 2019-03-12 VITALS
WEIGHT: 240.31 LBS | DIASTOLIC BLOOD PRESSURE: 84 MMHG | SYSTOLIC BLOOD PRESSURE: 130 MMHG | BODY MASS INDEX: 32.55 KG/M2 | HEART RATE: 75 BPM | HEIGHT: 72 IN

## 2019-03-12 DIAGNOSIS — R60.9 EDEMA, UNSPECIFIED TYPE: ICD-10-CM

## 2019-03-12 DIAGNOSIS — I50.23 ACUTE ON CHRONIC SYSTOLIC CONGESTIVE HEART FAILURE: ICD-10-CM

## 2019-03-12 DIAGNOSIS — Z71.89 ENCOUNTER FOR MEDICATION REVIEW AND COUNSELING: ICD-10-CM

## 2019-03-12 DIAGNOSIS — N18.30 CHRONIC KIDNEY DISEASE, STAGE III (MODERATE): Primary | ICD-10-CM

## 2019-03-12 DIAGNOSIS — I50.33 ACUTE ON CHRONIC DIASTOLIC CONGESTIVE HEART FAILURE: ICD-10-CM

## 2019-03-12 LAB — PTH-INTACT SERPL-MCNC: 129 PG/ML

## 2019-03-12 PROCEDURE — 99999 PR PBB SHADOW E&M-EST. PATIENT-LVL III: CPT | Mod: PBBFAC,,, | Performed by: INTERNAL MEDICINE

## 2019-03-12 PROCEDURE — 3075F SYST BP GE 130 - 139MM HG: CPT | Mod: CPTII,S$GLB,, | Performed by: INTERNAL MEDICINE

## 2019-03-12 PROCEDURE — 99215 OFFICE O/P EST HI 40 MIN: CPT | Mod: S$GLB,,, | Performed by: INTERNAL MEDICINE

## 2019-03-12 PROCEDURE — 3008F BODY MASS INDEX DOCD: CPT | Mod: CPTII,S$GLB,, | Performed by: INTERNAL MEDICINE

## 2019-03-12 PROCEDURE — 3075F PR MOST RECENT SYSTOLIC BLOOD PRESS GE 130-139MM HG: ICD-10-PCS | Mod: CPTII,S$GLB,, | Performed by: INTERNAL MEDICINE

## 2019-03-12 PROCEDURE — 3008F PR BODY MASS INDEX (BMI) DOCUMENTED: ICD-10-PCS | Mod: CPTII,S$GLB,, | Performed by: INTERNAL MEDICINE

## 2019-03-12 PROCEDURE — 3079F PR MOST RECENT DIASTOLIC BLOOD PRESSURE 80-89 MM HG: ICD-10-PCS | Mod: CPTII,S$GLB,, | Performed by: INTERNAL MEDICINE

## 2019-03-12 PROCEDURE — 3079F DIAST BP 80-89 MM HG: CPT | Mod: CPTII,S$GLB,, | Performed by: INTERNAL MEDICINE

## 2019-03-12 PROCEDURE — 99215 PR OFFICE/OUTPT VISIT, EST, LEVL V, 40-54 MIN: ICD-10-PCS | Mod: S$GLB,,, | Performed by: INTERNAL MEDICINE

## 2019-03-12 PROCEDURE — 99999 PR PBB SHADOW E&M-EST. PATIENT-LVL III: ICD-10-PCS | Mod: PBBFAC,,, | Performed by: INTERNAL MEDICINE

## 2019-03-12 RX ORDER — OLMESARTAN MEDOXOMIL 5 MG/1
5 TABLET ORAL DAILY
Qty: 90 TABLET | Refills: 3 | Status: SHIPPED | OUTPATIENT
Start: 2019-03-12 | End: 2020-03-11

## 2019-03-12 RX ORDER — ROSUVASTATIN CALCIUM 40 MG/1
40 TABLET, COATED ORAL DAILY
Refills: 1 | COMMUNITY
Start: 2018-12-05

## 2019-03-12 NOTE — PROGRESS NOTES
"NEPHROLOGY CLINIC FOLLOWUP NOTE:  Date of clinic visit: 3/12/19     REASON FOR FOLLOWUP AND CHIEF COMPLAINT:  History of chronic kidney disease.       ORTHOPEDIC SURGEON:  Mendoza Wray MD     HISTORY OF PRESENT ILLNESS:  Mr. Cox is a 59-year-old  male with the history of CKD stage 3, diabetes mellitus, hypertension and combined diastolic and systolic CHF (EF 30%), who presents for follow-up. He was last seen by us in May 2018. Chart was reviewed. Noted recent concerns raised by cardiology regarding worsened CHF. Pt has c/o's of large leg swelling. He does not feel SOB at rest or walking. He says he is feeling "OK" today. No acute issues today, no chest pain.    Life style was discussed with pt. He remains physically active and avoids unhealthy foods. But pt admits to eating some salty foods, and his fluid intake is very large. He drinks sodas at night, He has stayed on lasix 20 mg qd. Labs and meds reviewed with pt. He was on valsartan on his last visit with us. It appears that it was stopped in sept when K was 5.6, he has not had high K o/w.    PAST MEDICAL HISTORY:  1.  CKD stage III, estimated GFR about 40 mL/min, baseline creatinine is close to 2 mg/dL.  2.  Hypertension.  3.  Diabetes mellitus for at least 10 years.  4.  History of coronary artery disease with past history of MI at the age of 19/premature heart disease and history of CABG in 2001.  5.  Diastolic and systolic (EF 30%) congestive heart failure.  6.  Obstructive sleep apnea, on CPAP.  7.  Gout.  8.  Restless leg syndrome.  9.  Hyperlipidemia.  10.  Bursitis of the right elbow.     PAST SURGICAL HISTORY:  Reviewed as above unchanged.     FAMILY HISTORY:  Reviewed and unchanged.  Mother passed away at the age of 52   from pancreatic cancer.  Multiple family members with diabetes mellitus.  Father   is alive and well in his 70s.     ALLERGIES:  Reviewed.  No known drug allergies.     SOCIAL HISTORY:  Negative for smoking.  No " alcohol use.     MEDICATIONS:  Reviewed. Lasix 20 mg qd, amlodipine 5 mg qd, imdur 30 mg bid, metoprolol 100 mg bid, off valsartan. Other meds noted     REVIEW OF SYSTEMS:  No recent hospitalizations.  GENERAL:  Negative.  HEAD, EYES, EARS, NOSE AND THROAT:  Negative.  CARDIAC:  Negative.  PULMONARY:  Negative.  GASTROINTESTINAL:  Negative.  GENITOURINARY:  Negative.  PSYCHOLOGICAL:  Negative.  NEUROLOGICAL:  Negative.  ENDOCRINE:  As above, otherwise negative.  HEMATOLOGIC AND ONCOLOGIC:  Negative.  INFECTIOUS DISEASE:  Negative.  The rest of the review of systems negative.     PHYSICAL EXAMINATION:  VITAL SIGNS:  Blood pressure is 130/84, pulse is 75, weight is 240 lbs, no change  In NAD, ambulating by himself  GENERAL:  He is cooperative, pleasant, atraumatic, normocephalic, well developed, well nourished.    Speech and thought process appropriate, normal.  HEENT:  Mucous membranes moist.  NECK:  No JVD.  HEART:  Regular rate and rhythm, S1 and S2 audible.  No rubs.  CHEST:  Clear to auscultation.  No rales.  No wheezes.  Breathing symmetric and   unlabored.  EXTREMITIES:  3+ edema.     LABORATORY:  Reviewed.   BMP  Lab Results   Component Value Date     03/11/2019    K 3.6 03/11/2019     03/11/2019    CO2 24 03/11/2019    BUN 22 (H) 03/11/2019    CREATININE 1.9 (H) 03/11/2019    CALCIUM 10.1 03/11/2019    ANIONGAP 10 03/11/2019    ESTGFRAFRICA 43.3 (A) 03/11/2019    EGFRNONAA 37.5 (A) 03/11/2019     Lab Results   Component Value Date    WBC 5.83 03/11/2019    HGB 13.2 (L) 03/11/2019    HCT 39.0 (L) 03/11/2019    MCV 84 03/11/2019     03/11/2019     Lab Results   Component Value Date    .0 (H) 03/11/2019    CALCIUM 10.1 03/11/2019    PHOS 3.0 09/28/2018             Urine protein//Cr 170 mg  PSA was 0.6.     ASSESSMENT AND PLAN:  This is a 59-year-old man with CKD stage III, who presents for followup.  The impression is as follows:     1.  Renal.  Renal function is stable.  Creatinine has  not changed or worsened since last visit  CKD stage 3  Likely cause HTN/hypertensive nephrosclerosis, vascular disease, and  and diabetic nephropathy  Discussed with pt  K normal  Mild metabolic acidosis, will monitor    2. iPTH slightly elevated, has mild secondary hyperparathyroidism   Will monitor.  Vit D not indicated at this point.    3. Microalbuminuria. Likely due to diabetic nephropathy  Was on valsartan in the past, stopped due to mild hyperkalemia  K currently normal  Will benefit from re-starting ACE-I or ARB for diabetic nephropathy, for renal and cardiac protection    4. HTN: BP well controlled  Meds reviewed      5.  Electrolytes were reviewed.    Potassium is normal  Acid base that is stable.    Mild hypercalcemia is the past (was due to HCTZ), s Ca currently normal     5.  Cardiac. H/o of diastolic CHF  Hemodynamically stable, but has very large peripheral edema  Fully discussed with pt  Life style, XS salt and fluid intake and worsened heart failure are the cause  Limit salt intake to 2-3 g/day  Limit fluid intake to 1-2 L/day  Increase lasix from 20 to 40 mg po qd x 3 days, then resume 20 mg po qd     6.  H/o of diabetes mellitus   Obesity  Metabolic syndrome  Hemoglobin A1c reviewed     7. Diet and lifestyle modifications: was  discussed with patient.  Advised pt to avoid high sugar (even artificial sweeteners) foods and drinks.      PLANS AND RECOMMENDATIONS:    As discussed above.  Limit salt intake to 2-3 g/day  Limit fluid intake to 1-2 L/day  Increase lasix from 20 to 40 mg po qd x 3 days, then resume 20 mg po  Re-start ARB, benicar 5 mg po qd  Return to see us in about 2 months    Total time spent 40 minutes, more than 50% of the time was spent on counseling   coordination of care.  Level 5 visit

## 2019-03-19 ENCOUNTER — TELEPHONE (OUTPATIENT)
Dept: ENDOSCOPY | Facility: HOSPITAL | Age: 59
End: 2019-03-19

## 2019-03-22 ENCOUNTER — DOCUMENTATION ONLY (OUTPATIENT)
Dept: ADMINISTRATIVE | Facility: HOSPITAL | Age: 59
End: 2019-03-22

## 2019-04-02 ENCOUNTER — TELEPHONE (OUTPATIENT)
Dept: ENDOSCOPY | Facility: HOSPITAL | Age: 59
End: 2019-04-02

## 2019-04-02 ENCOUNTER — TELEPHONE (OUTPATIENT)
Dept: CARDIOLOGY | Facility: HOSPITAL | Age: 59
End: 2019-04-02

## 2019-04-02 NOTE — TELEPHONE ENCOUNTER
Patient needs appt this week for pre-op clearance for EGD.    Looks like upon last phone review his Imdur was increased due to anginal symptoms. He needs evaluation in clinic.    Thanks

## 2019-04-02 NOTE — TELEPHONE ENCOUNTER
Please advise pt may hold Brilinta 3 days prior to endoscopy procedure. Procedure is scheduled on April 9, 2019.

## 2019-04-03 ENCOUNTER — OFFICE VISIT (OUTPATIENT)
Dept: CARDIOLOGY | Facility: CLINIC | Age: 59
End: 2019-04-03
Payer: COMMERCIAL

## 2019-04-03 VITALS
HEART RATE: 75 BPM | SYSTOLIC BLOOD PRESSURE: 118 MMHG | HEIGHT: 72 IN | DIASTOLIC BLOOD PRESSURE: 70 MMHG | WEIGHT: 242.31 LBS | BODY MASS INDEX: 32.82 KG/M2

## 2019-04-03 DIAGNOSIS — Z79.4 TYPE 2 DIABETES MELLITUS WITH DIABETIC NEPHROPATHY, WITH LONG-TERM CURRENT USE OF INSULIN: ICD-10-CM

## 2019-04-03 DIAGNOSIS — E11.21 TYPE 2 DIABETES MELLITUS WITH DIABETIC NEPHROPATHY, WITH LONG-TERM CURRENT USE OF INSULIN: ICD-10-CM

## 2019-04-03 DIAGNOSIS — Z01.810 PREOP CARDIOVASCULAR EXAM: Primary | ICD-10-CM

## 2019-04-03 DIAGNOSIS — G47.33 OSA ON CPAP: ICD-10-CM

## 2019-04-03 DIAGNOSIS — E11.59 HYPERTENSION ASSOCIATED WITH DIABETES: ICD-10-CM

## 2019-04-03 DIAGNOSIS — E78.5 HYPERLIPIDEMIA ASSOCIATED WITH TYPE 2 DIABETES MELLITUS: ICD-10-CM

## 2019-04-03 DIAGNOSIS — E11.69 HYPERLIPIDEMIA ASSOCIATED WITH TYPE 2 DIABETES MELLITUS: ICD-10-CM

## 2019-04-03 DIAGNOSIS — I15.2 HYPERTENSION ASSOCIATED WITH DIABETES: ICD-10-CM

## 2019-04-03 DIAGNOSIS — I25.10 CORONARY ARTERY DISEASE INVOLVING NATIVE CORONARY ARTERY OF NATIVE HEART WITHOUT ANGINA PECTORIS: ICD-10-CM

## 2019-04-03 DIAGNOSIS — N18.30 CKD STAGE 3 DUE TO TYPE 2 DIABETES MELLITUS: ICD-10-CM

## 2019-04-03 DIAGNOSIS — I25.708 ATHEROSCLEROSIS OF CABG W OTH ANGINA PECTORIS: ICD-10-CM

## 2019-04-03 DIAGNOSIS — E11.22 CKD STAGE 3 DUE TO TYPE 2 DIABETES MELLITUS: ICD-10-CM

## 2019-04-03 PROCEDURE — 3074F PR MOST RECENT SYSTOLIC BLOOD PRESSURE < 130 MM HG: ICD-10-PCS | Mod: CPTII,S$GLB,, | Performed by: INTERNAL MEDICINE

## 2019-04-03 PROCEDURE — 3044F HG A1C LEVEL LT 7.0%: CPT | Mod: CPTII,S$GLB,, | Performed by: INTERNAL MEDICINE

## 2019-04-03 PROCEDURE — 3008F PR BODY MASS INDEX (BMI) DOCUMENTED: ICD-10-PCS | Mod: CPTII,S$GLB,, | Performed by: INTERNAL MEDICINE

## 2019-04-03 PROCEDURE — 3078F DIAST BP <80 MM HG: CPT | Mod: CPTII,S$GLB,, | Performed by: INTERNAL MEDICINE

## 2019-04-03 PROCEDURE — 3008F BODY MASS INDEX DOCD: CPT | Mod: CPTII,S$GLB,, | Performed by: INTERNAL MEDICINE

## 2019-04-03 PROCEDURE — 99215 OFFICE O/P EST HI 40 MIN: CPT | Mod: S$GLB,,, | Performed by: INTERNAL MEDICINE

## 2019-04-03 PROCEDURE — 99999 PR PBB SHADOW E&M-EST. PATIENT-LVL III: ICD-10-PCS | Mod: PBBFAC,,, | Performed by: INTERNAL MEDICINE

## 2019-04-03 PROCEDURE — 3078F PR MOST RECENT DIASTOLIC BLOOD PRESSURE < 80 MM HG: ICD-10-PCS | Mod: CPTII,S$GLB,, | Performed by: INTERNAL MEDICINE

## 2019-04-03 PROCEDURE — 99215 PR OFFICE/OUTPT VISIT, EST, LEVL V, 40-54 MIN: ICD-10-PCS | Mod: S$GLB,,, | Performed by: INTERNAL MEDICINE

## 2019-04-03 PROCEDURE — 99999 PR PBB SHADOW E&M-EST. PATIENT-LVL III: CPT | Mod: PBBFAC,,, | Performed by: INTERNAL MEDICINE

## 2019-04-03 PROCEDURE — 3074F SYST BP LT 130 MM HG: CPT | Mod: CPTII,S$GLB,, | Performed by: INTERNAL MEDICINE

## 2019-04-03 PROCEDURE — 3044F PR MOST RECENT HEMOGLOBIN A1C LEVEL <7.0%: ICD-10-PCS | Mod: CPTII,S$GLB,, | Performed by: INTERNAL MEDICINE

## 2019-04-03 RX ORDER — FUROSEMIDE 40 MG/1
40 TABLET ORAL DAILY
Qty: 30 TABLET | Refills: 11 | Status: SHIPPED | OUTPATIENT
Start: 2019-04-03

## 2019-04-03 RX ORDER — ISOSORBIDE MONONITRATE 60 MG/1
60 TABLET, EXTENDED RELEASE ORAL 2 TIMES DAILY
Qty: 60 TABLET | Refills: 5 | Status: SHIPPED | OUTPATIENT
Start: 2019-04-03 | End: 2019-08-06

## 2019-04-03 NOTE — H&P (VIEW-ONLY)
Subjective:   Patient ID:  Christiano Cox is a 59 y.o. male who presents for follow up of Pre-op Exam and Coronary Artery Disease      60 yo male, preop of cervical spinal surgery at [facility] by dr. Mendoza Wray [phone]  He's f/u at cardiology service  PMH CAD s/p CABg x3 in 2001 and PCI in 2016, NIDDM, HTN, HLD  5/16/16 LHC due to NSTEMI showed occluded rca, lad and lcx, occluded svg to pda, patent lima to lad and Normal filling pressure. Successful PCI with GRAHAM to SVG to OM1  Echo in  EF 30 to 35%, mild to moderate MR/TR, decreased EF from 45% two yrs ago.  Works as .  Yard work caused SOB and fatigue. Decreased exercise capacity compared to two yrs ago  Angina pain 3 to 4 times a week by exertional and emotional stress. Resting or taking NTG SL x1 controlled the pain  Sleeps with 3 pillows for the whole life.  EKG in  NSR, PVC and LVH/LBBB      Past Medical History:   Diagnosis Date    Acute coronary syndrome     CKD stage 3 due to type 2 diabetes mellitus     Coronary artery disease involving native coronary artery without angina pectoris 9/13/2016    Coronary atherosclerosis of unspecified type of vessel, native or graft     s/p cabg and stents    DM (diabetes mellitus), type 2 with complications     History of gout     Hyperlipidemia associated with type 2 diabetes mellitus     Hypertension associated with diabetes     NSTEMI (non-ST elevated myocardial infarction) 5/16/2016    Obesity, unspecified     SOFIYA on CPAP     S/P CABG x 3     Type II diabetes mellitus with renal manifestations        Past Surgical History:   Procedure Laterality Date    BACK SURGERY      CARDIAC CATHETERIZATION      COLONOSCOPY N/A 7/3/2014    Performed [facility]ruff [facility] ENDO    CORONARY ANGIOPLASTY      CORONARY ARTERY BYPASS GRAFT         Social History     Tobacco Use    Smoking status: Never Smoker    Smokeless tobacco: Never Used   Substance  Use Topics    Alcohol use: No     Frequency: Never    Drug use: No       Family History   Problem Relation Age of Onset    Diabetes Mother     Hypertension Father     Stroke Sister     Diabetes Sister     Heart disease Brother     Diabetes Brother     Hypertension Brother          Review of Systems   Constitution: Negative for decreased appetite, diaphoresis, fever, malaise/fatigue and night sweats.   HENT: Negative for nosebleeds.    Eyes: Negative for blurred vision and double vision.   Cardiovascular: Positive for chest pain and dyspnea on exertion. Negative for claudication, irregular heartbeat, leg swelling, near-syncope, orthopnea, palpitations, paroxysmal nocturnal dyspnea and syncope.   Respiratory: Negative for cough, shortness of breath, sleep disturbances due to breathing, snoring, sputum production and wheezing.    Endocrine: Negative for cold intolerance and polyuria.   Hematologic/Lymphatic: Does not bruise/bleed easily.   Skin: Negative for rash.   Musculoskeletal: Negative for back pain, falls, joint pain, joint swelling and neck pain.   Gastrointestinal: Negative for abdominal pain, heartburn, nausea and vomiting.   Genitourinary: Negative for dysuria, frequency and hematuria.   Neurological: Negative for difficulty with concentration, dizziness, focal weakness, headaches, light-headedness, numbness, seizures and weakness.   Psychiatric/Behavioral: Negative for depression, memory loss and substance abuse. The patient does not have insomnia.    Allergic/Immunologic: Negative for HIV exposure and hives.       Objective:   Physical Exam   Constitutional: He is oriented to person, place, and time. He appears well-nourished.   HENT:   Head: Normocephalic.   Eyes: Pupils are equal, round, and reactive to light.   Neck: Normal carotid pulses and no JVD present. Carotid bruit is not present. No thyromegaly present.   Cardiovascular: Normal rate, regular rhythm, normal heart sounds and normal  pulses.  No extrasystoles are present. PMI is not displaced. Exam reveals no gallop and no S3.   No murmur heard.  Pulmonary/Chest: Breath sounds normal. No stridor. No respiratory distress.   Abdominal: Soft. Bowel sounds are normal. There is no tenderness. There is no rebound.   Musculoskeletal: Normal range of motion. He exhibits edema.   1+ edema   Neurological: He is alert and oriented to person, place, and time.   Skin: Skin is intact. No rash noted.   Psychiatric: His behavior is normal.       Lab Results   Component Value Date    CHOL 169 03/08/2019    CHOL 138 09/28/2018    CHOL 155 10/10/2017     Lab Results   Component Value Date    HDL 45 03/08/2019    HDL 26 (L) 09/28/2018    HDL 36 (L) 10/10/2017     Lab Results   Component Value Date    LDLCALC 110 03/08/2019    LDLCALC 90.2 09/28/2018    LDLCALC 100.4 10/10/2017     Lab Results   Component Value Date    TRIG 109 09/28/2018    TRIG 93 10/10/2017    TRIG 108 06/05/2017     Lab Results   Component Value Date    CHOLHDL 18.8 (L) 09/28/2018    CHOLHDL 23.2 10/10/2017    CHOLHDL 21.6 02/14/2017       Chemistry        Component Value Date/Time     03/11/2019 1428    K 3.6 03/11/2019 1428     03/11/2019 1428    CO2 24 03/11/2019 1428    BUN 22 (H) 03/11/2019 1428    CREATININE 1.9 (H) 03/11/2019 1428    GLU 93 03/11/2019 1428        Component Value Date/Time    CALCIUM 10.1 03/11/2019 1428    ALKPHOS 37 (L) 09/30/2018 0531    AST 34 09/30/2018 0531    ALT 45 (H) 09/30/2018 0531    BILITOT 0.4 09/30/2018 0531    ESTGFRAFRICA 43.3 (A) 03/11/2019 1428    EGFRNONAA 37.5 (A) 03/11/2019 1428          Lab Results   Component Value Date    LABA1C 6.9 03/08/2019    HGBA1C 7.2 (H) 09/28/2018     Lab Results   Component Value Date    TSH 2.895 09/28/2018     Lab Results   Component Value Date    INR 1.1 09/28/2018    INR 1.0 05/15/2016     Lab Results   Component Value Date    WBC 5.83 03/11/2019    HGB 13.2 (L) 03/11/2019    HCT 39.0 (L) 03/11/2019    MCV  84 03/11/2019     03/11/2019     BMP  Sodium   Date Value Ref Range Status   03/11/2019 142 136 - 145 mmol/L Final     Potassium   Date Value Ref Range Status   03/11/2019 3.6 3.5 - 5.1 mmol/L Final     Chloride   Date Value Ref Range Status   03/11/2019 108 95 - 110 mmol/L Final     CO2   Date Value Ref Range Status   03/11/2019 24 23 - 29 mmol/L Final     BUN, Bld   Date Value Ref Range Status   03/11/2019 22 (H) 6 - 20 mg/dL Final     Creatinine   Date Value Ref Range Status   03/11/2019 1.9 (H) 0.5 - 1.4 mg/dL Final     Calcium   Date Value Ref Range Status   03/11/2019 10.1 8.7 - 10.5 mg/dL Final     Anion Gap   Date Value Ref Range Status   03/11/2019 10 8 - 16 mmol/L Final     eGFR if    Date Value Ref Range Status   03/11/2019 43.3 (A) >60 mL/min/1.73 m^2 Final     eGFR if non    Date Value Ref Range Status   03/11/2019 37.5 (A) >60 mL/min/1.73 m^2 Final     Comment:     Calculation used to obtain the estimated glomerular filtration  rate (eGFR) is the CKD-EPI equation.        BNP  @LABRCNTIP(BNP,BNPTRIAGEBLO)@  @LABRCNTIP(troponini)@  CrCl cannot be calculated (Patient's most recent lab result is older than the maximum 7 days allowed.).  No results found in the last 24 hours.  No results found in the last 24 hours.  No results found in the last 24 hours.    Assessment:      1. Preop cardiovascular exam    2. Coronary artery disease involving native coronary artery of native heart without angina pectoris    3. Atherosclerosis of CABG w oth angina pectoris    4. CKD stage 3 due to type 2 diabetes mellitus    5. Hypertension associated with diabetes    6. Hyperlipidemia associated with type 2 diabetes mellitus    7. Type 2 diabetes mellitus with diabetic nephropathy, with long-term current use of insulin    8. SOFIYA on CPAP      Active angina 3 or 4 times a week.  Cr 1.9 stable    Plan:   Phar. NUke stress test   Increase Imdur to 60 mg bid  Continue ASA, Brilinta,  amlodipine, crestor, Ranexa, ARB, BB.  [facility] gato SELF   Hold surgery at this time.  RTC as indicated

## 2019-04-03 NOTE — PROGRESS NOTES
Subjective:   Patient ID:  Christiano Cox is a 59 y.o. male who presents for follow up of Pre-op Exam and Coronary Artery Disease      58 yo male, preop of cervical spinal surgery at [facility] by dr. Mendoza Wray [phone]  He's f/u at cardiology service  PMH CAD s/p CABg x3 in 2001 and PCI in 2016, NIDDM, HTN, HLD  5/16/16 LHC due to NSTEMI showed occluded rca, lad and lcx, occluded svg to pda, patent lima to lad and Normal filling pressure. Successful PCI with GRAHAM to SVG to OM1  Echo in  EF 30 to 35%, mild to moderate MR/TR, decreased EF from 45% two yrs ago.  Works as .  Yard work caused SOB and fatigue. Decreased exercise capacity compared to two yrs ago  Angina pain 3 to 4 times a week by exertional and emotional stress. Resting or taking NTG SL x1 controlled the pain  Sleeps with 3 pillows for the whole life.  EKG in  NSR, PVC and LVH/LBBB      Past Medical History:   Diagnosis Date    Acute coronary syndrome     CKD stage 3 due to type 2 diabetes mellitus     Coronary artery disease involving native coronary artery without angina pectoris 9/13/2016    Coronary atherosclerosis of unspecified type of vessel, native or graft     s/p cabg and stents    DM (diabetes mellitus), type 2 with complications     History of gout     Hyperlipidemia associated with type 2 diabetes mellitus     Hypertension associated with diabetes     NSTEMI (non-ST elevated myocardial infarction) 5/16/2016    Obesity, unspecified     SOFIYA on CPAP     S/P CABG x 3     Type II diabetes mellitus with renal manifestations        Past Surgical History:   Procedure Laterality Date    BACK SURGERY      CARDIAC CATHETERIZATION      COLONOSCOPY N/A 7/3/2014    [facility]formed [facility]ip[facility]ruff [facility] ENDO    CORONARY ANGIOPLASTY      CORONARY ARTERY BYPASS GRAFT         Social History     Tobacco Use    Smoking status: Never Smoker    Smokeless tobacco: Never Used   Substance  Use Topics    Alcohol use: No     Frequency: Never    Drug use: No       Family History   Problem Relation Age of Onset    Diabetes Mother     Hypertension Father     Stroke Sister     Diabetes Sister     Heart disease Brother     Diabetes Brother     Hypertension Brother          Review of Systems   Constitution: Negative for decreased appetite, diaphoresis, fever, malaise/fatigue and night sweats.   HENT: Negative for nosebleeds.    Eyes: Negative for blurred vision and double vision.   Cardiovascular: Positive for chest pain and dyspnea on exertion. Negative for claudication, irregular heartbeat, leg swelling, near-syncope, orthopnea, palpitations, paroxysmal nocturnal dyspnea and syncope.   Respiratory: Negative for cough, shortness of breath, sleep disturbances due to breathing, snoring, sputum production and wheezing.    Endocrine: Negative for cold intolerance and polyuria.   Hematologic/Lymphatic: Does not bruise/bleed easily.   Skin: Negative for rash.   Musculoskeletal: Negative for back pain, falls, joint pain, joint swelling and neck pain.   Gastrointestinal: Negative for abdominal pain, heartburn, nausea and vomiting.   Genitourinary: Negative for dysuria, frequency and hematuria.   Neurological: Negative for difficulty with concentration, dizziness, focal weakness, headaches, light-headedness, numbness, seizures and weakness.   Psychiatric/Behavioral: Negative for depression, memory loss and substance abuse. The patient does not have insomnia.    Allergic/Immunologic: Negative for HIV exposure and hives.       Objective:   Physical Exam   Constitutional: He is oriented to person, place, and time. He appears well-nourished.   HENT:   Head: Normocephalic.   Eyes: Pupils are equal, round, and reactive to light.   Neck: Normal carotid pulses and no JVD present. Carotid bruit is not present. No thyromegaly present.   Cardiovascular: Normal rate, regular rhythm, normal heart sounds and normal  pulses.  No extrasystoles are present. PMI is not displaced. Exam reveals no gallop and no S3.   No murmur heard.  Pulmonary/Chest: Breath sounds normal. No stridor. No respiratory distress.   Abdominal: Soft. Bowel sounds are normal. There is no tenderness. There is no rebound.   Musculoskeletal: Normal range of motion. He exhibits edema.   1+ edema   Neurological: He is alert and oriented to person, place, and time.   Skin: Skin is intact. No rash noted.   Psychiatric: His behavior is normal.       Lab Results   Component Value Date    CHOL 169 03/08/2019    CHOL 138 09/28/2018    CHOL 155 10/10/2017     Lab Results   Component Value Date    HDL 45 03/08/2019    HDL 26 (L) 09/28/2018    HDL 36 (L) 10/10/2017     Lab Results   Component Value Date    LDLCALC 110 03/08/2019    LDLCALC 90.2 09/28/2018    LDLCALC 100.4 10/10/2017     Lab Results   Component Value Date    TRIG 109 09/28/2018    TRIG 93 10/10/2017    TRIG 108 06/05/2017     Lab Results   Component Value Date    CHOLHDL 18.8 (L) 09/28/2018    CHOLHDL 23.2 10/10/2017    CHOLHDL 21.6 02/14/2017       Chemistry        Component Value Date/Time     03/11/2019 1428    K 3.6 03/11/2019 1428     03/11/2019 1428    CO2 24 03/11/2019 1428    BUN 22 (H) 03/11/2019 1428    CREATININE 1.9 (H) 03/11/2019 1428    GLU 93 03/11/2019 1428        Component Value Date/Time    CALCIUM 10.1 03/11/2019 1428    ALKPHOS 37 (L) 09/30/2018 0531    AST 34 09/30/2018 0531    ALT 45 (H) 09/30/2018 0531    BILITOT 0.4 09/30/2018 0531    ESTGFRAFRICA 43.3 (A) 03/11/2019 1428    EGFRNONAA 37.5 (A) 03/11/2019 1428          Lab Results   Component Value Date    LABA1C 6.9 03/08/2019    HGBA1C 7.2 (H) 09/28/2018     Lab Results   Component Value Date    TSH 2.895 09/28/2018     Lab Results   Component Value Date    INR 1.1 09/28/2018    INR 1.0 05/15/2016     Lab Results   Component Value Date    WBC 5.83 03/11/2019    HGB 13.2 (L) 03/11/2019    HCT 39.0 (L) 03/11/2019    MCV  84 03/11/2019     03/11/2019     BMP  Sodium   Date Value Ref Range Status   03/11/2019 142 136 - 145 mmol/L Final     Potassium   Date Value Ref Range Status   03/11/2019 3.6 3.5 - 5.1 mmol/L Final     Chloride   Date Value Ref Range Status   03/11/2019 108 95 - 110 mmol/L Final     CO2   Date Value Ref Range Status   03/11/2019 24 23 - 29 mmol/L Final     BUN, Bld   Date Value Ref Range Status   03/11/2019 22 (H) 6 - 20 mg/dL Final     Creatinine   Date Value Ref Range Status   03/11/2019 1.9 (H) 0.5 - 1.4 mg/dL Final     Calcium   Date Value Ref Range Status   03/11/2019 10.1 8.7 - 10.5 mg/dL Final     Anion Gap   Date Value Ref Range Status   03/11/2019 10 8 - 16 mmol/L Final     eGFR if    Date Value Ref Range Status   03/11/2019 43.3 (A) >60 mL/min/1.73 m^2 Final     eGFR if non    Date Value Ref Range Status   03/11/2019 37.5 (A) >60 mL/min/1.73 m^2 Final     Comment:     Calculation used to obtain the estimated glomerular filtration  rate (eGFR) is the CKD-EPI equation.        BNP  @LABRCNTIP(BNP,BNPTRIAGEBLO)@  @LABRCNTIP(troponini)@  CrCl cannot be calculated (Patient's most recent lab result is older than the maximum 7 days allowed.).  No results found in the last 24 hours.  No results found in the last 24 hours.  No results found in the last 24 hours.    Assessment:      1. Preop cardiovascular exam    2. Coronary artery disease involving native coronary artery of native heart without angina pectoris    3. Atherosclerosis of CABG w oth angina pectoris    4. CKD stage 3 due to type 2 diabetes mellitus    5. Hypertension associated with diabetes    6. Hyperlipidemia associated with type 2 diabetes mellitus    7. Type 2 diabetes mellitus with diabetic nephropathy, with long-term current use of insulin    8. SOFIYA on CPAP      Active angina 3 or 4 times a week.  Cr 1.9 stable    Plan:   Phar. NUke stress test   Increase Imdur to 60 mg bid  Continue ASA, Brilinta,  amlodipine, crestor, Ranexa, ARB, BB.  [facility] gato SELF   Hold surgery at this time.  RTC as indicated

## 2019-04-09 ENCOUNTER — HOSPITAL ENCOUNTER (OUTPATIENT)
Dept: RADIOLOGY | Facility: HOSPITAL | Age: 59
Discharge: HOME | End: 2019-04-09
Attending: INTERNAL MEDICINE | Admitting: INTERNAL MEDICINE
Payer: COMMERCIAL

## 2019-04-09 ENCOUNTER — CLINICAL SUPPORT (OUTPATIENT)
Dept: CARDIOLOGY | Facility: CLINIC | Age: 59
End: 2019-04-09
Attending: INTERNAL MEDICINE
Payer: COMMERCIAL

## 2019-04-09 DIAGNOSIS — Z01.810 PREOP CARDIOVASCULAR EXAM: ICD-10-CM

## 2019-04-09 DIAGNOSIS — I25.10 CORONARY ARTERY DISEASE INVOLVING NATIVE CORONARY ARTERY OF NATIVE HEART WITHOUT ANGINA PECTORIS: ICD-10-CM

## 2019-04-09 LAB — DIASTOLIC DYSFUNCTION: NO

## 2019-04-09 PROCEDURE — 93015 NM MULTI PHARM STRESS CARDIAC COMPONENT: ICD-10-PCS | Mod: S$GLB,,, | Performed by: INTERNAL MEDICINE

## 2019-04-09 PROCEDURE — 78452 HT MUSCLE IMAGE SPECT MULT: CPT | Mod: 26,,, | Performed by: INTERNAL MEDICINE

## 2019-04-09 PROCEDURE — 78452 NM MULTI PHARM STRESS CARDIAC COMPONENT: ICD-10-PCS | Mod: 26,,, | Performed by: INTERNAL MEDICINE

## 2019-04-09 PROCEDURE — 93015 CV STRESS TEST SUPVJ I&R: CPT | Mod: S$GLB,,, | Performed by: INTERNAL MEDICINE

## 2019-04-09 PROCEDURE — A9502 TC99M TETROFOSMIN: HCPCS

## 2019-04-10 ENCOUNTER — TELEPHONE (OUTPATIENT)
Dept: CARDIOLOGY | Facility: CLINIC | Age: 59
End: 2019-04-10

## 2019-04-10 NOTE — TELEPHONE ENCOUNTER
Telephoned patient with results of stress test and advised of need for Heart cath with Dr. Lawton  Confirmed for 4/16/19 at 1030 advised [phone] admit for IV hydration, reviewed NPO, continuing Aspirin and Brilinta, questions answered and patient ready to proceed.

## 2019-04-10 NOTE — TELEPHONE ENCOUNTER
----- Message from Enrico Vallejo MD sent at 4/10/2019 11:32 AM CDT -----  Pt has positive nuclear stress.  Talked to Dr. larry this morning. Please schedule C next week with Leighann.  Cr 2.0 chronic  Hold surgery.  THX

## 2019-04-16 ENCOUNTER — HOSPITAL ENCOUNTER (OUTPATIENT)
Facility: HOSPITAL | Age: 59
Discharge: HOME | End: 2019-04-16
Attending: INTERNAL MEDICINE | Admitting: INTERNAL MEDICINE
Payer: COMMERCIAL

## 2019-04-16 VITALS
BODY MASS INDEX: 32.78 KG/M2 | RESPIRATION RATE: 15 BRPM | OXYGEN SATURATION: 99 % | TEMPERATURE: 98 F | HEIGHT: 72 IN | DIASTOLIC BLOOD PRESSURE: 64 MMHG | WEIGHT: 242 LBS | SYSTOLIC BLOOD PRESSURE: 109 MMHG | HEART RATE: 62 BPM

## 2019-04-16 DIAGNOSIS — I25.708 ATHEROSCLEROSIS OF CABG W OTH ANGINA PECTORIS: Primary | ICD-10-CM

## 2019-04-16 DIAGNOSIS — R94.39 ABNORMAL STRESS TEST: ICD-10-CM

## 2019-04-16 DIAGNOSIS — I25.10 ATHEROSCLEROTIC HEART DISEASE OF NATIVE CORONARY ARTERY WITHOUT ANGINA PECTORIS: ICD-10-CM

## 2019-04-16 LAB
ANION GAP SERPL CALC-SCNC: 10 MMOL/L (ref 8–16)
APTT BLDCRRT: 25.1 SEC (ref 21–32)
BASOPHILS # BLD AUTO: 0.02 K/UL (ref 0–0.2)
BASOPHILS NFR BLD: 0.4 % (ref 0–1.9)
BUN SERPL-MCNC: 30 MG/DL (ref 6–20)
CALCIUM SERPL-MCNC: 9.9 MG/DL (ref 8.7–10.5)
CHLORIDE SERPL-SCNC: 108 MMOL/L (ref 95–110)
CO2 SERPL-SCNC: 22 MMOL/L (ref 23–29)
CORONARY STENOSIS: ABNORMAL
CREAT SERPL-MCNC: 1.8 MG/DL (ref 0.5–1.4)
DIFFERENTIAL METHOD: ABNORMAL
EOSINOPHIL # BLD AUTO: 0.4 K/UL (ref 0–0.5)
EOSINOPHIL NFR BLD: 6.9 % (ref 0–8)
ERYTHROCYTE [DISTWIDTH] IN BLOOD BY AUTOMATED COUNT: 15.2 % (ref 11.5–14.5)
EST. GFR  (AFRICAN AMERICAN): 46 ML/MIN/1.73 M^2
EST. GFR  (NON AFRICAN AMERICAN): 40 ML/MIN/1.73 M^2
GLUCOSE SERPL-MCNC: 124 MG/DL (ref 70–110)
HCT VFR BLD AUTO: 38 % (ref 40–54)
HGB BLD-MCNC: 13.5 G/DL (ref 14–18)
INR PPP: 0.9 (ref 0.8–1.2)
LYMPHOCYTES # BLD AUTO: 1.5 K/UL (ref 1–4.8)
LYMPHOCYTES NFR BLD: 28.4 % (ref 18–48)
MCH RBC QN AUTO: 29.7 PG (ref 27–31)
MCHC RBC AUTO-ENTMCNC: 35.5 G/DL (ref 32–36)
MCV RBC AUTO: 84 FL (ref 82–98)
MONOCYTES # BLD AUTO: 0.5 K/UL (ref 0.3–1)
MONOCYTES NFR BLD: 9.9 % (ref 4–15)
NEUTROPHILS # BLD AUTO: 2.9 K/UL (ref 1.8–7.7)
NEUTROPHILS NFR BLD: 54.4 % (ref 38–73)
PLATELET # BLD AUTO: 191 K/UL (ref 150–350)
PMV BLD AUTO: 9.8 FL (ref 9.2–12.9)
POTASSIUM SERPL-SCNC: 3.6 MMOL/L (ref 3.5–5.1)
PROTHROMBIN TIME: 10.3 SEC (ref 9–12.5)
RBC # BLD AUTO: 4.55 M/UL (ref 4.6–6.2)
SODIUM SERPL-SCNC: 140 MMOL/L (ref 136–145)
WBC # BLD AUTO: 5.24 K/UL (ref 3.9–12.7)

## 2019-04-16 PROCEDURE — 85025 COMPLETE CBC W/AUTO DIFF WBC: CPT

## 2019-04-16 PROCEDURE — 80048 BASIC METABOLIC PNL TOTAL CA: CPT

## 2019-04-16 PROCEDURE — 25500020 PHARM REV CODE 255

## 2019-04-16 PROCEDURE — C1769 GUIDE WIRE: HCPCS

## 2019-04-16 PROCEDURE — 93455 CORONARY ART/GRFT ANGIO S&I: CPT | Mod: 26,,, | Performed by: INTERNAL MEDICINE

## 2019-04-16 PROCEDURE — 85730 THROMBOPLASTIN TIME PARTIAL: CPT

## 2019-04-16 PROCEDURE — 85610 PROTHROMBIN TIME: CPT

## 2019-04-16 PROCEDURE — 99152 MOD SED SAME PHYS/QHP 5/>YRS: CPT | Mod: ,,, | Performed by: INTERNAL MEDICINE

## 2019-04-16 PROCEDURE — 25000003 PHARM REV CODE 250

## 2019-04-16 PROCEDURE — 27200085 CATH LAB PROCEDURE

## 2019-04-16 PROCEDURE — 93455 CATH LAB PROCEDURE: ICD-10-PCS | Mod: 26,,, | Performed by: INTERNAL MEDICINE

## 2019-04-16 PROCEDURE — 63600175 PHARM REV CODE 636 W HCPCS

## 2019-04-16 PROCEDURE — 99152 CATH LAB PROCEDURE: ICD-10-PCS | Mod: ,,, | Performed by: INTERNAL MEDICINE

## 2019-04-16 RX ORDER — DIPHENHYDRAMINE HCL 50 MG
50 CAPSULE ORAL ONCE
Status: COMPLETED | OUTPATIENT
Start: 2019-04-16 | End: 2019-04-16

## 2019-04-16 RX ORDER — DIAZEPAM 5 MG/1
5 TABLET ORAL
Status: DISCONTINUED | OUTPATIENT
Start: 2019-04-16 | End: 2019-04-16 | Stop reason: HOSPADM

## 2019-04-16 RX ORDER — SODIUM CHLORIDE 9 MG/ML
INJECTION, SOLUTION INTRAVENOUS CONTINUOUS
Status: DISCONTINUED | OUTPATIENT
Start: 2019-04-16 | End: 2019-04-16 | Stop reason: HOSPADM

## 2019-04-16 RX ORDER — NAPROXEN SODIUM 220 MG/1
81 TABLET, FILM COATED ORAL ONCE
Status: COMPLETED | OUTPATIENT
Start: 2019-04-16 | End: 2019-04-16

## 2019-04-16 RX ADMIN — DIAZEPAM 5 MG: 5 TABLET ORAL at 09:04

## 2019-04-16 RX ADMIN — SODIUM CHLORIDE: 9 INJECTION, SOLUTION INTRAVENOUS at 08:04

## 2019-04-16 RX ADMIN — NAPROXEN SODIUM 81 MG: 220 TABLET, FILM COATED ORAL at 09:04

## 2019-04-16 RX ADMIN — Medication 50 MG: at 09:04

## 2019-04-16 NOTE — BRIEF OP NOTE
Date: 04/16/2019  Surgeon/Physician: Sharon Lawton MD  Assistants: none    Pre Op Diagnosis: abnormal stress test    Post OP Diagnosis: abnormal stress test    Procedure Performed:lhc grafts    ANESTHESIA:RN IV SEDATION    COMPLICATION:none     Specimen / Tissue Removed: No    Estimated Blood Loss: <50 cc    Prostetics/Devices: None    Findings / Operative Note:  Occluded lad rca lcx   svg to om patent .  Lima to lad patent with diffuse distal lad disease that progressed. Occluded diagonal         PLAN:  Maximize medical therapy   Risk factor modification      Discharge Note  Short Stay      SUMMARY     Admit Date: 4/16/2019    Attending Physician: Vincent Lawton MD     Discharge Physician: Sharon Lawton MD     Discharge Date: 4/16/2019    Final Diagnosis: cad abnormal stress test cardiomyopathy    Outcome of Stay:patient tolerated procedure well. eh ahs progression of cad and has more disease in the lad downstream from lima as well as occlusion of diagonal . He ahs a patent stent to svg to om. He needs continued medical therapyand will follow up with DR DOLL. HE IS DEEMED STABLE FOR DISCHARGE.     Disposition: Home or Self Care    Patient Instructions:   Current Discharge Medication List      CONTINUE these medications which have NOT CHANGED    Details   amLODIPine (NORVASC) 5 MG tablet Take 1 tablet (5 mg total) by mouth once daily.  Qty: 90 tablet, Refills: 3      aspirin 81 MG Chew Take 81 mg by mouth once daily.      furosemide (LASIX) 40 MG tablet Take 1 tablet (40 mg total) by mouth once daily.  Qty: 30 tablet, Refills: 11      isosorbide mononitrate (IMDUR) 60 MG 24 hr tablet Take 1 tablet (60 mg total) by mouth 2 (two) times daily.  Qty: 60 tablet, Refills: 5    Comments: This prescription was filled on 11/20/2018. Any refills authorized will be placed on file.      metoprolol tartrate (LOPRESSOR) 100 MG tablet TAKE ONE TABLET BY MOUTH TWICE DAILY  Qty: 180 tablet, Refills: 2    Comments: This  "prescription was filled on 11/20/2018. Any refills authorized will be placed on file.      nitroGLYCERIN (NITROSTAT) 0.4 MG SL tablet Place 1 tablet (0.4 mg total) under the tongue every 5 (five) minutes as needed for Chest pain.  Qty: 25 tablet, Refills: 11    Comments: This prescription was filled on 2/28/2019. Any refills authorized will be placed on file.      olmesartan (BENICAR) 5 MG Tab Take 1 tablet (5 mg total) by mouth once daily.  Qty: 90 tablet, Refills: 3      RANEXA 500 mg Tb12 TAKE ONE TABLET BY MOUTH TWICE DAILY  Qty: 180 tablet, Refills: 3    Associated Diagnoses: Atherosclerosis of native coronary artery of native heart without angina pectoris; Coronary artery disease involving native coronary artery of native heart without angina pectoris      rosuvastatin (CRESTOR) 40 MG Tab Take 40 mg by mouth once daily.  Refills: 1      TOUJEO SOLOSTAR U-300 INSULIN 300 unit/mL (1.5 mL) InPn pen INJECT 60 UNITS SUBCUTANEOUSLY ONCE A DAY  Refills: 5      TRULICITY 1.5 mg/0.5 mL PnIj INJECT 1.5 MG SUBCUTANEOUSLY ONCE WEEKLY  Refills: 5      ACCU-CHEK MULTICLIX LANCET lancets TEST DAILY AS DIRECTED  Qty: 102 each, Refills: 11      FREESTYLE LITE STRIPS Strp CHECK BLOOD SUGAR FOUR TIMES DAILY  Refills: 3      levothyroxine (SYNTHROID) 75 MCG tablet Take 1 tablet (75 mcg total) by mouth once daily.  Qty: 30 tablet, Refills: 11      SURE COMFORT PEN NEEDLE 31 gauge x 5/16" Ndle AS DIRECTED  Qty: 100 each, Refills: 3    Comments: This prescription was filled on 1/22/2018. Any refills authorized will be placed on file.         STOP taking these medications       BRILINTA 90 mg tablet Comments:   Reason for Stopping:               Discharge Procedure Orders (must include Diet, Follow-up, Activity)   Discharge Procedure Orders (must include Diet, Follow-up, Activity)   Diet general     Call MD for:  temperature >100.4     Call MD for:  persistent nausea and vomiting     Call MD for:  severe uncontrolled pain     Call MD " for:  difficulty breathing, headache or visual disturbances     Call MD for:  redness, tenderness, or signs of infection (pain, swelling, redness, odor or green/yellow discharge around incision site)     Call MD for:  hives     Call MD for:  persistent dizziness or light-headedness     Call MD for:  extreme fatigue     Follow-up Information     Enrico MD Yoni In 1 week.    Specialties:  Cardiology, Cardiovascular Disease  Contact information:  [address]  [place] [address] [phone]

## 2019-04-16 NOTE — INTERVAL H&P NOTE
The patient has been examined and the H&P has been reviewed:    I concur with the findings and no changes have occurred since H&P was written.  haddecrease in ef with abnormal cardiolite  S/p pci with sevre native cad   Anesthesia/Surgery risks, benefits and alternative options discussed and understood by patient/family.  I have explained the risks, benefits , and alternatives of the procedure in detail.the patient voices understanding and all questions have been answered.the patient agrees to proceed as planned.          Active Hospital Problems    Diagnosis  POA    Abnormal stress test [R94.39]  Yes     Priority: High      Resolved Hospital Problems   No resolved problems to display.

## 2019-04-16 NOTE — PLAN OF CARE
1400 VSS  DIET TAKEN 100% DISCHARGE INSTRUCTIONS GIVEN TO PT AND WIFE AMB IN UNIT LT WRIST DRESSING DRY AND INTACT WITH IMMOBILIZER ON CM AND PIV DC'D REDRESSED. 1420 DISCHARGED PER W/C WITH BELONGINGS ACCOMPANIED BY THIS RN AND FAMILY

## 2019-04-24 ENCOUNTER — OFFICE VISIT (OUTPATIENT)
Dept: CARDIOLOGY | Facility: CLINIC | Age: 59
End: 2019-04-24
Payer: COMMERCIAL

## 2019-04-24 VITALS
WEIGHT: 239.63 LBS | BODY MASS INDEX: 32.46 KG/M2 | SYSTOLIC BLOOD PRESSURE: 118 MMHG | DIASTOLIC BLOOD PRESSURE: 68 MMHG | HEART RATE: 77 BPM | HEIGHT: 72 IN

## 2019-04-24 DIAGNOSIS — E11.22 CKD STAGE 3 DUE TO TYPE 2 DIABETES MELLITUS: ICD-10-CM

## 2019-04-24 DIAGNOSIS — I25.118 ATHEROSCLEROSIS OF NATIVE CORONARY ARTERY OF NATIVE HEART WITH STABLE ANGINA PECTORIS: ICD-10-CM

## 2019-04-24 DIAGNOSIS — I25.10 CORONARY ARTERY DISEASE INVOLVING NATIVE CORONARY ARTERY OF NATIVE HEART WITHOUT ANGINA PECTORIS: Primary | ICD-10-CM

## 2019-04-24 DIAGNOSIS — E11.59 HYPERTENSION ASSOCIATED WITH DIABETES: ICD-10-CM

## 2019-04-24 DIAGNOSIS — E11.8 TYPE 2 DIABETES MELLITUS WITH COMPLICATION, WITH LONG-TERM CURRENT USE OF INSULIN: ICD-10-CM

## 2019-04-24 DIAGNOSIS — Z79.4 TYPE 2 DIABETES MELLITUS WITH COMPLICATION, WITH LONG-TERM CURRENT USE OF INSULIN: ICD-10-CM

## 2019-04-24 DIAGNOSIS — Z95.1 HX OF CABG: ICD-10-CM

## 2019-04-24 DIAGNOSIS — I25.708 ATHEROSCLEROSIS OF CABG W OTH ANGINA PECTORIS: ICD-10-CM

## 2019-04-24 DIAGNOSIS — I15.2 HYPERTENSION ASSOCIATED WITH DIABETES: ICD-10-CM

## 2019-04-24 DIAGNOSIS — E78.5 HYPERLIPIDEMIA ASSOCIATED WITH TYPE 2 DIABETES MELLITUS: ICD-10-CM

## 2019-04-24 DIAGNOSIS — E11.69 HYPERLIPIDEMIA ASSOCIATED WITH TYPE 2 DIABETES MELLITUS: ICD-10-CM

## 2019-04-24 DIAGNOSIS — N18.30 CKD STAGE 3 DUE TO TYPE 2 DIABETES MELLITUS: ICD-10-CM

## 2019-04-24 DIAGNOSIS — Z01.810 PREOP CARDIOVASCULAR EXAM: ICD-10-CM

## 2019-04-24 PROCEDURE — 99214 PR OFFICE/OUTPT VISIT, EST, LEVL IV, 30-39 MIN: ICD-10-PCS | Mod: S$GLB,,, | Performed by: INTERNAL MEDICINE

## 2019-04-24 PROCEDURE — 99999 PR PBB SHADOW E&M-EST. PATIENT-LVL III: ICD-10-PCS | Mod: PBBFAC,,, | Performed by: INTERNAL MEDICINE

## 2019-04-24 PROCEDURE — 3074F PR MOST RECENT SYSTOLIC BLOOD PRESSURE < 130 MM HG: ICD-10-PCS | Mod: CPTII,S$GLB,, | Performed by: INTERNAL MEDICINE

## 2019-04-24 PROCEDURE — 3044F PR MOST RECENT HEMOGLOBIN A1C LEVEL <7.0%: ICD-10-PCS | Mod: CPTII,S$GLB,, | Performed by: INTERNAL MEDICINE

## 2019-04-24 PROCEDURE — 3008F BODY MASS INDEX DOCD: CPT | Mod: CPTII,S$GLB,, | Performed by: INTERNAL MEDICINE

## 2019-04-24 PROCEDURE — 3074F SYST BP LT 130 MM HG: CPT | Mod: CPTII,S$GLB,, | Performed by: INTERNAL MEDICINE

## 2019-04-24 PROCEDURE — 99999 PR PBB SHADOW E&M-EST. PATIENT-LVL III: CPT | Mod: PBBFAC,,, | Performed by: INTERNAL MEDICINE

## 2019-04-24 PROCEDURE — 99214 OFFICE O/P EST MOD 30 MIN: CPT | Mod: S$GLB,,, | Performed by: INTERNAL MEDICINE

## 2019-04-24 PROCEDURE — 3078F PR MOST RECENT DIASTOLIC BLOOD PRESSURE < 80 MM HG: ICD-10-PCS | Mod: CPTII,S$GLB,, | Performed by: INTERNAL MEDICINE

## 2019-04-24 PROCEDURE — 3044F HG A1C LEVEL LT 7.0%: CPT | Mod: CPTII,S$GLB,, | Performed by: INTERNAL MEDICINE

## 2019-04-24 PROCEDURE — 3078F DIAST BP <80 MM HG: CPT | Mod: CPTII,S$GLB,, | Performed by: INTERNAL MEDICINE

## 2019-04-24 PROCEDURE — 3008F PR BODY MASS INDEX (BMI) DOCUMENTED: ICD-10-PCS | Mod: CPTII,S$GLB,, | Performed by: INTERNAL MEDICINE

## 2019-04-24 NOTE — PROGRESS NOTES
Subjective:   Patient ID:  Christiano Cox is a 59 y.o. male who presents for follow up of Coronary Artery Disease      60 yo male, preop of cervical spinal surgery at [facility] center by dr. Mendoza Wray [phone]  He's f/u at cardiology service  PMH CAD s/p CABg x3 in 2001 and PCI in 2016, NIDDM, HTN, HLD  5/16/16 LHC due to NSTEMI showed occluded rca, lad and lcx, occluded svg to pda, patent lima to lad and Normal filling pressure. Successful PCI with GRAHAM to SVG to OM1  Echo in  EF 30 to 35%, mild to moderate MR/TR, decreased EF from 45% two yrs ago.  Works as .  Yard work caused SOB and fatigue. Decreased exercise capacity compared to two yrs ago  Angina pain 3 to 4 times a week by exertional and emotional stress. Resting or taking NTG SL x1 controlled the pain  Sleeps with 3 pillows for the whole life.  EKG in  NSR, PVC and LVH/LBBB  LHC done in  by Dr. Lawton: patent LIMA to LAD and SVG to OM1.  of SVG to RCA. The patient had progression of native disease compared to last angio in 2016.  No recurrent angina pain after LHC      Past Medical History:   Diagnosis Date    Acute coronary syndrome     CKD stage 3 due to type 2 diabetes mellitus     Coronary artery disease involving native coronary artery without angina pectoris 9/13/2016    Coronary atherosclerosis of unspecified type of vessel, native or graft     s/p cabg and stents    DM (diabetes mellitus), type 2 with complications     History of gout     Hyperlipidemia associated with type 2 diabetes mellitus     Hypertension associated with diabetes     NSTEMI (non-ST elevated myocardial infarction) 5/16/2016    Obesity, unspecified     SOFIYA on CPAP     S/P CABG x 3     Type II diabetes mellitus with renal manifestations        Past Surgical History:   Procedure Laterality Date    BACK SURGERY      CARDIAC CATHETERIZATION      CATHETERIZATION, HEART, LEFT Left 4/16/2019    Performed by Vincent MARTINES  MD [facility] CATH LAB    COLONOSCOPY N/A 7/3/2014    Performed by Pipo Woodruff MD at [facility] ENDO    CORONARY ANGIOPLASTY      CORONARY ARTERY BYPASS GRAFT         Social History     Tobacco Use    Smoking status: Never Smoker    Smokeless tobacco: Never Used   Substance Use Topics    Alcohol use: No     Frequency: Never    Drug use: No       Family History   Problem Relation Age of Onset    Diabetes Mother     Hypertension Father     Stroke Sister     Diabetes Sister     Heart disease Brother     Diabetes Brother     Hypertension Brother          Review of Systems   Constitution: Negative for decreased appetite, diaphoresis, fever, malaise/fatigue and night sweats.   HENT: Negative for nosebleeds.    Eyes: Negative for blurred vision and double vision.   Cardiovascular: Positive for chest pain and dyspnea on exertion. Negative for claudication, irregular heartbeat, leg swelling, near-syncope, orthopnea, palpitations, paroxysmal nocturnal dyspnea and syncope.   Respiratory: Negative for cough, shortness of breath, sleep disturbances due to breathing, snoring, sputum production and wheezing.    Endocrine: Negative for cold intolerance and polyuria.   Hematologic/Lymphatic: Does not bruise/bleed easily.   Skin: Negative for rash.   Musculoskeletal: Negative for back pain, falls, joint pain, joint swelling and neck pain.   Gastrointestinal: Negative for abdominal pain, heartburn, nausea and vomiting.   Genitourinary: Negative for dysuria, frequency and hematuria.   Neurological: Negative for difficulty with concentration, dizziness, focal weakness, headaches, light-headedness, numbness, seizures and weakness.   Psychiatric/Behavioral: Negative for depression, memory loss and substance abuse. The patient does not have insomnia.    Allergic/Immunologic: Negative for HIV exposure and hives.       Objective:   Physical Exam   Constitutional: He is oriented to person, place, and time. He appears  well-nourished.   HENT:   Head: Normocephalic.   Eyes: Pupils are equal, round, and reactive to light.   Neck: Normal carotid pulses and no JVD present. Carotid bruit is not present. No thyromegaly present.   Cardiovascular: Normal rate, regular rhythm, normal heart sounds and normal pulses.  No extrasystoles are present. PMI is not displaced. Exam reveals no gallop and no S3.   No murmur heard.  Pulmonary/Chest: Breath sounds normal. No stridor. No respiratory distress.   Abdominal: Soft. Bowel sounds are normal. There is no tenderness. There is no rebound.   Musculoskeletal: Normal range of motion. He exhibits edema.   trace+ edema   Neurological: He is alert and oriented to person, place, and time.   Skin: Skin is intact. No rash noted.   Psychiatric: His behavior is normal.       Lab Results   Component Value Date    CHOL 169 03/08/2019    CHOL 138 09/28/2018    CHOL 155 10/10/2017     Lab Results   Component Value Date    HDL 45 03/08/2019    HDL 26 (L) 09/28/2018    HDL 36 (L) 10/10/2017     Lab Results   Component Value Date    LDLCALC 110 03/08/2019    LDLCALC 90.2 09/28/2018    LDLCALC 100.4 10/10/2017     Lab Results   Component Value Date    TRIG 109 09/28/2018    TRIG 93 10/10/2017    TRIG 108 06/05/2017     Lab Results   Component Value Date    CHOLHDL 18.8 (L) 09/28/2018    CHOLHDL 23.2 10/10/2017    CHOLHDL 21.6 02/14/2017       Chemistry        Component Value Date/Time     04/16/2019 0800    K 3.6 04/16/2019 0800     04/16/2019 0800    CO2 22 (L) 04/16/2019 0800    BUN 30 (H) 04/16/2019 0800    CREATININE 1.8 (H) 04/16/2019 0800     (H) 04/16/2019 0800        Component Value Date/Time    CALCIUM 9.9 04/16/2019 0800    ALKPHOS 37 (L) 09/30/2018 0531    AST 34 09/30/2018 0531    ALT 45 (H) 09/30/2018 0531    BILITOT 0.4 09/30/2018 0531    ESTGFRAFRICA 46 (A) 04/16/2019 0800    EGFRNONAA 40 (A) 04/16/2019 0800          Lab Results   Component Value Date    LABA1C 6.9 03/08/2019     HGBA1C 7.2 (H) 09/28/2018     Lab Results   Component Value Date    TSH 2.895 09/28/2018     Lab Results   Component Value Date    INR 0.9 04/16/2019    INR 1.1 09/28/2018    INR 1.0 05/15/2016     Lab Results   Component Value Date    WBC 5.24 04/16/2019    HGB 13.5 (L) 04/16/2019    HCT 38.0 (L) 04/16/2019    MCV 84 04/16/2019     04/16/2019     BMP  Sodium   Date Value Ref Range Status   04/16/2019 140 136 - 145 mmol/L Final     Potassium   Date Value Ref Range Status   04/16/2019 3.6 3.5 - 5.1 mmol/L Final     Chloride   Date Value Ref Range Status   04/16/2019 108 95 - 110 mmol/L Final     CO2   Date Value Ref Range Status   04/16/2019 22 (L) 23 - 29 mmol/L Final     BUN, Bld   Date Value Ref Range Status   04/16/2019 30 (H) 6 - 20 mg/dL Final     Creatinine   Date Value Ref Range Status   04/16/2019 1.8 (H) 0.5 - 1.4 mg/dL Final     Calcium   Date Value Ref Range Status   04/16/2019 9.9 8.7 - 10.5 mg/dL Final     Anion Gap   Date Value Ref Range Status   04/16/2019 10 8 - 16 mmol/L Final     eGFR if    Date Value Ref Range Status   04/16/2019 46 (A) >60 mL/min/1.73 m^2 Final     eGFR if non    Date Value Ref Range Status   04/16/2019 40 (A) >60 mL/min/1.73 m^2 Final     Comment:     Calculation used to obtain the estimated glomerular filtration  rate (eGFR) is the CKD-EPI equation.        BNP  @LABRCNTIP(BNP,BNPTRIAGEBLO)@  @LABRCNTIP(troponini)@  CrCl cannot be calculated (Patient's most recent lab result is older than the maximum 7 days allowed.).  No results found in the last 24 hours.  No results found in the last 24 hours.  No results found in the last 24 hours.    Assessment:      1. Coronary artery disease involving native coronary artery of native heart without angina pectoris    2. Atherosclerosis of CABG w oth angina pectoris    3. Hypertension associated with diabetes    4. Hyperlipidemia associated with type 2 diabetes mellitus    5. Atherosclerosis of native  coronary artery of native heart with stable angina pectoris    6. Type 2 diabetes mellitus with complication, with long-term current use of insulin    7. CKD stage 3 due to type 2 diabetes mellitus    8. Preop cardiovascular exam    9. Hx of CABG        Plan:   cpntinue crestor 40 mg  And ok to decrease to 20 mg due to muscle fatigue and weakness  recheck Lipid profile in 3 months. Will order PCSK9 inhibtor if indicated  Continue benicar, BB,Imdur ranexa and asa and brilinta  Recommend heart-healthy diet, weight control and regular exercise.  Tom. Risk modification.   RTC in 4 months    I have reviewed all pertinent labs and cardiac studies. Plans and recommendations have been formulated under my direct supervision. All questions answered and patient voiced understanding. Patient to continue current medications.   Elevated periop risk of CV events for non-high risk procedure.  Good functional and exercise capacity.  No chest pain, active arrhythmia and CHF symptoms.  Ok to proceed the scheduled surgery without further cardiac study.  OK to hold Aspirin 5 to 7 days before the procedure and resume ASAP postop.  Continue Metoprolol and Statin periop.  Avoid periop fluid overloaded.

## 2019-04-28 RX ORDER — PEN NEEDLE, DIABETIC 31 GX3/16"
NEEDLE, DISPOSABLE MISCELLANEOUS
Qty: 100 EACH | Refills: 3 | Status: SHIPPED | OUTPATIENT
Start: 2019-04-28

## 2019-05-07 ENCOUNTER — PATIENT OUTREACH (OUTPATIENT)
Dept: ADMINISTRATIVE | Facility: HOSPITAL | Age: 59
End: 2019-05-07

## 2019-05-13 ENCOUNTER — TELEPHONE (OUTPATIENT)
Dept: CARDIOLOGY | Facility: CLINIC | Age: 59
End: 2019-05-13

## 2019-05-14 PROBLEM — Z01.810 PREOP CARDIOVASCULAR EXAM: Status: RESOLVED | Noted: 2019-04-03 | Resolved: 2019-05-14

## 2019-05-14 RX ORDER — AMLODIPINE BESYLATE 5 MG/1
TABLET ORAL
Qty: 90 TABLET | Refills: 3 | Status: SHIPPED | OUTPATIENT
Start: 2019-05-14 | End: 2019-07-19 | Stop reason: ALTCHOICE

## 2019-05-20 ENCOUNTER — TELEPHONE (OUTPATIENT)
Dept: INTERNAL MEDICINE | Facility: CLINIC | Age: 59
End: 2019-05-20

## 2019-05-20 NOTE — TELEPHONE ENCOUNTER
----- Message from Althea Wild sent at 5/20/2019  9:15 AM CDT -----  Contact: Self  Type:  Sooner Apoointment Request    Caller is requesting a sooner appointment.  Caller declined first available appointment listed below.  Caller will not accept being placed on the waitlist and is requesting a message be sent to doctor.  Name of Caller:Christiano  When is the first available appointment?06/03/19  Symptoms:pre op clearance  Would the patient rather a call back or a response via [facility]? call  Best Call Back Number:[phone]  Additional Information: patient is scheduled to have surgery on 06/07/19

## 2019-05-20 NOTE — TELEPHONE ENCOUNTER
Spoke with patients wife patient request to see only Dr. Gómez appointment has been scheduled for 06/03/19.

## 2019-05-31 ENCOUNTER — PATIENT OUTREACH (OUTPATIENT)
Dept: ADMINISTRATIVE | Facility: HOSPITAL | Age: 59
End: 2019-05-31

## 2019-06-03 ENCOUNTER — OFFICE VISIT (OUTPATIENT)
Dept: INTERNAL MEDICINE | Facility: CLINIC | Age: 59
End: 2019-06-03
Payer: COMMERCIAL

## 2019-06-03 VITALS
BODY MASS INDEX: 32.97 KG/M2 | TEMPERATURE: 98 F | HEART RATE: 71 BPM | DIASTOLIC BLOOD PRESSURE: 76 MMHG | SYSTOLIC BLOOD PRESSURE: 120 MMHG | WEIGHT: 243.38 LBS | OXYGEN SATURATION: 96 % | HEIGHT: 72 IN | RESPIRATION RATE: 18 BRPM

## 2019-06-03 DIAGNOSIS — M50.00 CERVICAL DISC DISEASE WITH MYELOPATHY: ICD-10-CM

## 2019-06-03 DIAGNOSIS — Z01.818 PREOP EXAMINATION: Primary | ICD-10-CM

## 2019-06-03 DIAGNOSIS — I15.2 HYPERTENSION ASSOCIATED WITH DIABETES: ICD-10-CM

## 2019-06-03 DIAGNOSIS — E11.8 TYPE 2 DIABETES MELLITUS WITH COMPLICATION, WITH LONG-TERM CURRENT USE OF INSULIN: ICD-10-CM

## 2019-06-03 DIAGNOSIS — I25.10 CORONARY ARTERY DISEASE INVOLVING NATIVE CORONARY ARTERY OF NATIVE HEART WITHOUT ANGINA PECTORIS: ICD-10-CM

## 2019-06-03 DIAGNOSIS — E11.21 TYPE 2 DIABETES MELLITUS WITH DIABETIC NEPHROPATHY, WITH LONG-TERM CURRENT USE OF INSULIN: ICD-10-CM

## 2019-06-03 DIAGNOSIS — N18.30 CKD STAGE 3 DUE TO TYPE 2 DIABETES MELLITUS: ICD-10-CM

## 2019-06-03 DIAGNOSIS — E11.22 CKD STAGE 3 DUE TO TYPE 2 DIABETES MELLITUS: ICD-10-CM

## 2019-06-03 DIAGNOSIS — E78.5 HYPERLIPIDEMIA ASSOCIATED WITH TYPE 2 DIABETES MELLITUS: ICD-10-CM

## 2019-06-03 DIAGNOSIS — Z79.4 TYPE 2 DIABETES MELLITUS WITH COMPLICATION, WITH LONG-TERM CURRENT USE OF INSULIN: ICD-10-CM

## 2019-06-03 DIAGNOSIS — G47.33 OSA ON CPAP: ICD-10-CM

## 2019-06-03 DIAGNOSIS — E11.69 HYPERLIPIDEMIA ASSOCIATED WITH TYPE 2 DIABETES MELLITUS: ICD-10-CM

## 2019-06-03 DIAGNOSIS — Z79.4 TYPE 2 DIABETES MELLITUS WITH DIABETIC NEPHROPATHY, WITH LONG-TERM CURRENT USE OF INSULIN: ICD-10-CM

## 2019-06-03 DIAGNOSIS — E11.59 HYPERTENSION ASSOCIATED WITH DIABETES: ICD-10-CM

## 2019-06-03 PROCEDURE — 3008F PR BODY MASS INDEX (BMI) DOCUMENTED: ICD-10-PCS | Mod: CPTII,S$GLB,, | Performed by: INTERNAL MEDICINE

## 2019-06-03 PROCEDURE — 99999 PR PBB SHADOW E&M-EST. PATIENT-LVL III: CPT | Mod: PBBFAC,,, | Performed by: INTERNAL MEDICINE

## 2019-06-03 PROCEDURE — 3008F BODY MASS INDEX DOCD: CPT | Mod: CPTII,S$GLB,, | Performed by: INTERNAL MEDICINE

## 2019-06-03 PROCEDURE — 3078F DIAST BP <80 MM HG: CPT | Mod: CPTII,S$GLB,, | Performed by: INTERNAL MEDICINE

## 2019-06-03 PROCEDURE — 3044F HG A1C LEVEL LT 7.0%: CPT | Mod: CPTII,S$GLB,, | Performed by: INTERNAL MEDICINE

## 2019-06-03 PROCEDURE — 99214 OFFICE O/P EST MOD 30 MIN: CPT | Mod: S$GLB,,, | Performed by: INTERNAL MEDICINE

## 2019-06-03 PROCEDURE — 3074F PR MOST RECENT SYSTOLIC BLOOD PRESSURE < 130 MM HG: ICD-10-PCS | Mod: CPTII,S$GLB,, | Performed by: INTERNAL MEDICINE

## 2019-06-03 PROCEDURE — 3078F PR MOST RECENT DIASTOLIC BLOOD PRESSURE < 80 MM HG: ICD-10-PCS | Mod: CPTII,S$GLB,, | Performed by: INTERNAL MEDICINE

## 2019-06-03 PROCEDURE — 3074F SYST BP LT 130 MM HG: CPT | Mod: CPTII,S$GLB,, | Performed by: INTERNAL MEDICINE

## 2019-06-03 PROCEDURE — 99214 PR OFFICE/OUTPT VISIT, EST, LEVL IV, 30-39 MIN: ICD-10-PCS | Mod: S$GLB,,, | Performed by: INTERNAL MEDICINE

## 2019-06-03 PROCEDURE — 3044F PR MOST RECENT HEMOGLOBIN A1C LEVEL <7.0%: ICD-10-PCS | Mod: CPTII,S$GLB,, | Performed by: INTERNAL MEDICINE

## 2019-06-03 PROCEDURE — 99999 PR PBB SHADOW E&M-EST. PATIENT-LVL III: ICD-10-PCS | Mod: PBBFAC,,, | Performed by: INTERNAL MEDICINE

## 2019-06-03 NOTE — PROGRESS NOTES
HPI:  Patient is a 59-year-old man who comes in today for preoperative clearance to have surgery on cervical disc.  He will be having a several level fusion.  Patient otherwise is doing well.  He has no chest pain at this time.  He had a heart catheterization about 6 weeks ago that was fairly unremarkable.  All of his stents and grafts were open.  His blood sugars have been doing well.  he denies any hypoglycemic events.  His blood pressures been well controlled.  There are no other complaints      Current MEDS: medcard review, verified and update  Allergies: Per the electronic medical record    Past Medical History:   Diagnosis Date    Acute coronary syndrome     CKD stage 3 due to type 2 diabetes mellitus     Coronary artery disease involving native coronary artery without angina pectoris 9/13/2016    Coronary atherosclerosis of unspecified type of vessel, native or graft     s/p cabg and stents    DM (diabetes mellitus), type 2 with complications     History of gout     Hyperlipidemia associated with type 2 diabetes mellitus     Hypertension associated with diabetes     NSTEMI (non-ST elevated myocardial infarction) 5/16/2016    Obesity, unspecified     SOFIYA on CPAP     S/P CABG x 3     Type II diabetes mellitus with renal manifestations        Past Surgical History:   Procedure Laterality Date    BACK SURGERY      CARDIAC CATHETERIZATION      CATHETERIZATION, HEART, LEFT Left 4/16/2019    Performed by [facility] at [facility] CATH LAB    COLONOSCOPY N/A 7/3/2014    Performed by Pipo Woodruff MD at [facility] ENDO    CORONARY ANGIOPLASTY      CORONARY ARTERY BYPASS GRAFT         SHx: per the electronic medical record    FHx: recorded in the electronic medical record    ROS:    denies any chest pains or shortness of breath. Denies any nausea, vomiting or diarrhea. Denies any fever, chills or sweats. Denies any change in weight, voice, stool, skin or hair. Denies any dysuria, dyspepsia or  dysphagia. Denies any change in vision, hearing or headaches. Denies any swollen lymph nodes or loss of memory.    PE:  /76   Pulse 71   Temp 98.2 °F (36.8 °C)   Resp 18   Ht 6' (1.829 m)   Wt 110.4 kg (243 lb 6.2 oz)   SpO2 96%   BMI 33.01 kg/m²   Gen: Well-developed, well-nourished, male, in no acute distress, oriented x3  HEENT: neck is supple, no adenopathy, carotids 2+ equal without bruits, thyroid exam normal size without nodules.  CHEST: clear to auscultation and percussion  CVS: regular rate and rhythm without significant murmur, gallop, or rubs  ABD: soft, benign, no rebound no guarding, no distention.  Bowel sounds are normal.     nontender.  No palpable masses.  No organomegaly and no audible bruits.      Lab Results   Component Value Date    WBC 5.24 04/16/2019    HGB 13.5 (L) 04/16/2019    HCT 38.0 (L) 04/16/2019     04/16/2019    CHOL 169 03/08/2019    TRIG 109 09/28/2018    HDL 45 03/08/2019    ALT 45 (H) 09/30/2018    AST 34 09/30/2018     04/16/2019    K 3.6 04/16/2019     04/16/2019    CREATININE 1.8 (H) 04/16/2019    BUN 30 (H) 04/16/2019    CO2 22 (L) 04/16/2019    TSH 2.895 09/28/2018    PSA 0.70 10/10/2017    INR 0.9 04/16/2019    HGBA1C 7.2 (H) 09/28/2018    Coags were normal  Chest x-ray prior to his heart catheterization was normal  EKG showed PACs and nonspecific ST T wave changes.    Impression:  Cervical disc disease  Numerous other medical problems below, stable  Patient Active Problem List   Diagnosis    Coronary atherosclerosis    DM (diabetes mellitus), type 2 with complications    SOFIYA on CPAP    Type II diabetes mellitus with renal manifestations    Hypertension associated with diabetes    Hyperlipidemia associated with type 2 diabetes mellitus    History of gout    Lumbar radiculopathy    Olecranon bone spur    Olecranon bursitis of right elbow    Coronary artery disease involving native coronary artery without angina pectoris    Central  sleep apnea due to Cheyne-Pérez respiration    Periodic limb movement disorder (PLMD)    Restless legs syndrome (RLS)    Shift work sleep disorder    Poor sleep hygiene    CKD stage 3 due to type 2 diabetes mellitus    Atherosclerosis of CABG w oth angina pectoris    Abnormal stress test       Plan:      Patient is cleared for his anesthesia and surgery.  He has no contraindications.  He is at slightly higher increased risk due to his comorbid conditions above.  These have all been maximally optimized.  He should not take aspirin Ranexa our ability a 1 week prior to his surgery.  He should not take Lasix the morning of surgery.  He should take half of his insulin dosage the night before.  All other medications may be continued throughout.  His forms have been completed and sent to his surgeon.    This note is generated with speech recognition software and is subject to transcription error and sound alike phrases that may be missed by proofreading.

## 2019-06-25 ENCOUNTER — PATIENT OUTREACH (OUTPATIENT)
Dept: ADMINISTRATIVE | Facility: HOSPITAL | Age: 59
End: 2019-06-25

## 2019-06-25 DIAGNOSIS — Z95.1 HX OF CABG: ICD-10-CM

## 2019-06-25 DIAGNOSIS — I25.10 ATHEROSCLEROSIS OF NATIVE CORONARY ARTERY OF NATIVE HEART WITHOUT ANGINA PECTORIS: ICD-10-CM

## 2019-06-25 DIAGNOSIS — I25.10 CORONARY ARTERY DISEASE INVOLVING NATIVE CORONARY ARTERY OF NATIVE HEART WITHOUT ANGINA PECTORIS: ICD-10-CM

## 2019-06-25 RX ORDER — RANOLAZINE 500 MG/1
TABLET, FILM COATED, EXTENDED RELEASE ORAL
Qty: 180 TABLET | Refills: 3 | Status: SHIPPED | OUTPATIENT
Start: 2019-06-25

## 2019-06-25 RX ORDER — TICAGRELOR 90 MG/1
TABLET ORAL
Qty: 180 TABLET | Refills: 3 | Status: SHIPPED | OUTPATIENT
Start: 2019-06-25

## 2019-07-08 ENCOUNTER — TELEPHONE (OUTPATIENT)
Dept: CARDIOLOGY | Facility: CLINIC | Age: 59
End: 2019-07-08

## 2019-07-08 NOTE — TELEPHONE ENCOUNTER
Spoke with pt's wife states pt can come see Dr. Vallejo next Friday 7/19/19 [facility].      I was unable to add pt on Dr. Vallejo schedule due to block. I have sent message to Blayne to add pt on schedule.

## 2019-07-08 NOTE — TELEPHONE ENCOUNTER
Please contact patient to arrange cardiac clearance for carotid surgery with Dr. Ag. He can schedule sometime next week with Dr. Vallejo. Please see if he can bring records from recent admission. Wife can be reached at [phone]

## 2019-07-18 ENCOUNTER — PATIENT OUTREACH (OUTPATIENT)
Dept: ADMINISTRATIVE | Facility: HOSPITAL | Age: 59
End: 2019-07-18

## 2019-07-19 ENCOUNTER — OFFICE VISIT (OUTPATIENT)
Dept: CARDIOLOGY | Facility: CLINIC | Age: 59
End: 2019-07-19
Payer: COMMERCIAL

## 2019-07-19 ENCOUNTER — TELEPHONE (OUTPATIENT)
Dept: PHARMACY | Facility: CLINIC | Age: 59
End: 2019-07-19

## 2019-07-19 VITALS
HEART RATE: 64 BPM | WEIGHT: 217.63 LBS | BODY MASS INDEX: 29.48 KG/M2 | HEIGHT: 72 IN | DIASTOLIC BLOOD PRESSURE: 66 MMHG | SYSTOLIC BLOOD PRESSURE: 100 MMHG

## 2019-07-19 DIAGNOSIS — Z01.810 PREOP CARDIOVASCULAR EXAM: Primary | ICD-10-CM

## 2019-07-19 DIAGNOSIS — N18.30 CKD STAGE 3 DUE TO TYPE 2 DIABETES MELLITUS: ICD-10-CM

## 2019-07-19 DIAGNOSIS — E11.69 HYPERLIPIDEMIA ASSOCIATED WITH TYPE 2 DIABETES MELLITUS: ICD-10-CM

## 2019-07-19 DIAGNOSIS — E11.22 CKD STAGE 3 DUE TO TYPE 2 DIABETES MELLITUS: ICD-10-CM

## 2019-07-19 DIAGNOSIS — I50.22 CHRONIC SYSTOLIC CONGESTIVE HEART FAILURE: ICD-10-CM

## 2019-07-19 DIAGNOSIS — I15.2 HYPERTENSION ASSOCIATED WITH DIABETES: ICD-10-CM

## 2019-07-19 DIAGNOSIS — E78.5 HYPERLIPIDEMIA ASSOCIATED WITH TYPE 2 DIABETES MELLITUS: ICD-10-CM

## 2019-07-19 DIAGNOSIS — Z79.4 TYPE 2 DIABETES MELLITUS WITH COMPLICATION, WITH LONG-TERM CURRENT USE OF INSULIN: ICD-10-CM

## 2019-07-19 DIAGNOSIS — I65.22 STENOSIS OF LEFT CAROTID ARTERY: ICD-10-CM

## 2019-07-19 DIAGNOSIS — I25.708 ATHEROSCLEROSIS OF CABG W OTH ANGINA PECTORIS: ICD-10-CM

## 2019-07-19 DIAGNOSIS — E11.8 TYPE 2 DIABETES MELLITUS WITH COMPLICATION, WITH LONG-TERM CURRENT USE OF INSULIN: ICD-10-CM

## 2019-07-19 DIAGNOSIS — E11.59 HYPERTENSION ASSOCIATED WITH DIABETES: ICD-10-CM

## 2019-07-19 PROCEDURE — 99214 PR OFFICE/OUTPT VISIT, EST, LEVL IV, 30-39 MIN: ICD-10-PCS | Mod: S$GLB,,, | Performed by: INTERNAL MEDICINE

## 2019-07-19 PROCEDURE — 3008F PR BODY MASS INDEX (BMI) DOCUMENTED: ICD-10-PCS | Mod: CPTII,S$GLB,, | Performed by: INTERNAL MEDICINE

## 2019-07-19 PROCEDURE — 3044F HG A1C LEVEL LT 7.0%: CPT | Mod: CPTII,S$GLB,, | Performed by: INTERNAL MEDICINE

## 2019-07-19 PROCEDURE — 3074F PR MOST RECENT SYSTOLIC BLOOD PRESSURE < 130 MM HG: ICD-10-PCS | Mod: CPTII,S$GLB,, | Performed by: INTERNAL MEDICINE

## 2019-07-19 PROCEDURE — 99214 OFFICE O/P EST MOD 30 MIN: CPT | Mod: S$GLB,,, | Performed by: INTERNAL MEDICINE

## 2019-07-19 PROCEDURE — 3044F PR MOST RECENT HEMOGLOBIN A1C LEVEL <7.0%: ICD-10-PCS | Mod: CPTII,S$GLB,, | Performed by: INTERNAL MEDICINE

## 2019-07-19 PROCEDURE — 3078F PR MOST RECENT DIASTOLIC BLOOD PRESSURE < 80 MM HG: ICD-10-PCS | Mod: CPTII,S$GLB,, | Performed by: INTERNAL MEDICINE

## 2019-07-19 PROCEDURE — 3074F SYST BP LT 130 MM HG: CPT | Mod: CPTII,S$GLB,, | Performed by: INTERNAL MEDICINE

## 2019-07-19 PROCEDURE — 3078F DIAST BP <80 MM HG: CPT | Mod: CPTII,S$GLB,, | Performed by: INTERNAL MEDICINE

## 2019-07-19 PROCEDURE — 99999 PR PBB SHADOW E&M-EST. PATIENT-LVL III: CPT | Mod: PBBFAC,,, | Performed by: INTERNAL MEDICINE

## 2019-07-19 PROCEDURE — 99999 PR PBB SHADOW E&M-EST. PATIENT-LVL III: ICD-10-PCS | Mod: PBBFAC,,, | Performed by: INTERNAL MEDICINE

## 2019-07-19 PROCEDURE — 3008F BODY MASS INDEX DOCD: CPT | Mod: CPTII,S$GLB,, | Performed by: INTERNAL MEDICINE

## 2019-07-19 RX ORDER — CARVEDILOL 12.5 MG/1
12.5 TABLET ORAL 2 TIMES DAILY WITH MEALS
Qty: 60 TABLET | Refills: 5 | Status: SHIPPED | OUTPATIENT
Start: 2019-07-19

## 2019-07-19 NOTE — PROGRESS NOTES
Subjective:   Patient ID:  Christiano Cox is a 59 y.o. male who presents for follow up of Hypertension; Hyperlipidemia; and Coronary Artery Disease      60 yo male, preop of left CEA by Dr. Ag at [facility] [place]. Works as .  PMH CAD s/p CABg x3 in 2001 and PCI in 2016, CHfrEF 35%, recent stroke post cervical procedure done in , carotid artery, NIDDM in 10yr, HTN, HLD  5/16/16 LHC due to NSTEMI showed occluded rca, lad and lcx, occluded svg to pda, patent lima to lad and Normal filling pressure. Successful PCI with GRAHAM to SVG to OM1  12/2018 Echo EF 30 to 35%, mild to moderate MR/TR, decreased EF from 45% two yrs ago.  02/2019 EKG in  NSR, PVC and LVH/LBBB  04/2019 saw him for preop clearance of cervical spinal surgery at [facility] by dr. Mendoza Wray. He c/o angina. Subsequent LHC showed 3 vessels , patent LIMA and diffuse disease of distal LAD. SVG to OM1 stent patent and to RCA . The patient had progression of native disease compared to last angio in 2016. Continue medical Rx.  07/07/2019 had cervical procedre and discharged next day. One lday later, c/o right had weakness, and SOB. Admitted to [facility] [place] again. Dx of stroke and CHF. Per famiy EF 35%. Carotid US showed left 60 to 79% lesion and planed to have CEA on left side Dr Ag   Now in rehab program.  No chest pain, dyspnea, dizziness, orthopnea and pND      Past Medical History:   Diagnosis Date    Acute coronary syndrome     Chronic systolic congestive heart failure 7/19/2019    CKD stage 3 due to type 2 diabetes mellitus     Coronary artery disease involving native coronary artery without angina pectoris 9/13/2016    Coronary atherosclerosis of unspecified type of vessel, native or graft     s/p cabg and stents    DM (diabetes mellitus), type 2 with complications     History of gout     Hyperlipidemia associated with type 2 diabetes mellitus     Hypertension associated with diabetes     NSTEMI (non-ST elevated  myocardial infarction) 5/16/2016    Obesity, unspecified     SOFIYA on CPAP     S/P CABG x 3     Stenosis of left carotid artery 7/19/2019    Type II diabetes mellitus with renal manifestations        Past Surgical History:   Procedure Laterality Date    BACK SURGERY      CARDIAC CATHETERIZATION      CATHETERIZATION, HEART, LEFT Left 4/16/2019    Performed by Vincent [facility]ton [facility] at [facility] CATH LAB    COLONOSCOPY N/A 7/3/2014    Performed by Pipo Woodruff MD at [facility] ENDO    CORONARY ANGIOPLASTY      CORONARY ARTERY BYPASS GRAFT         Social History     Tobacco Use    Smoking status: Never Smoker    Smokeless tobacco: Never Used   Substance Use Topics    Alcohol use: No     Frequency: Never    Drug use: No       Family History   Problem Relation Age of Onset    Diabetes Mother     Hypertension Father     Stroke Sister     Diabetes Sister     Heart disease Brother     Diabetes Brother     Hypertension Brother          Review of Systems   Constitution: Negative for decreased appetite, diaphoresis, fever, malaise/fatigue and night sweats.   HENT: Negative for nosebleeds.    Eyes: Negative for blurred vision and double vision.   Cardiovascular: Positive for dyspnea on exertion. Negative for chest pain, claudication, irregular heartbeat, leg swelling, near-syncope, orthopnea, palpitations, paroxysmal nocturnal dyspnea and syncope.   Respiratory: Negative for cough, shortness of breath, sleep disturbances due to breathing, snoring, sputum production and wheezing.    Endocrine: Negative for cold intolerance and polyuria.   Hematologic/Lymphatic: Does not bruise/bleed easily.   Skin: Negative for rash.   Musculoskeletal: Negative for back pain, falls, joint pain, joint swelling and neck pain.   Gastrointestinal: Negative for abdominal pain, heartburn, nausea and vomiting.   Genitourinary: Negative for dysuria, frequency and hematuria.   Neurological: Negative for difficulty with concentration,  dizziness, focal weakness, headaches, light-headedness, numbness, seizures and weakness.   Psychiatric/Behavioral: Negative for depression, memory loss and substance abuse. The patient does not have insomnia.    Allergic/Immunologic: Negative for HIV exposure and hives.       Objective:   Physical Exam   Constitutional: He is oriented to person, place, and time. He appears well-nourished.   HENT:   Head: Normocephalic.   Eyes: Pupils are equal, round, and reactive to light.   Neck: Normal carotid pulses and no JVD present. Carotid bruit is not present. No thyromegaly present.   Cardiovascular: Normal rate, regular rhythm, normal heart sounds and normal pulses.  No extrasystoles are present. PMI is not displaced. Exam reveals no gallop and no S3.   No murmur heard.  Pulmonary/Chest: Breath sounds normal. No stridor. No respiratory distress.   Abdominal: Soft. Bowel sounds are normal. There is no tenderness. There is no rebound.   Musculoskeletal: Normal range of motion. He exhibits edema.   trace+ edema   Neurological: He is alert and oriented to person, place, and time.   Skin: Skin is intact. No rash noted.   Psychiatric: His behavior is normal.       Lab Results   Component Value Date    CHOL 169 03/08/2019    CHOL 138 09/28/2018    CHOL 155 10/10/2017     Lab Results   Component Value Date    HDL 45 03/08/2019    HDL 26 (L) 09/28/2018    HDL 36 (L) 10/10/2017     Lab Results   Component Value Date    LDLCALC 110 03/08/2019    LDLCALC 90.2 09/28/2018    LDLCALC 100.4 10/10/2017     Lab Results   Component Value Date    TRIG 109 09/28/2018    TRIG 93 10/10/2017    TRIG 108 06/05/2017     Lab Results   Component Value Date    CHOLHDL 18.8 (L) 09/28/2018    CHOLHDL 23.2 10/10/2017    CHOLHDL 21.6 02/14/2017       Chemistry        Component Value Date/Time     04/16/2019 0800    K 3.6 04/16/2019 0800     04/16/2019 0800    CO2 22 (L) 04/16/2019 0800    BUN 30 (H) 04/16/2019 0800    CREATININE 1.8 (H)  04/16/2019 0800     (H) 04/16/2019 0800        Component Value Date/Time    CALCIUM 9.9 04/16/2019 0800    ALKPHOS 37 (L) 09/30/2018 0531    AST 34 09/30/2018 0531    ALT 45 (H) 09/30/2018 0531    BILITOT 0.4 09/30/2018 0531    ESTGFRAFRICA 46 (A) 04/16/2019 0800    EGFRNONAA 40 (A) 04/16/2019 0800          Lab Results   Component Value Date    LABA1C 6.9 03/08/2019    HGBA1C 7.2 (H) 09/28/2018     Lab Results   Component Value Date    TSH 2.895 09/28/2018     Lab Results   Component Value Date    INR 0.9 04/16/2019    INR 1.1 09/28/2018    INR 1.0 05/15/2016     Lab Results   Component Value Date    WBC 5.24 04/16/2019    HGB 13.5 (L) 04/16/2019    HCT 38.0 (L) 04/16/2019    MCV 84 04/16/2019     04/16/2019     BMP  Sodium   Date Value Ref Range Status   04/16/2019 140 136 - 145 mmol/L Final     Potassium   Date Value Ref Range Status   04/16/2019 3.6 3.5 - 5.1 mmol/L Final     Chloride   Date Value Ref Range Status   04/16/2019 108 95 - 110 mmol/L Final     CO2   Date Value Ref Range Status   04/16/2019 22 (L) 23 - 29 mmol/L Final     BUN, Bld   Date Value Ref Range Status   04/16/2019 30 (H) 6 - 20 mg/dL Final     Creatinine   Date Value Ref Range Status   04/16/2019 1.8 (H) 0.5 - 1.4 mg/dL Final     Calcium   Date Value Ref Range Status   04/16/2019 9.9 8.7 - 10.5 mg/dL Final     Anion Gap   Date Value Ref Range Status   04/16/2019 10 8 - 16 mmol/L Final     eGFR if    Date Value Ref Range Status   04/16/2019 46 (A) >60 mL/min/1.73 m^2 Final     eGFR if non    Date Value Ref Range Status   04/16/2019 40 (A) >60 mL/min/1.73 m^2 Final     Comment:     Calculation used to obtain the estimated glomerular filtration  rate (eGFR) is the CKD-EPI equation.        BNP  @LABRCNTIP(BNP,BNPTRIAGEBLO)@  @LABRCNTIP(troponini)@  CrCl cannot be calculated (Patient's most recent lab result is older than the maximum 7 days allowed.).  No results found in the last 24 hours.  No results  found in the last 24 hours.  No results found in the last 24 hours.    Assessment:      1. Preop cardiovascular exam    2. Chronic systolic congestive heart failure    3. Atherosclerosis of CABG w oth angina pectoris    4. Hyperlipidemia associated with type 2 diabetes mellitus    5. Hypertension associated with diabetes    6. CKD stage 3 due to type 2 diabetes mellitus    7. Type 2 diabetes mellitus with complication, with long-term current use of insulin    8. Stenosis of left carotid artery      Cp and CHF controlled  BP and A1c wnl  High LDL  Recent C in    Plan:   D/c lopressor and amlodipine  Add coreg 12.5 mg bid  Check BP and pulse daily and report in one week  Add Rapatha   continue crestor 40 mg daily  Continue lasix 40 mg daily, ASA, Brilinta, Ranexa, imdur, Benicar   Check BMP and BNP now    Elevated periop risk of CV events for high risk procedure.  Good functional and exercise capacity.  No chest pain, active arrhythmia and CHF symptoms.  Ok to proceed the scheduled surgery without further cardiac study.  OK to hold Aspirin and brilinta 5 to 7 days before the procedure and resume ASAP postop.  Continue coreg and Statin periop.  Avoid periop fluid overloaded.    RTC in 2 months

## 2019-07-19 NOTE — TELEPHONE ENCOUNTER
Informed Spouse Layla  that [facility] received prescription for Repatha and prior authorization is required.  [facility] will be back in touch once insurance determination is received.

## 2019-07-24 ENCOUNTER — LAB VISIT (OUTPATIENT)
Dept: LAB | Facility: HOSPITAL | Age: 59
End: 2019-07-24
Attending: INTERNAL MEDICINE
Payer: COMMERCIAL

## 2019-07-24 DIAGNOSIS — I50.22 CHRONIC SYSTOLIC CONGESTIVE HEART FAILURE: ICD-10-CM

## 2019-07-24 LAB
ANION GAP SERPL CALC-SCNC: 9 MMOL/L (ref 8–16)
BNP SERPL-MCNC: 184 PG/ML (ref 0–99)
BUN SERPL-MCNC: 23 MG/DL (ref 6–20)
CALCIUM SERPL-MCNC: 10.2 MG/DL (ref 8.7–10.5)
CHLORIDE SERPL-SCNC: 106 MMOL/L (ref 95–110)
CO2 SERPL-SCNC: 27 MMOL/L (ref 23–29)
CREAT SERPL-MCNC: 1.7 MG/DL (ref 0.5–1.4)
EST. GFR  (AFRICAN AMERICAN): 49.6 ML/MIN/1.73 M^2
EST. GFR  (NON AFRICAN AMERICAN): 42.9 ML/MIN/1.73 M^2
GLUCOSE SERPL-MCNC: 83 MG/DL (ref 70–110)
POTASSIUM SERPL-SCNC: 3.9 MMOL/L (ref 3.5–5.1)
SODIUM SERPL-SCNC: 142 MMOL/L (ref 136–145)

## 2019-07-24 PROCEDURE — 83880 ASSAY OF NATRIURETIC PEPTIDE: CPT

## 2019-07-24 PROCEDURE — 80048 BASIC METABOLIC PNL TOTAL CA: CPT

## 2019-07-24 PROCEDURE — 36415 COLL VENOUS BLD VENIPUNCTURE: CPT | Mod: PO

## 2019-07-24 NOTE — TELEPHONE ENCOUNTER
FOR DOCUMENTATION ONLY:  Financial Assistance for Repatha approved from 07/24/19 to 07/23/20  Source [facility]  BIN: [id number]  PCN: [id number]  Id: [id number]  GRP:[id number]

## 2019-08-02 NOTE — TELEPHONE ENCOUNTER
Repatha SURECLICK consultation, injection training and shipment attempted.  No answer.  M for call back.  Sally messaged.  $5 copay in 004.

## 2019-08-06 RX ORDER — ISOSORBIDE MONONITRATE 60 MG/1
60 TABLET, EXTENDED RELEASE ORAL DAILY
Qty: 60 TABLET | Refills: 5
Start: 2019-08-06

## 2019-08-09 NOTE — TELEPHONE ENCOUNTER
Initial Repatha 140mg/ml Sureclick PENS consult completed on 19 over the phone with patient and wife on the line.  Repatha 140mg/ml Sureclick PENS will be shipped on 19 to arrive at patient's home on 19 via [employer]. $ 5 copay (CC info added to file). Patient will start  Repatha 140mg/ml Sureclick PENS  on  8/15/19 (their wedding anniversary!) and dose every  and 15 of each month. Address confirmed. Confirmed 2 patient identifiers - name and . Therapy Appropriate.  --Injection experience: Currently on Toujeo + Trulicity. He is comfortable with proceeding with self injections at home after reviewing information verbally.  video and instructional PDF sent to patient's personal email. He does not know his [employer] acct login.    Counseled patient on administration directions:  - Inject 140mg (1 pen) into the skin every 14 days.   - Take out of the refrigerator 30-60 minutes prior to injection.  - Wash hands before and after injection.  - Monthly RX will come with gauze, bandaids, and alcohol swabs.  - Patient may inject in either the tops of the thighs, abdomen- but at least 2 inches away from her belly button, or the outer part of her upper arm.  Patient was instructed to rotate injections sites.  - Patient is to wipe down the injection site with the alcohol pad, wait to dry.  Gently squeeze the area of the cleaned skin and hold it firmly.   Place the pen flat against the raised area of skin that is being squeezed, then push down on the button and release,  in 10-15 seconds you will hear a click and the window will go from from clear to yellow, indicating injection is complete.  - Patient should rotate injection sites.   - Patient will use sharps container; once full, per [place] law, she/ he may lock the sharps container and place in her trash. She/ he can then contact the Pharmacy and we will replace the sharps at no additional charge.    Patient was counseled on possible side effects:  -  Injection site reaction: redness, soreness, itching, bruising, which should resolve within 3-5 days.  - flu-like symptoms    DDIs: Medication list reviewed with patient - no DDIs. Patient advised to call [facility] prior to starting any new medications - OTC, Rx or herbal. Advised to continue Crestor until further direction given by MD    Storage: advised to keep refrigerated for stability until expiration on the box, but room temperature is ok if used within 30 days. Pt advised to keep in the fridge in an area that will not freeze or get too warm.    Diet and exercise recommendations offered, but patient declines.    Patient did not have any further questions. She was advised to keep a calendar to stay compliant. Consultation included: indication; goals of treatment; administration; storage and handling; side effects; how to handle side effects; the importance of compliance; how to handle missed doses; the importance of laboratory monitoring; the importance of keeping all follow up appointments.  Patient understands to report any medication changes to [facility] and provider. All questions answered and addressed to patients satisfaction. I will f/u with her in 1 week from start, [facility] to contact patient in 3 weeks for refills.

## 2019-08-13 ENCOUNTER — OFFICE VISIT (OUTPATIENT)
Dept: INTERNAL MEDICINE | Facility: CLINIC | Age: 59
End: 2019-08-13
Payer: COMMERCIAL

## 2019-08-13 ENCOUNTER — LAB VISIT (OUTPATIENT)
Dept: LAB | Facility: HOSPITAL | Age: 59
End: 2019-08-13
Attending: INTERNAL MEDICINE
Payer: COMMERCIAL

## 2019-08-13 VITALS
HEIGHT: 72 IN | WEIGHT: 215.19 LBS | DIASTOLIC BLOOD PRESSURE: 74 MMHG | HEART RATE: 74 BPM | TEMPERATURE: 97 F | RESPIRATION RATE: 18 BRPM | SYSTOLIC BLOOD PRESSURE: 122 MMHG | BODY MASS INDEX: 29.15 KG/M2 | OXYGEN SATURATION: 99 %

## 2019-08-13 DIAGNOSIS — Z87.39 HISTORY OF GOUT: ICD-10-CM

## 2019-08-13 DIAGNOSIS — R06.3 CENTRAL SLEEP APNEA DUE TO CHEYNE-STOKES RESPIRATION: ICD-10-CM

## 2019-08-13 DIAGNOSIS — Z00.00 ROUTINE GENERAL MEDICAL EXAMINATION AT A HEALTH CARE FACILITY: Primary | ICD-10-CM

## 2019-08-13 DIAGNOSIS — Z79.4 TYPE 2 DIABETES MELLITUS WITH COMPLICATION, WITH LONG-TERM CURRENT USE OF INSULIN: ICD-10-CM

## 2019-08-13 DIAGNOSIS — E11.21 TYPE 2 DIABETES MELLITUS WITH DIABETIC NEPHROPATHY, WITH LONG-TERM CURRENT USE OF INSULIN: ICD-10-CM

## 2019-08-13 DIAGNOSIS — M54.16 LUMBAR RADICULOPATHY: ICD-10-CM

## 2019-08-13 DIAGNOSIS — Z79.4 TYPE 2 DIABETES MELLITUS WITH DIABETIC NEPHROPATHY, WITH LONG-TERM CURRENT USE OF INSULIN: ICD-10-CM

## 2019-08-13 DIAGNOSIS — I65.22 STENOSIS OF LEFT CAROTID ARTERY: ICD-10-CM

## 2019-08-13 DIAGNOSIS — I15.2 HYPERTENSION ASSOCIATED WITH DIABETES: ICD-10-CM

## 2019-08-13 DIAGNOSIS — E11.22 CKD STAGE 3 DUE TO TYPE 2 DIABETES MELLITUS: ICD-10-CM

## 2019-08-13 DIAGNOSIS — I25.118 ATHEROSCLEROSIS OF NATIVE CORONARY ARTERY OF NATIVE HEART WITH STABLE ANGINA PECTORIS: ICD-10-CM

## 2019-08-13 DIAGNOSIS — I25.10 CORONARY ARTERY DISEASE INVOLVING NATIVE CORONARY ARTERY OF NATIVE HEART WITHOUT ANGINA PECTORIS: ICD-10-CM

## 2019-08-13 DIAGNOSIS — E11.69 HYPERLIPIDEMIA ASSOCIATED WITH TYPE 2 DIABETES MELLITUS: ICD-10-CM

## 2019-08-13 DIAGNOSIS — E78.5 HYPERLIPIDEMIA ASSOCIATED WITH TYPE 2 DIABETES MELLITUS: ICD-10-CM

## 2019-08-13 DIAGNOSIS — I50.22 CHRONIC SYSTOLIC CONGESTIVE HEART FAILURE: ICD-10-CM

## 2019-08-13 DIAGNOSIS — E11.8 TYPE 2 DIABETES MELLITUS WITH COMPLICATION, WITH LONG-TERM CURRENT USE OF INSULIN: ICD-10-CM

## 2019-08-13 DIAGNOSIS — N18.30 CKD STAGE 3 DUE TO TYPE 2 DIABETES MELLITUS: ICD-10-CM

## 2019-08-13 DIAGNOSIS — G47.61 PERIODIC LIMB MOVEMENT DISORDER (PLMD): ICD-10-CM

## 2019-08-13 DIAGNOSIS — E11.59 HYPERTENSION ASSOCIATED WITH DIABETES: ICD-10-CM

## 2019-08-13 DIAGNOSIS — Z12.5 PROSTATE CANCER SCREENING: ICD-10-CM

## 2019-08-13 DIAGNOSIS — G47.33 OSA ON CPAP: ICD-10-CM

## 2019-08-13 LAB
ALBUMIN SERPL BCP-MCNC: 3.7 G/DL (ref 3.5–5.2)
ALP SERPL-CCNC: 58 U/L (ref 55–135)
ALT SERPL W/O P-5'-P-CCNC: 26 U/L (ref 10–44)
ANION GAP SERPL CALC-SCNC: 10 MMOL/L (ref 8–16)
AST SERPL-CCNC: 29 U/L (ref 10–40)
BASOPHILS # BLD AUTO: 0.03 K/UL (ref 0–0.2)
BASOPHILS NFR BLD: 0.6 % (ref 0–1.9)
BILIRUB SERPL-MCNC: 0.5 MG/DL (ref 0.1–1)
BUN SERPL-MCNC: 25 MG/DL (ref 6–20)
CALCIUM SERPL-MCNC: 10.4 MG/DL (ref 8.7–10.5)
CHLORIDE SERPL-SCNC: 101 MMOL/L (ref 95–110)
CHOLEST SERPL-MCNC: 137 MG/DL (ref 120–199)
CHOLEST/HDLC SERPL: 4.4 {RATIO} (ref 2–5)
CO2 SERPL-SCNC: 24 MMOL/L (ref 23–29)
COMPLEXED PSA SERPL-MCNC: 0.86 NG/ML (ref 0–4)
CREAT SERPL-MCNC: 1.9 MG/DL (ref 0.5–1.4)
DIFFERENTIAL METHOD: ABNORMAL
EOSINOPHIL # BLD AUTO: 0.3 K/UL (ref 0–0.5)
EOSINOPHIL NFR BLD: 5.5 % (ref 0–8)
ERYTHROCYTE [DISTWIDTH] IN BLOOD BY AUTOMATED COUNT: 15.2 % (ref 11.5–14.5)
EST. GFR  (AFRICAN AMERICAN): 43.3 ML/MIN/1.73 M^2
EST. GFR  (NON AFRICAN AMERICAN): 37.5 ML/MIN/1.73 M^2
ESTIMATED AVG GLUCOSE: 126 MG/DL (ref 68–131)
GLUCOSE SERPL-MCNC: 107 MG/DL (ref 70–110)
HBA1C MFR BLD HPLC: 6 % (ref 4–5.6)
HCT VFR BLD AUTO: 37.8 % (ref 40–54)
HDLC SERPL-MCNC: 31 MG/DL (ref 40–75)
HDLC SERPL: 22.6 % (ref 20–50)
HGB BLD-MCNC: 12.8 G/DL (ref 14–18)
IMM GRANULOCYTES # BLD AUTO: 0.01 K/UL (ref 0–0.04)
IMM GRANULOCYTES NFR BLD AUTO: 0.2 % (ref 0–0.5)
LDLC SERPL CALC-MCNC: 85 MG/DL (ref 63–159)
LYMPHOCYTES # BLD AUTO: 1.4 K/UL (ref 1–4.8)
LYMPHOCYTES NFR BLD: 26.5 % (ref 18–48)
MCH RBC QN AUTO: 29.3 PG (ref 27–31)
MCHC RBC AUTO-ENTMCNC: 33.9 G/DL (ref 32–36)
MCV RBC AUTO: 87 FL (ref 82–98)
MONOCYTES # BLD AUTO: 0.5 K/UL (ref 0.3–1)
MONOCYTES NFR BLD: 9.4 % (ref 4–15)
NEUTROPHILS # BLD AUTO: 3.1 K/UL (ref 1.8–7.7)
NEUTROPHILS NFR BLD: 57.8 % (ref 38–73)
NONHDLC SERPL-MCNC: 106 MG/DL
NRBC BLD-RTO: 0 /100 WBC
PLATELET # BLD AUTO: 235 K/UL (ref 150–350)
PMV BLD AUTO: 10 FL (ref 9.2–12.9)
POTASSIUM SERPL-SCNC: 3.7 MMOL/L (ref 3.5–5.1)
PROT SERPL-MCNC: 7.6 G/DL (ref 6–8.4)
RBC # BLD AUTO: 4.37 M/UL (ref 4.6–6.2)
SODIUM SERPL-SCNC: 135 MMOL/L (ref 136–145)
TRIGL SERPL-MCNC: 105 MG/DL (ref 30–150)
TSH SERPL DL<=0.005 MIU/L-ACNC: 2.57 UIU/ML (ref 0.4–4)
WBC # BLD AUTO: 5.32 K/UL (ref 3.9–12.7)

## 2019-08-13 PROCEDURE — 99396 PR PREVENTIVE VISIT,EST,40-64: ICD-10-PCS | Mod: 25,S$GLB,, | Performed by: INTERNAL MEDICINE

## 2019-08-13 PROCEDURE — 84443 ASSAY THYROID STIM HORMONE: CPT

## 2019-08-13 PROCEDURE — 3044F PR MOST RECENT HEMOGLOBIN A1C LEVEL <7.0%: ICD-10-PCS | Mod: CPTII,S$GLB,, | Performed by: INTERNAL MEDICINE

## 2019-08-13 PROCEDURE — 90471 PNEUMOCOCCAL CONJUGATE VACCINE 13-VALENT LESS THAN 5YO & GREATER THAN: ICD-10-PCS | Mod: S$GLB,,, | Performed by: INTERNAL MEDICINE

## 2019-08-13 PROCEDURE — 90471 IMMUNIZATION ADMIN: CPT | Mod: S$GLB,,, | Performed by: INTERNAL MEDICINE

## 2019-08-13 PROCEDURE — 3078F PR MOST RECENT DIASTOLIC BLOOD PRESSURE < 80 MM HG: ICD-10-PCS | Mod: CPTII,S$GLB,, | Performed by: INTERNAL MEDICINE

## 2019-08-13 PROCEDURE — 84153 ASSAY OF PSA TOTAL: CPT

## 2019-08-13 PROCEDURE — 36415 COLL VENOUS BLD VENIPUNCTURE: CPT | Mod: PO

## 2019-08-13 PROCEDURE — 3078F DIAST BP <80 MM HG: CPT | Mod: CPTII,S$GLB,, | Performed by: INTERNAL MEDICINE

## 2019-08-13 PROCEDURE — 90670 PCV13 VACCINE IM: CPT | Mod: S$GLB,,, | Performed by: INTERNAL MEDICINE

## 2019-08-13 PROCEDURE — 80061 LIPID PANEL: CPT

## 2019-08-13 PROCEDURE — 3044F HG A1C LEVEL LT 7.0%: CPT | Mod: CPTII,S$GLB,, | Performed by: INTERNAL MEDICINE

## 2019-08-13 PROCEDURE — 3074F PR MOST RECENT SYSTOLIC BLOOD PRESSURE < 130 MM HG: ICD-10-PCS | Mod: CPTII,S$GLB,, | Performed by: INTERNAL MEDICINE

## 2019-08-13 PROCEDURE — 99396 PREV VISIT EST AGE 40-64: CPT | Mod: 25,S$GLB,, | Performed by: INTERNAL MEDICINE

## 2019-08-13 PROCEDURE — 90670 PNEUMOCOCCAL CONJUGATE VACCINE 13-VALENT LESS THAN 5YO & GREATER THAN: ICD-10-PCS | Mod: S$GLB,,, | Performed by: INTERNAL MEDICINE

## 2019-08-13 PROCEDURE — 3074F SYST BP LT 130 MM HG: CPT | Mod: CPTII,S$GLB,, | Performed by: INTERNAL MEDICINE

## 2019-08-13 PROCEDURE — 83036 HEMOGLOBIN GLYCOSYLATED A1C: CPT

## 2019-08-13 PROCEDURE — 99999 PR PBB SHADOW E&M-EST. PATIENT-LVL III: ICD-10-PCS | Mod: PBBFAC,,, | Performed by: INTERNAL MEDICINE

## 2019-08-13 PROCEDURE — 99999 PR PBB SHADOW E&M-EST. PATIENT-LVL III: CPT | Mod: PBBFAC,,, | Performed by: INTERNAL MEDICINE

## 2019-08-13 PROCEDURE — 85025 COMPLETE CBC W/AUTO DIFF WBC: CPT

## 2019-08-13 PROCEDURE — 80053 COMPREHEN METABOLIC PANEL: CPT

## 2019-08-13 RX ORDER — LEVOTHYROXINE SODIUM 75 UG/1
75 TABLET ORAL DAILY
Qty: 90 TABLET | Refills: 3 | Status: SHIPPED | OUTPATIENT
Start: 2019-08-13 | End: 2020-08-12

## 2019-08-13 NOTE — PROGRESS NOTES
HPI:  Patient is a 59-year-old man who comes today for follow-up of his diabetes, hypertension, lipids, coronary artery and peripheral tear disease. He had a successful surgery on his cervical spine about 2 months ago.  About 3 days after his surgery he had numbness and weakness in his right arm.  He was seen in the ER and admitted for evaluation.  He was found to have a significant left carotid artery stenosis.  He underwent CEA last week.  He has done well.  There have been no other new problems or complaints.  His blood sugars have been excellent.  There has been no problems for his blood pressure.  He has had no angina      Current MEDS: medcard review, verified and update  Allergies: Per the electronic medical record    Past Medical History:   Diagnosis Date    Acute coronary syndrome     Chronic systolic congestive heart failure 7/19/2019    CKD stage 3 due to type 2 diabetes mellitus     Coronary artery disease involving native coronary artery without angina pectoris 9/13/2016    Coronary atherosclerosis of unspecified type of vessel, native or graft     s/p cabg and stents    DM (diabetes mellitus), type 2 with complications     History of gout     Hyperlipidemia associated with type 2 diabetes mellitus     Hypertension associated with diabetes     NSTEMI (non-ST elevated myocardial infarction) 5/16/2016    Obesity, unspecified     SOFIYA on CPAP     S/P CABG x 3     Stenosis of left carotid artery 7/19/2019    Type II diabetes mellitus with renal manifestations        Past Surgical History:   Procedure Laterality Date    BACK SURGERY      CARDIAC CATHETERIZATION      CAROTID ENDARTERECTOMY Left 08/2019    CATHETERIZATION, HEART, LEFT Left 4/16/2019    Performed by Vincent Lawton MD at [facility] CATH LAB    COLONOSCOPY N/A 7/3/2014    Performed by Pipo Woodruff MD at [facility] ENDO    CORONARY ANGIOPLASTY      CORONARY ARTERY BYPASS GRAFT      NECK SURGERY      x2       SHx: per the  electronic medical record    FHx: recorded in the electronic medical record    ROS:    denies any chest pains or shortness of breath. Denies any nausea, vomiting or diarrhea. Denies any fever, chills or sweats. Denies any change in weight, voice, stool, skin or hair. Denies any dysuria, dyspepsia or dysphagia. Denies any change in vision, hearing or headaches. Denies any swollen lymph nodes or loss of memory.    PE:  /74   Pulse 74   Temp 97.4 °F (36.3 °C)   Resp 18   Ht 6' (1.829 m)   Wt 97.6 kg (215 lb 2.7 oz)   SpO2 99%   BMI 29.18 kg/m²   Gen: Well-developed, well-nourished, male, in no acute distress, oriented x3  HEENT: neck is supple, no adenopathy, carotids 2+ equal without bruits, thyroid exam normal size without nodules.  CHEST: clear to auscultation and percussion  CVS: regular rate and rhythm without significant murmur, gallop, or rubs  ABD: soft, benign, no rebound no guarding, no distention.  Bowel sounds are normal.     nontender.  No palpable masses.  No organomegaly and no audible bruits.  RECTAL: no masses.  Prostate 20  Grams without nodules.  EXT: no clubbing, cyanosis, or edema  LYMPH: no cervical, inguinal, or axillary adenopathy  FEET: no loss of sensation.  No ulcers or pressure sores.  Monofilament testing normal  NEURO: gait normal.  Cranial nerves II- XII intact. No nystagmus.  Speech normal.   Gross motor and sensory unremarkable.    Lab Results   Component Value Date    WBC 5.24 04/16/2019    HGB 13.5 (L) 04/16/2019    HCT 38.0 (L) 04/16/2019     04/16/2019    CHOL 169 03/08/2019    TRIG 109 09/28/2018    HDL 45 03/08/2019    ALT 45 (H) 09/30/2018    AST 34 09/30/2018     07/24/2019    K 3.9 07/24/2019     07/24/2019    CREATININE 1.7 (H) 07/24/2019    BUN 23 (H) 07/24/2019    CO2 27 07/24/2019    TSH 2.895 09/28/2018    PSA 0.70 10/10/2017    INR 0.9 04/16/2019    HGBA1C 7.2 (H) 09/28/2018       Impression:  Multiple medical problems below,  stable  Patient Active Problem List   Diagnosis    Coronary atherosclerosis    DM (diabetes mellitus), type 2 with complications    SOFIYA on CPAP    Type II diabetes mellitus with renal manifestations    Hypertension associated with diabetes    Hyperlipidemia associated with type 2 diabetes mellitus    History of gout    Lumbar radiculopathy    Olecranon bone spur    Olecranon bursitis of right elbow    Coronary artery disease involving native coronary artery without angina pectoris    Central sleep apnea due to Cheyne-Pérez respiration    Periodic limb movement disorder (PLMD)    Restless legs syndrome (RLS)    Shift work sleep disorder    Poor sleep hygiene    CKD stage 3 due to type 2 diabetes mellitus    Chronic systolic congestive heart failure    Stenosis of left carotid artery       Plan:   Orders Placed This Encounter    (In Office Administered) Pneumococcal Conjugate Vaccine (13 Valent) (IM)    Comprehensive metabolic panel    Hemoglobin A1c    CBC auto differential    Lipid panel    Protein / creatinine ratio, urine    TSH    PSA, Screening    Comprehensive metabolic panel    Hemoglobin A1c    Lipid panel    levothyroxine (SYNTHROID) 75 MCG tablet    He was given Prevnar vaccine today.  I encouraged him to get the shingles vaccine.  He will be seen again in 6 months.  He will have lab work done today and again in 6 months.    This note is generated with speech recognition software and is subject to transcription error and sound alike phrases that may be missed by proofreading.

## 2019-08-22 ENCOUNTER — OFFICE VISIT (OUTPATIENT)
Dept: CARDIOLOGY | Facility: CLINIC | Age: 59
End: 2019-08-22
Payer: COMMERCIAL

## 2019-08-22 VITALS
HEIGHT: 72 IN | BODY MASS INDEX: 29.35 KG/M2 | HEART RATE: 84 BPM | WEIGHT: 216.69 LBS | DIASTOLIC BLOOD PRESSURE: 70 MMHG | SYSTOLIC BLOOD PRESSURE: 120 MMHG

## 2019-08-22 DIAGNOSIS — Z79.4 TYPE 2 DIABETES MELLITUS WITH COMPLICATION, WITH LONG-TERM CURRENT USE OF INSULIN: ICD-10-CM

## 2019-08-22 DIAGNOSIS — G47.33 OSA ON CPAP: ICD-10-CM

## 2019-08-22 DIAGNOSIS — E11.8 TYPE 2 DIABETES MELLITUS WITH COMPLICATION, WITH LONG-TERM CURRENT USE OF INSULIN: ICD-10-CM

## 2019-08-22 DIAGNOSIS — I65.22 STENOSIS OF LEFT CAROTID ARTERY: ICD-10-CM

## 2019-08-22 DIAGNOSIS — E11.59 HYPERTENSION ASSOCIATED WITH DIABETES: ICD-10-CM

## 2019-08-22 DIAGNOSIS — I15.2 HYPERTENSION ASSOCIATED WITH DIABETES: ICD-10-CM

## 2019-08-22 DIAGNOSIS — I25.10 CORONARY ARTERY DISEASE INVOLVING NATIVE CORONARY ARTERY OF NATIVE HEART WITHOUT ANGINA PECTORIS: ICD-10-CM

## 2019-08-22 DIAGNOSIS — I50.22 CHRONIC SYSTOLIC CONGESTIVE HEART FAILURE: Primary | ICD-10-CM

## 2019-08-22 PROCEDURE — 3078F DIAST BP <80 MM HG: CPT | Mod: CPTII,S$GLB,, | Performed by: NURSE PRACTITIONER

## 2019-08-22 PROCEDURE — 3074F SYST BP LT 130 MM HG: CPT | Mod: CPTII,S$GLB,, | Performed by: NURSE PRACTITIONER

## 2019-08-22 PROCEDURE — 3044F HG A1C LEVEL LT 7.0%: CPT | Mod: CPTII,S$GLB,, | Performed by: NURSE PRACTITIONER

## 2019-08-22 PROCEDURE — 3044F PR MOST RECENT HEMOGLOBIN A1C LEVEL <7.0%: ICD-10-PCS | Mod: CPTII,S$GLB,, | Performed by: NURSE PRACTITIONER

## 2019-08-22 PROCEDURE — 3008F BODY MASS INDEX DOCD: CPT | Mod: CPTII,S$GLB,, | Performed by: NURSE PRACTITIONER

## 2019-08-22 PROCEDURE — 99999 PR PBB SHADOW E&M-EST. PATIENT-LVL IV: CPT | Mod: PBBFAC,,, | Performed by: NURSE PRACTITIONER

## 2019-08-22 PROCEDURE — 99999 PR PBB SHADOW E&M-EST. PATIENT-LVL IV: ICD-10-PCS | Mod: PBBFAC,,, | Performed by: NURSE PRACTITIONER

## 2019-08-22 PROCEDURE — 3074F PR MOST RECENT SYSTOLIC BLOOD PRESSURE < 130 MM HG: ICD-10-PCS | Mod: CPTII,S$GLB,, | Performed by: NURSE PRACTITIONER

## 2019-08-22 PROCEDURE — 3078F PR MOST RECENT DIASTOLIC BLOOD PRESSURE < 80 MM HG: ICD-10-PCS | Mod: CPTII,S$GLB,, | Performed by: NURSE PRACTITIONER

## 2019-08-22 PROCEDURE — 99214 OFFICE O/P EST MOD 30 MIN: CPT | Mod: S$GLB,,, | Performed by: NURSE PRACTITIONER

## 2019-08-22 PROCEDURE — 3008F PR BODY MASS INDEX (BMI) DOCUMENTED: ICD-10-PCS | Mod: CPTII,S$GLB,, | Performed by: NURSE PRACTITIONER

## 2019-08-22 PROCEDURE — 99214 PR OFFICE/OUTPT VISIT, EST, LEVL IV, 30-39 MIN: ICD-10-PCS | Mod: S$GLB,,, | Performed by: NURSE PRACTITIONER

## 2019-08-22 RX ORDER — NEBULIZER AND COMPRESSOR
EACH MISCELLANEOUS
Qty: 1 EACH | Refills: 0 | Status: SHIPPED | OUTPATIENT
Start: 2019-08-22

## 2019-08-22 NOTE — TELEPHONE ENCOUNTER
7 Day Post Start F/U for Repatha Sureclick.  Spoke to patient.  Confirmed name and . Start date confirmed 8/15/19. Patient self injected Repatha on his thigh without any difficulties.  He has one dose on hand in the fridge, next injection on 19.  Patient reports experiencing no side effects since beginning of therapy.  All questions answered and addressed to patients satisfaction.  [facility] will continue reaching out to patient monthly to arrange refills.

## 2019-08-22 NOTE — PROGRESS NOTES
Subjective:   Patient ID:  Christiano Cox is a 59 y.o. male who presents for follow up of CAD F/U      HPI  Mr. Cox's current medical conditions include CAD, NSTEMI, sleep apnea in which he uses CPAP nightly, NIDDM, HLP, HTN, CABG x3 in 2001.   He had 2D echo that shows decline in EF from 45% in 2016 to 35% in May 2017.      Patient underwent angiogram on 5/16/16 for NSTEMI that showed occluded rca, lad and lcx, occluded svg to pda, severe proximal stenosis of the svg to om treated with resolute stent and filter wire protection. Also noted to have patent lima to lad and Normal filling pressure. Successful PCI with GRAHAM to SVG to OM1.     Had repeat LHC in April 2018 for decline in LVF and noted to have progression of native disease when compared to angiogram in 2016. LHC showed 3 vessels , patent LIMA and diffuse disease of distal LAD. SVG to OM1 stent patent and to RCA . Medical management continued.     In April 2019, he underwent cervical spine surgery at [facility]. Unfortunately had CVA shortly after discharge and readmitted to [facility]. Found to have left carotid 60-79% lesion. Vascular consulted and underwent left CEA with Dr. Ag earlier this month. Scheduled for post op follow up today.     Had problems with symptomatic hypotension after returning home. Imdur decreased to once daily.   BP looks ok today.     Denies any chest pain, SOB, GOODMAN,  orthopnea, PND, dizziness, palpitations,  near syncope, syncope or edema . Has not had to take any SL nitro.  Has no symptoms concerning for angina or equivalent. No CNS Complaints to suggest TIA or recurrent CVA.  Has been started on Repatha.  Does well with limiting sodium intake.      Past Medical History:   Diagnosis Date    Acute coronary syndrome     Chronic systolic congestive heart failure 7/19/2019    CKD stage 3 due to type 2 diabetes mellitus     Coronary artery disease involving native coronary artery without angina pectoris 9/13/2016     Coronary atherosclerosis of unspecified type of vessel, native or graft     s/p cabg and stents    DM (diabetes mellitus), type 2 with complications     History of gout     Hyperlipidemia associated with type 2 diabetes mellitus     Hypertension associated with diabetes     NSTEMI (non-ST elevated myocardial infarction) 5/16/2016    Obesity, unspecified     SOFIYA on CPAP     S/P CABG x 3     Stenosis of left carotid artery 7/19/2019    Type II diabetes mellitus with renal manifestations        Past Surgical History:   Procedure Laterality Date    BACK SURGERY      CARDIAC CATHETERIZATION      CAROTID ENDARTERECTOMY Left 08/2019    CATHETERIZATION, HEART, LEFT Left 4/16/2019    Performed by Vincent [facility]ton [facility] at [facility] CATH LAB    COLONOSCOPY N/A 7/3/2014    Performed by Pipo Woodruff MD at [facility] ENDO    CORONARY ANGIOPLASTY      CORONARY ARTERY BYPASS GRAFT      NECK SURGERY      x2       Social History     Tobacco Use    Smoking status: Never Smoker    Smokeless tobacco: Never Used   Substance Use Topics    Alcohol use: No     Frequency: Never    Drug use: No       Family History   Problem Relation Age of Onset    Diabetes Mother     Hypertension Father     Stroke Sister     Diabetes Sister     Heart disease Brother     Diabetes Brother     Hypertension Brother        Current Outpatient Medications   Medication Sig    ACCU-CHEK MULTICLIX LANCET lancets TEST DAILY AS DIRECTED    aspirin 81 MG Chew Take 81 mg by mouth once daily.    BRILINTA 90 mg tablet TAKE ONE TABLET BY MOUTH TWICE DAILY    carvedilol (COREG) 12.5 MG tablet Take 1 tablet (12.5 mg total) by mouth 2 (two) times daily with meals.    evolocumab (REPATHA SURECLICK) 140 mg/mL PnIj Inject 1 mL (140 mg total) into the skin every 14 (fourteen) days.    FREESTYLE LITE STRIPS Strp CHECK BLOOD SUGAR FOUR TIMES DAILY    furosemide (LASIX) 40 MG tablet Take 1 tablet (40 mg total) by mouth once daily.    isosorbide  "mononitrate (IMDUR) 60 MG 24 hr tablet Take 1 tablet (60 mg total) by mouth once daily.    levothyroxine (SYNTHROID) 75 MCG tablet Take 1 tablet (75 mcg total) by mouth once daily.    nitroGLYCERIN (NITROSTAT) 0.4 MG SL tablet Place 1 tablet (0.4 mg total) under the tongue every 5 (five) minutes as needed for Chest pain.    olmesartan (BENICAR) 5 MG Tab Take 1 tablet (5 mg total) by mouth once daily.    RANEXA 500 mg Tb12 TAKE ONE TABLET BY MOUTH TWICE DAILY    rosuvastatin (CRESTOR) 40 MG Tab Take 40 mg by mouth once daily.    SURE COMFORT PEN NEEDLE 31 gauge x 5/16" Ndle AS DIRECTED    TOUJEO SOLOSTAR U-300 INSULIN 300 unit/mL (1.5 mL) InPn pen INJECT 60 UNITS SUBCUTANEOUSLY ONCE A DAY    TRULICITY 1.5 mg/0.5 mL PnIj INJECT 1.5 MG SUBCUTANEOUSLY ONCE WEEKLY     No current facility-administered medications for this visit.      Current Outpatient Medications on File Prior to Visit   Medication Sig    ACCU-CHEK MULTICLIX LANCET lancets TEST DAILY AS DIRECTED    aspirin 81 MG Chew Take 81 mg by mouth once daily.    BRILINTA 90 mg tablet TAKE ONE TABLET BY MOUTH TWICE DAILY    carvedilol (COREG) 12.5 MG tablet Take 1 tablet (12.5 mg total) by mouth 2 (two) times daily with meals.    evolocumab (REPATHA SURECLICK) 140 mg/mL PnIj Inject 1 mL (140 mg total) into the skin every 14 (fourteen) days.    FREESTYLE LITE STRIPS Strp CHECK BLOOD SUGAR FOUR TIMES DAILY    furosemide (LASIX) 40 MG tablet Take 1 tablet (40 mg total) by mouth once daily.    isosorbide mononitrate (IMDUR) 60 MG 24 hr tablet Take 1 tablet (60 mg total) by mouth once daily.    levothyroxine (SYNTHROID) 75 MCG tablet Take 1 tablet (75 mcg total) by mouth once daily.    nitroGLYCERIN (NITROSTAT) 0.4 MG SL tablet Place 1 tablet (0.4 mg total) under the tongue every 5 (five) minutes as needed for Chest pain.    olmesartan (BENICAR) 5 MG Tab Take 1 tablet (5 mg total) by mouth once daily.    RANEXA 500 mg Tb12 TAKE ONE TABLET BY MOUTH " "TWICE DAILY    rosuvastatin (CRESTOR) 40 MG Tab Take 40 mg by mouth once daily.    SURE COMFORT PEN NEEDLE 31 gauge x 5/16" Ndle AS DIRECTED    TOUJEO SOLOSTAR U-300 INSULIN 300 unit/mL (1.5 mL) InPn pen INJECT 60 UNITS SUBCUTANEOUSLY ONCE A DAY    TRULICITY 1.5 mg/0.5 mL PnIj INJECT 1.5 MG SUBCUTANEOUSLY ONCE WEEKLY     No current facility-administered medications on file prior to visit.        ROS    Objective:   Physical Exam  Vitals:    08/22/19 1142 08/22/19 1143   BP: (!) 144/80 138/78   BP Location: Left arm Left arm   Patient Position: Sitting Sitting   Pulse: 84    Weight: 98.3 kg (216 lb 11.4 oz)    Height: 6' (1.829 m)      Lab Results   Component Value Date    CHOL 137 08/13/2019    CHOL 169 03/08/2019    CHOL 138 09/28/2018     Lab Results   Component Value Date    HDL 31 (L) 08/13/2019    HDL 45 03/08/2019    HDL 26 (L) 09/28/2018     Lab Results   Component Value Date    LDLCALC 85.0 08/13/2019    LDLCALC 110 03/08/2019    LDLCALC 90.2 09/28/2018     Lab Results   Component Value Date    TRIG 105 08/13/2019    TRIG 109 09/28/2018    TRIG 93 10/10/2017     Lab Results   Component Value Date    CHOLHDL 22.6 08/13/2019    CHOLHDL 18.8 (L) 09/28/2018    CHOLHDL 23.2 10/10/2017       Chemistry        Component Value Date/Time     (L) 08/13/2019 1200    K 3.7 08/13/2019 1200     08/13/2019 1200    CO2 24 08/13/2019 1200    BUN 25 (H) 08/13/2019 1200    CREATININE 1.9 (H) 08/13/2019 1200     08/13/2019 1200        Component Value Date/Time    CALCIUM 10.4 08/13/2019 1200    ALKPHOS 58 08/13/2019 1200    AST 29 08/13/2019 1200    ALT 26 08/13/2019 1200    BILITOT 0.5 08/13/2019 1200    ESTGFRAFRICA 43.3 (A) 08/13/2019 1200    EGFRNONAA 37.5 (A) 08/13/2019 1200          Lab Results   Component Value Date    TSH 2.566 08/13/2019     Lab Results   Component Value Date    INR 0.9 04/16/2019    INR 1.1 09/28/2018    INR 1.0 05/15/2016     Lab Results   Component Value Date    WBC 5.32 " 08/13/2019    HGB 12.8 (L) 08/13/2019    HCT 37.8 (L) 08/13/2019    MCV 87 08/13/2019     08/13/2019     BMP  Sodium   Date Value Ref Range Status   08/13/2019 135 (L) 136 - 145 mmol/L Final     Potassium   Date Value Ref Range Status   08/13/2019 3.7 3.5 - 5.1 mmol/L Final     Chloride   Date Value Ref Range Status   08/13/2019 101 95 - 110 mmol/L Final     CO2   Date Value Ref Range Status   08/13/2019 24 23 - 29 mmol/L Final     BUN, Bld   Date Value Ref Range Status   08/13/2019 25 (H) 6 - 20 mg/dL Final     Creatinine   Date Value Ref Range Status   08/13/2019 1.9 (H) 0.5 - 1.4 mg/dL Final     Calcium   Date Value Ref Range Status   08/13/2019 10.4 8.7 - 10.5 mg/dL Final     Anion Gap   Date Value Ref Range Status   08/13/2019 10 8 - 16 mmol/L Final     eGFR if    Date Value Ref Range Status   08/13/2019 43.3 (A) >60 mL/min/1.73 m^2 Final     eGFR if non    Date Value Ref Range Status   08/13/2019 37.5 (A) >60 mL/min/1.73 m^2 Final     Comment:     Calculation used to obtain the estimated glomerular filtration  rate (eGFR) is the CKD-EPI equation.        CrCl cannot be calculated (Patient's most recent lab result is older than the maximum 7 days allowed.).    Assessment:     1. Chronic systolic congestive heart failure    2. Coronary artery disease involving native coronary artery of native heart without angina pectoris    3. Hypertension associated with diabetes    4. Stenosis of left carotid artery    5. Type 2 diabetes mellitus with complication, with long-term current use of insulin    6. SOFIYA on CPAP        Plan:   Chronic systolic congestive heart failure  Continue current medical management   Heart healthy diet  Limit fluid intake 50-60 oz   Daily weights and to notify clinic if weight increases by more than 3 lbs in 1 day or 5 lbs in 1 week.   Exercise routine as tolerated    Coronary artery disease involving native coronary artery of native heart without angina  pectoris  Continue ASA, Brilinta, Statin, BB, Ranexa and Imdur   Heart healthy diet     Hypertension associated with diabetes  Continue current medical management     Stenosis of left carotid artery  Follow up as scheduled with Dr. Ag    Type 2 diabetes mellitus with complication, with long-term current use of insulin  Follow PCP recommendations for Diabetes management     SOFIYA on CPAP  Continue CPAP use     RTC in 3 months or sooner if needed

## 2019-08-26 RX ORDER — LANCETS
EACH MISCELLANEOUS
Qty: 102 EACH | Refills: 11 | Status: SHIPPED | OUTPATIENT
Start: 2019-08-26

## 2019-09-05 ENCOUNTER — OFFICE VISIT (OUTPATIENT)
Dept: NEPHROLOGY | Facility: CLINIC | Age: 59
End: 2019-09-05
Payer: COMMERCIAL

## 2019-09-05 VITALS
HEIGHT: 72 IN | DIASTOLIC BLOOD PRESSURE: 70 MMHG | WEIGHT: 215.63 LBS | HEART RATE: 56 BPM | BODY MASS INDEX: 29.21 KG/M2 | SYSTOLIC BLOOD PRESSURE: 114 MMHG

## 2019-09-05 DIAGNOSIS — I50.40 COMBINED SYSTOLIC AND DIASTOLIC CONGESTIVE HEART FAILURE, UNSPECIFIED HF CHRONICITY: ICD-10-CM

## 2019-09-05 DIAGNOSIS — E11.21 DIABETIC NEPHROPATHY ASSOCIATED WITH TYPE 2 DIABETES MELLITUS: ICD-10-CM

## 2019-09-05 DIAGNOSIS — Z71.89 ENCOUNTER FOR MEDICATION REVIEW AND COUNSELING: ICD-10-CM

## 2019-09-05 DIAGNOSIS — N18.30 STAGE 3 CHRONIC KIDNEY DISEASE: Primary | ICD-10-CM

## 2019-09-05 DIAGNOSIS — I10 ESSENTIAL HYPERTENSION: ICD-10-CM

## 2019-09-05 PROCEDURE — 3074F SYST BP LT 130 MM HG: CPT | Mod: CPTII,S$GLB,, | Performed by: INTERNAL MEDICINE

## 2019-09-05 PROCEDURE — 3008F BODY MASS INDEX DOCD: CPT | Mod: CPTII,S$GLB,, | Performed by: INTERNAL MEDICINE

## 2019-09-05 PROCEDURE — 3078F PR MOST RECENT DIASTOLIC BLOOD PRESSURE < 80 MM HG: ICD-10-PCS | Mod: CPTII,S$GLB,, | Performed by: INTERNAL MEDICINE

## 2019-09-05 PROCEDURE — 99214 OFFICE O/P EST MOD 30 MIN: CPT | Mod: S$GLB,,, | Performed by: INTERNAL MEDICINE

## 2019-09-05 PROCEDURE — 99214 PR OFFICE/OUTPT VISIT, EST, LEVL IV, 30-39 MIN: ICD-10-PCS | Mod: S$GLB,,, | Performed by: INTERNAL MEDICINE

## 2019-09-05 PROCEDURE — 3078F DIAST BP <80 MM HG: CPT | Mod: CPTII,S$GLB,, | Performed by: INTERNAL MEDICINE

## 2019-09-05 PROCEDURE — 99999 PR PBB SHADOW E&M-EST. PATIENT-LVL III: CPT | Mod: PBBFAC,,, | Performed by: INTERNAL MEDICINE

## 2019-09-05 PROCEDURE — 3044F HG A1C LEVEL LT 7.0%: CPT | Mod: CPTII,S$GLB,, | Performed by: INTERNAL MEDICINE

## 2019-09-05 PROCEDURE — 3044F PR MOST RECENT HEMOGLOBIN A1C LEVEL <7.0%: ICD-10-PCS | Mod: CPTII,S$GLB,, | Performed by: INTERNAL MEDICINE

## 2019-09-05 PROCEDURE — 3074F PR MOST RECENT SYSTOLIC BLOOD PRESSURE < 130 MM HG: ICD-10-PCS | Mod: CPTII,S$GLB,, | Performed by: INTERNAL MEDICINE

## 2019-09-05 PROCEDURE — 99999 PR PBB SHADOW E&M-EST. PATIENT-LVL III: ICD-10-PCS | Mod: PBBFAC,,, | Performed by: INTERNAL MEDICINE

## 2019-09-05 PROCEDURE — 3008F PR BODY MASS INDEX (BMI) DOCUMENTED: ICD-10-PCS | Mod: CPTII,S$GLB,, | Performed by: INTERNAL MEDICINE

## 2019-09-05 RX ORDER — FUROSEMIDE 20 MG/1
20 TABLET ORAL DAILY
Refills: 12 | COMMUNITY
Start: 2019-08-21

## 2019-09-05 NOTE — PROGRESS NOTES
"NEPHROLOGY CLINIC FOLLOWUP NOTE:  Date of clinic visit: 9/5/19     REASON FOR FOLLOWUP AND CHIEF COMPLAINT:  History of chronic kidney disease.       ORTHOPEDIC SURGEON:  Mendoza Wray MD     HISTORY OF PRESENT ILLNESS:  Mr. Cox is a 59-year-old AA male with the history of CKD stage 3, diabetes mellitus, hypertension and combined diastolic and systolic CHF (EF 30%), who presents for follow-up. He was last seen by us in March 2019. Chart was reviewed. Pt has purposefully lose wt about 20 lbs, he is eating much more healthy, walks to exercise. He says he is feeling "fine" today. No acute issues today, no chest pain.      PAST MEDICAL HISTORY:  1.  CKD stage III, estimated GFR about 40 mL/min, baseline creatinine is close to 2 mg/dL.  2.  Hypertension.  3.  Diabetes mellitus for at least 10 years.  4.  History of coronary artery disease with past history of MI at the age of 19/premature heart disease and history of CABG in 2001.  5.  Diastolic and systolic (EF 30%) congestive heart failure.  6.  Obstructive sleep apnea, on CPAP.  7.  Gout.  8.  Restless leg syndrome.  9.  Hyperlipidemia.  10.  Bursitis of the right elbow.     PAST SURGICAL HISTORY:  Reviewed as above unchanged.     FAMILY HISTORY:  Reviewed and unchanged.  Mother passed away at the age of 52   from pancreatic cancer.  Multiple family members with diabetes mellitus.  Father   is alive and well in his 70s.     ALLERGIES:  Reviewed.  No known drug allergies.     SOCIAL HISTORY:  Negative for smoking.  No alcohol use.     MEDICATIONS:  Reviewed.     Current Outpatient Medications:     aspirin 81 MG Chew, Take 81 mg by mouth once daily., Disp: , Rfl:     BLOOD PRESSURE CUFF Misc, Check BP twice weekly, Disp: 1 each, Rfl: 0    BRILINTA 90 mg tablet, TAKE ONE TABLET BY MOUTH TWICE DAILY, Disp: 180 tablet, Rfl: 3    carvedilol (COREG) 12.5 MG tablet, Take 1 tablet (12.5 mg total) by mouth 2 (two) times daily with meals., Disp: 60 tablet, Rfl: 5    " "evolocumab (REPATHA SURECLICK) 140 mg/mL PnIj, Inject 1 mL (140 mg total) into the skin every 14 (fourteen) days., Disp: 2 mL, Rfl: 0    FREESTYLE LITE STRIPS Strp, CHECK BLOOD SUGAR FOUR TIMES DAILY, Disp: , Rfl: 3    furosemide (LASIX) 20 MG tablet, Take 20 mg by mouth once daily., Disp: , Rfl: 12    furosemide (LASIX) 40 MG tablet, Take 1 tablet (40 mg total) by mouth once daily., Disp: 30 tablet, Rfl: 11    isosorbide mononitrate (IMDUR) 60 MG 24 hr tablet, Take 1 tablet (60 mg total) by mouth once daily., Disp: 60 tablet, Rfl: 5    lancets (ACCU-CHEK MULTICLIX LANCET) Misc, TEST DAILY AS DIRECTED, Disp: 102 each, Rfl: 11    levothyroxine (SYNTHROID) 75 MCG tablet, Take 1 tablet (75 mcg total) by mouth once daily., Disp: 90 tablet, Rfl: 3    nitroGLYCERIN (NITROSTAT) 0.4 MG SL tablet, Place 1 tablet (0.4 mg total) under the tongue every 5 (five) minutes as needed for Chest pain., Disp: 25 tablet, Rfl: 11    olmesartan (BENICAR) 5 MG Tab, Take 1 tablet (5 mg total) by mouth once daily., Disp: 90 tablet, Rfl: 3    RANEXA 500 mg Tb12, TAKE ONE TABLET BY MOUTH TWICE DAILY, Disp: 180 tablet, Rfl: 3    rosuvastatin (CRESTOR) 40 MG Tab, Take 40 mg by mouth once daily., Disp: , Rfl: 1    SURE COMFORT PEN NEEDLE 31 gauge x 5/16" Ndle, AS DIRECTED, Disp: 100 each, Rfl: 3    TOUJEO SOLOSTAR U-300 INSULIN 300 unit/mL (1.5 mL) InPn pen, INJECT 60 UNITS SUBCUTANEOUSLY ONCE A DAY, Disp: , Rfl: 5    TRULICITY 1.5 mg/0.5 mL PnIj, INJECT 1.5 MG SUBCUTANEOUSLY ONCE WEEKLY, Disp: , Rfl: 5       REVIEW OF SYSTEMS:  No recent hospitalizations.  GENERAL:  Negative.  HEAD, EYES, EARS, NOSE AND THROAT:  Negative.  CARDIAC:  Negative.  PULMONARY:  Negative.  GASTROINTESTINAL:  Negative.  GENITOURINARY:  Negative.  PSYCHOLOGICAL:  Negative.  NEUROLOGICAL:  Negative.  ENDOCRINE:  As above, otherwise negative.  HEMATOLOGIC AND ONCOLOGIC:  Negative.  INFECTIOUS DISEASE:  Negative.  The rest of the review of systems " negative.     PHYSICAL EXAMINATION:  VITAL SIGNS:  Blood pressure is 114/70, pulse is 60, weight is 215 lbs, last visit 240 lbs  In NAD, ambulating by himself  GENERAL:  He is cooperative, pleasant, atraumatic, normocephalic, well developed, well nourished.    Speech and thought process appropriate, normal.  HEENT:  Mucous membranes moist.  NECK:  No JVD.  HEART:  Regular rate and rhythm, S1 and S2 audible.  No rubs.  CHEST:  Clear to auscultation.  No rales.  No wheezes.  Breathing symmetric and unlabored.  EXTREMITIES:  no or trace edema.     LABORATORY:  Reviewed.   BMP  Lab Results   Component Value Date     (L) 08/13/2019    K 3.7 08/13/2019     08/13/2019    CO2 24 08/13/2019    BUN 25 (H) 08/13/2019    CREATININE 1.9 (H) 08/13/2019    CALCIUM 10.4 08/13/2019    ANIONGAP 10 08/13/2019    ESTGFRAFRICA 43.3 (A) 08/13/2019    EGFRNONAA 37.5 (A) 08/13/2019     Lab Results   Component Value Date    WBC 5.32 08/13/2019    HGB 12.8 (L) 08/13/2019    HCT 37.8 (L) 08/13/2019    MCV 87 08/13/2019     08/13/2019      Lab Results   Component Value Date    .0 (H) 03/11/2019    CALCIUM 10.4 08/13/2019    PHOS 3.0 09/28/2018          Urine protein//Cr 110 mg, from 170 mg  PSA was 0.6.     ASSESSMENT AND PLAN:  This is a 59-year-old man with CKD stage III, who presents for followup.  The impression is as follows:     1.  Renal.  s Cr unchanged. Renal function is stable.  Creatinine has not changed or worsened since last visit  CKD stage 3  Likely cause HTN/hypertensive nephrosclerosis, vascular disease, and  and diabetic nephropathy  K normal  Acid base stable     2. iPTH slightly elevated, has mild secondary hyperparathyroidism   Will monitor.  Vit D not indicated at this point.     3. Microalbuminuria. Likely due to diabetic nephropathy, not worse, improved on ARB's. Will continue    4. HTN: BP well controlled  Meds reviewed      5.  Electrolytes were reviewed.    Potassium is normal  Acid base  that is stable.    Mild hypercalcemia is the past (was due to HCTZ), s Ca currently normal     5.  Cardiac. H/o of diastolic CHF  Hemodynamically stable, and well compensated  Life style, XS salt and fluid intake reviewed with pt  Limit salt intake to 2-3 g/day  Limit fluid intake to 1-2 L/day     6.  H/o of diabetes mellitus   Obesity  Metabolic syndrome  Hemoglobin A1c reviewed     7. Diet and lifestyle modifications: much improved  Pt is committed to his health    PLANS AND RECOMMENDATIONS:    As discussed above.  Total time spent 25 minutes, more than 50% of the time was spent on counseling   coordination of care.  Level 4 visit   RTC 6 months or sooner pelon Strauss MD

## 2019-09-10 RX ORDER — NITROGLYCERIN 0.4 MG/1
0.4 TABLET SUBLINGUAL EVERY 5 MIN PRN
Qty: 25 TABLET | Refills: 11 | Status: SHIPPED | OUTPATIENT
Start: 2019-09-10 | End: 2019-09-24 | Stop reason: SDUPTHER

## 2019-09-10 NOTE — TELEPHONE ENCOUNTER
----- Message from АНДРЕЙ Barbosa sent at 9/10/2019  4:40 PM CDT -----  Patient seen in August and advised to follow up in 3 months. However, he is on my schedule for tomorrow. This may be an old appt that wasn't canceled. If he is not having any issues, he can reschedule as advised.     Thanks

## 2019-09-13 ENCOUNTER — TELEPHONE (OUTPATIENT)
Dept: CARDIOLOGY | Facility: CLINIC | Age: 59
End: 2019-09-13

## 2019-09-13 NOTE — TELEPHONE ENCOUNTER
Spoke with pt who stated he was denied Repatha.  Pharmacy called and asked that we resend the Rx to [facility] and they will work on it. Will let Dr Vallejo know.      ----- Message from Brooke Morrissey sent at 9/13/2019  9:52 AM CDT -----  Contact: [phone]  Would like to consult with the nurse, Patient  Rx Medication was denied, Patient would like to know why it was cancel, Patient would like to speak with the nurse concerning this, Please call back at [phone], Thanks sj

## 2019-09-13 NOTE — TELEPHONE ENCOUNTER
Patient called inquiring about his Repatha shipment. Advised him that refill request was sent to [facility] and it was DENIED on 9/10/19. He says that he spoke with Dr. Vallejo's nurse and that should not be the case. I called and spoke with  RENETTA Miller who confirms that is true. Refill request resent electronically and she will have Dr. Vallejo sign off on it. [facility] to contact pt ASAP to set up shipment when RX received. TTN

## 2019-09-16 ENCOUNTER — TELEPHONE (OUTPATIENT)
Dept: PHARMACY | Facility: CLINIC | Age: 59
End: 2019-09-16

## 2019-09-16 NOTE — TELEPHONE ENCOUNTER
New Rx received for Repatha 140mg/ml PENS - appropriate. Called to inform patient. Confirmed two patient identifiers - name + . Patient missed his dose on 19 due to lack of refills. [facility] to ship out Repatha on 19 to arrive at patient's home on 19 via [employer]. Patient will take dose when received on 19 and resume usual every other Friday dosing with next dose on 19. Address confirmed, $5 copay ([facility]), no additional supplies needed at this time. Patient denies any side effects, difficulities with administration, or changes to allergies, health conditions, medications or insurance. All questions answered to patient's satisfaction, [facility] will continue to reach out to patient monthly for refills. TTN

## 2019-09-17 ENCOUNTER — TELEPHONE (OUTPATIENT)
Dept: CARDIOLOGY | Facility: CLINIC | Age: 59
End: 2019-09-17

## 2019-09-17 NOTE — TELEPHONE ENCOUNTER
----- Message from Marly Duckworth PharmD sent at 9/17/2019  8:58 AM CDT -----  Regarding: Repatha Shipment  Hi Dr. Vallejo + Staff,    Patient is scheduled to have a shipment go out today, but the RX has been discontinued in Epic so it automatically discontinues it in our processing system. Was this intended? If appropriate, would you mind reactivating the RX or sending a new RX for the Repatha so that we can proceed with a shipment for today please??    Thanks,  Marly Duckworth, [place], [facility]  Clinical Pharmacist  [facility]  P: [phone]   Toll Free: [phone]  Monday thru Friday 7am-7pm  F: [phone]

## 2019-09-19 ENCOUNTER — PATIENT OUTREACH (OUTPATIENT)
Dept: ADMINISTRATIVE | Facility: HOSPITAL | Age: 59
End: 2019-09-19

## 2019-09-24 RX ORDER — NITROGLYCERIN 0.4 MG/1
0.4 TABLET SUBLINGUAL EVERY 5 MIN PRN
Qty: 25 TABLET | Refills: 11 | Status: SHIPPED | OUTPATIENT
Start: 2019-09-24

## 2019-10-08 ENCOUNTER — TELEPHONE (OUTPATIENT)
Dept: PHARMACY | Facility: CLINIC | Age: 59
End: 2019-10-08

## 2019-10-08 NOTE — TELEPHONE ENCOUNTER
Refill call regarding Repatha at [facility]. Will prepare for shipment on 10/10 to arrive 10/11 with pt consent. Copay 5.00 CCOF 3667. Patient has not started any new medications, no missed doses/ side effects. Patient did not have any concerns or questions for the pharmacist at this time.  & address verified.  ~10/15 next injection with 0 doses on hand and no sharps needed with this fill

## 2019-11-05 ENCOUNTER — TELEPHONE (OUTPATIENT)
Dept: PHARMACY | Facility: CLINIC | Age: 59
End: 2019-11-05

## 2019-11-06 ENCOUNTER — PATIENT OUTREACH (OUTPATIENT)
Dept: ADMINISTRATIVE | Facility: HOSPITAL | Age: 59
End: 2019-11-06

## 2019-11-08 ENCOUNTER — TELEPHONE (OUTPATIENT)
Dept: INTERNAL MEDICINE | Facility: CLINIC | Age: 59
End: 2019-11-08

## 2019-11-08 NOTE — TELEPHONE ENCOUNTER
----- Message from Dahlia Gandara sent at 11/8/2019 12:34 PM CST -----  Contact: wife  The pt request a return call, no additional info given and can be reached at [phone]///[email or web address]

## 2023-04-18 NOTE — TELEPHONE ENCOUNTER
Stop irbesartan/hydrochlorothiazide  Check blood pressure daily and massage the readings in 2 weeks or so .       Patient called stating he is not able to get his Imdur because it was recently filled reflecting daily ,states he needs a verbal authorization by a  stating he is taking Imdur 30 mg BID.
